# Patient Record
Sex: FEMALE | Race: BLACK OR AFRICAN AMERICAN | NOT HISPANIC OR LATINO | Employment: STUDENT | URBAN - METROPOLITAN AREA
[De-identification: names, ages, dates, MRNs, and addresses within clinical notes are randomized per-mention and may not be internally consistent; named-entity substitution may affect disease eponyms.]

---
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
## 2021-01-18 ENCOUNTER — OFFICE VISIT (OUTPATIENT)
Dept: FAMILY MEDICINE CLINIC | Facility: CLINIC | Age: 21
End: 2021-01-18

## 2021-01-18 VITALS
DIASTOLIC BLOOD PRESSURE: 74 MMHG | RESPIRATION RATE: 18 BRPM | HEART RATE: 89 BPM | SYSTOLIC BLOOD PRESSURE: 122 MMHG | TEMPERATURE: 97.8 F | HEIGHT: 67 IN | BODY MASS INDEX: 45.04 KG/M2 | WEIGHT: 287 LBS | OXYGEN SATURATION: 99 %

## 2021-01-18 DIAGNOSIS — E66.9 OBESITY (BMI 35.0-39.9 WITHOUT COMORBIDITY): ICD-10-CM

## 2021-01-18 DIAGNOSIS — Z00.00 ANNUAL PHYSICAL EXAM: Primary | ICD-10-CM

## 2021-01-18 DIAGNOSIS — Z78.9 NEED FOR FOLLOW-UP BY SOCIAL WORKER: ICD-10-CM

## 2021-01-18 DIAGNOSIS — R53.83 OTHER FATIGUE: ICD-10-CM

## 2021-01-18 PROCEDURE — 99395 PREV VISIT EST AGE 18-39: CPT | Performed by: FAMILY MEDICINE

## 2021-01-18 NOTE — ASSESSMENT & PLAN NOTE
Counseled on safe sex practices, currently abstinent  UTD with immunization  Declined flu shot  Lipid panel, A1c pending

## 2021-01-18 NOTE — PROGRESS NOTES
Assessment/Plan:    Annual physical exam  Counseled on safe sex practices, currently abstinent  UTD with immunization  Declined flu shot  Lipid panel, A1c pending    Obesity (BMI 35 0-39 9 without comorbidity)  Counseled patient on the importance of working to achieve weight reduction goal   Discussed benefits of weight loss including prevention or control of comorbidities   Discussed role that balanced diet and daily activity play in weight reduction   Set up small attainable weight loss goals   Involve family, friends, and co-workers for support  Other fatigue  PHQ2-0  TSH CBC pending  No sleep apnea symptoms       Diagnoses and all orders for this visit:    Annual physical exam    Obesity (BMI 35 0-39 9 without comorbidity)    Other fatigue    Need for follow-up by   -     Ambulatory referral to social work care management program; Future          Subjective:      Patient ID: Maxwell Epley is a 24 y o  female  HPI  This is a very pleasant 24 y o  female who presents to the clinic for management of their chronic medical conditions  Patient's medical conditions are stable unless noted otherwise above  Patient has not had any recent hospitalizations, or medical emergencies since last visit  Patient has no further complaints other than what is mentioned in the ROS  Denies any depressive symptoms, has withdrawn from school for semester secondary to pending unfortunately has not found job to date  Report expected waking likely due to sedentary lifestyle, reports increased fatigue sleeping approximately 10-12 hours per day and taking naps throughout  Denies any suicidal homicidal ideations  The following portions of the patient's history were reviewed and updated as appropriate: allergies, current medications, past family history, past medical history, past social history, past surgical history and problem list     Review of Systems   Constitutional: Positive for fatigue   Negative for activity change, appetite change, fever and unexpected weight change  HENT: Negative for sneezing, sore throat and tinnitus  Eyes: Negative for pain  Respiratory: Negative for cough, choking and chest tightness  Cardiovascular: Negative for chest pain, palpitations and leg swelling  Gastrointestinal: Negative for abdominal distention, abdominal pain, constipation, diarrhea, nausea and vomiting  Endocrine: Negative for polydipsia, polyphagia and polyuria  Genitourinary: Negative for dysuria and flank pain  Musculoskeletal: Negative for back pain  Neurological: Negative for dizziness, seizures, syncope, speech difficulty, light-headedness and numbness  Psychiatric/Behavioral: Negative for agitation  Objective:      /74 (BP Location: Left arm, Patient Position: Sitting, Cuff Size: Large)   Pulse 89   Temp 97 8 °F (36 6 °C) (Temporal)   Resp 18   Ht 5' 7" (1 702 m)   Wt 130 kg (287 lb)   LMP 01/18/2021 (Exact Date)   SpO2 99%   BMI 44 95 kg/m²          Physical Exam  Constitutional:       General: She is not in acute distress  Appearance: She is well-developed  She is obese  She is not diaphoretic  HENT:      Head: Normocephalic and atraumatic  Right Ear: External ear normal  There is no impacted cerumen  Left Ear: External ear normal  There is no impacted cerumen  Ears:      Comments: R ear effusion  BC>AC right     Mouth/Throat:      Mouth: Mucous membranes are moist       Pharynx: No oropharyngeal exudate  Eyes:      General:         Right eye: No discharge  Left eye: No discharge  Pupils: Pupils are equal, round, and reactive to light  Neck:      Musculoskeletal: Neck supple  Thyroid: No thyromegaly  Trachea: No tracheal deviation  Cardiovascular:      Rate and Rhythm: Normal rate and regular rhythm  Heart sounds: Normal heart sounds  No murmur     Pulmonary:      Effort: Pulmonary effort is normal  No respiratory distress  Breath sounds: Normal breath sounds  No wheezing  Abdominal:      General: Bowel sounds are normal  There is no distension  Palpations: Abdomen is soft  Tenderness: There is no abdominal tenderness  There is no guarding  Musculoskeletal: Normal range of motion  Skin:     General: Skin is warm  Findings: No rash  Neurological:      Mental Status: She is alert and oriented to person, place, and time

## 2021-02-12 ENCOUNTER — PATIENT OUTREACH (OUTPATIENT)
Dept: FAMILY MEDICINE CLINIC | Facility: CLINIC | Age: 21
End: 2021-02-12

## 2021-02-12 NOTE — LETTER
[facility] [facility]  [address]  [phone]    Re:    2/26/2021       Dear Carlyn Scales,    We tried to reach you by phone on three occassions to discuss Mental Health Referral options in the area and was unfortunately unable to reach you  It is important that you contact the [place] [address] as soon as possible at: [phone]       Sincerely,         Shell Maldonado MSW, LSW

## 2021-02-26 NOTE — PROGRESS NOTES
JOSE ANGEL FORBES attemptted to contact Pt on three occassions to discuss [address] referrals in the community however there was no response  JOSE ANGEL FORBES left vms and sent letter to Pt address for further follow up  JOSE ANGEL FORBES will remain available for further assistance as needed

## 2021-12-06 ENCOUNTER — OFFICE VISIT (OUTPATIENT)
Dept: FAMILY MEDICINE CLINIC | Facility: CLINIC | Age: 21
End: 2021-12-06

## 2021-12-06 DIAGNOSIS — J30.1 SEASONAL ALLERGIC RHINITIS DUE TO POLLEN: ICD-10-CM

## 2021-12-06 DIAGNOSIS — J02.9 PHARYNGITIS, UNSPECIFIED ETIOLOGY: Primary | ICD-10-CM

## 2021-12-06 PROCEDURE — 99214 OFFICE O/P EST MOD 30 MIN: CPT | Performed by: NURSE PRACTITIONER

## 2021-12-06 RX ORDER — LORATADINE 10 MG/1
10 TABLET ORAL DAILY
Qty: 30 TABLET | Refills: 3 | Status: SHIPPED | OUTPATIENT
Start: 2021-12-06

## 2021-12-06 RX ORDER — SENNOSIDES 8.6 MG
650 CAPSULE ORAL EVERY 8 HOURS PRN
Qty: 30 TABLET | Refills: 0 | Status: SHIPPED | OUTPATIENT
Start: 2021-12-06

## 2021-12-06 RX ORDER — FLUTICASONE PROPIONATE 50 MCG
1 SPRAY, SUSPENSION (ML) NASAL DAILY
Qty: 18 G | Refills: 2 | Status: SHIPPED | OUTPATIENT
Start: 2021-12-06

## 2021-12-07 PROCEDURE — U0005 INFEC AGEN DETEC AMPLI PROBE: HCPCS | Performed by: NURSE PRACTITIONER

## 2021-12-07 PROCEDURE — U0003 INFECTIOUS AGENT DETECTION BY NUCLEIC ACID (DNA OR RNA); SEVERE ACUTE RESPIRATORY SYNDROME CORONAVIRUS 2 (SARS-COV-2) (CORONAVIRUS DISEASE [COVID-19]), AMPLIFIED PROBE TECHNIQUE, MAKING USE OF HIGH THROUGHPUT TECHNOLOGIES AS DESCRIBED BY CMS-2020-01-R: HCPCS | Performed by: NURSE PRACTITIONER

## 2021-12-08 LAB — SARS-COV-2 RNA RESP QL NAA+PROBE: NEGATIVE

## 2021-12-09 ENCOUNTER — OFFICE VISIT (OUTPATIENT)
Dept: URGENT CARE | Age: 21
End: 2021-12-09
Payer: COMMERCIAL

## 2021-12-09 VITALS
HEART RATE: 90 BPM | OXYGEN SATURATION: 100 % | TEMPERATURE: 99 F | BODY MASS INDEX: 45.04 KG/M2 | RESPIRATION RATE: 18 BRPM | HEIGHT: 67 IN | WEIGHT: 287 LBS

## 2021-12-09 DIAGNOSIS — J06.9 VIRAL URI WITH COUGH: Primary | ICD-10-CM

## 2021-12-09 DIAGNOSIS — Z11.59 SPECIAL SCREENING EXAMINATION FOR VIRAL DISEASE: ICD-10-CM

## 2021-12-09 LAB
FLUAV RNA RESP QL NAA+PROBE: NEGATIVE
FLUBV RNA RESP QL NAA+PROBE: NEGATIVE
RSV RNA RESP QL NAA+PROBE: NEGATIVE
SARS-COV-2 RNA RESP QL NAA+PROBE: NEGATIVE

## 2021-12-09 PROCEDURE — 0241U HB NFCT DS VIR RESP RNA 4 TRGT: CPT | Performed by: FAMILY MEDICINE

## 2021-12-09 PROCEDURE — G0382 LEV 3 HOSP TYPE B ED VISIT: HCPCS | Performed by: FAMILY MEDICINE

## 2021-12-09 RX ORDER — BROMPHENIRAMINE MALEATE, PSEUDOEPHEDRINE HYDROCHLORIDE, AND DEXTROMETHORPHAN HYDROBROMIDE 2; 30; 10 MG/5ML; MG/5ML; MG/5ML
10 SYRUP ORAL 3 TIMES DAILY PRN
Qty: 200 ML | Refills: 0 | Status: SHIPPED | OUTPATIENT
Start: 2021-12-09

## 2022-11-17 ENCOUNTER — OFFICE VISIT (OUTPATIENT)
Dept: FAMILY MEDICINE CLINIC | Facility: CLINIC | Age: 22
End: 2022-11-17

## 2022-11-17 VITALS
WEIGHT: 232 LBS | BODY MASS INDEX: 36.41 KG/M2 | SYSTOLIC BLOOD PRESSURE: 118 MMHG | RESPIRATION RATE: 16 BRPM | HEART RATE: 105 BPM | TEMPERATURE: 98.7 F | DIASTOLIC BLOOD PRESSURE: 80 MMHG | HEIGHT: 67 IN | OXYGEN SATURATION: 99 %

## 2022-11-17 DIAGNOSIS — J02.9 ACUTE PHARYNGITIS, UNSPECIFIED ETIOLOGY: Primary | ICD-10-CM

## 2022-11-17 LAB — S PYO AG THROAT QL: NEGATIVE

## 2022-11-17 RX ORDER — AMOXICILLIN 500 MG/1
500 TABLET, FILM COATED ORAL 2 TIMES DAILY
Qty: 20 TABLET | Refills: 0 | Status: SHIPPED | OUTPATIENT
Start: 2022-11-17 | End: 2022-11-27

## 2022-11-17 RX ORDER — DEXAMETHASONE 2 MG/1
2 TABLET ORAL ONCE
Qty: 1 TABLET | Refills: 0 | Status: SHIPPED | OUTPATIENT
Start: 2022-11-17 | End: 2022-11-17

## 2022-11-17 NOTE — LETTER
November 17, 2022     Patient: Kacy Lindquist  YOB: 2000  Date of Visit: 11/17/2022      To Whom it May Concern:    Fransisca Julio César is under my professional care  Fransisca was seen in my office on 11/17/2022  Fransisca may return to work on 11/21/22  If you have any questions or concerns, please don't hesitate to call           Sincerely,          Dotty Reagan PA-C        CC: No Recipients

## 2022-11-17 NOTE — PROGRESS NOTES
Assessment/Plan:    Acute pharyngitis  - POCT rapid strep in office is negative  Will send for throat culture  - Due to severity of symptoms and presence of exudates, will treat with amoxicillin 500 mg, twice daily for 10 days  Advised to take entire course of antibiotics even if feeling better beforehand     - Will prescribe Decadron 2 mg, 1 does for throat swelling   - Will prescribe throat lozenges, to be taken every 2 hours as needed for sore throat  - Advised to perform salt water gargles  - Advised to contact the office or report to ED if symptoms persist, worsen, or new symptoms arise  Diagnoses and all orders for this visit:    Acute pharyngitis, unspecified etiology  -     amoxicillin (AMOXIL) 500 MG tablet; Take 1 tablet (500 mg total) by mouth 2 (two) times a day for 10 days  -     Benzocaine-Menthol 6-10 MG lozenge; Take 1 lozenge by mouth every 2 (two) hours as needed for sore throat  -     dexamethasone (DECADRON) 2 mg tablet; Take 1 tablet (2 mg total) by mouth 1 (one) time for 1 dose  -     Throat culture  -     POCT rapid strepA        All of patients questions were answered  Patient understands and agrees with the above plan  Return if symptoms worsen or fail to improve  Kiya Arora PA-C  11/17/22  [address] [place]          Subjective:     Patient ID: Afshin Weinberg  is a 25 y o  female with known PMH of   Seasonal allergies who presents today in office for  Same-day visit for sore throat  - Patient is a 25 y o  female who presents today for  Same-day visit for sore throat  Associated symptoms include nasal blockage, sinus and nasal congestion, sore throat, white spots in throat and Sinus pressure, headaches, slight difficulty with swallowing  Onset of symptoms was 3 days ago, and have been gradually worsening since that time  She is drinking moderate amounts of fluids  She has not had recent close exposure to someone with proven streptococcal pharyngitis   Patient has been drinking tea  And taking Tylenol and Mucinex with little relief  The following portions of the patient's history were reviewed and updated as appropriate: allergies, current medications, past family history, past medical history, past social history, past surgical history and problem list         Review of Systems   Constitutional: Negative for chills and fever  HENT: Positive for congestion, rhinorrhea, sore throat and trouble swallowing  Negative for ear pain  Eyes: Negative for pain  Respiratory: Positive for cough  Negative for chest tightness and shortness of breath  Gastrointestinal: Negative for abdominal pain, constipation, diarrhea, nausea and vomiting  Genitourinary: Negative for difficulty urinating and dysuria  Musculoskeletal: Negative for arthralgias  Skin: Negative for rash  Neurological: Positive for headaches  Negative for dizziness  Objective:   Vitals:    11/17/22 1323   BP: 118/80   BP Location: Right arm   Patient Position: Sitting   Cuff Size: Large   Pulse: 105   Resp: 16   Temp: 98 7 °F (37 1 °C)   TempSrc: Temporal   SpO2: 99%   Weight: 105 kg (232 lb)   Height: 5' 7" (1 702 m)         Physical Exam  Vitals and nursing note reviewed  Constitutional:       General: She is not in acute distress  Appearance: She is well-developed  HENT:      Head: Normocephalic and atraumatic  Right Ear: Tympanic membrane and external ear normal       Left Ear: Tympanic membrane and external ear normal       Nose: Congestion present  Mouth/Throat:      Mouth: Mucous membranes are moist       Pharynx: Pharyngeal swelling, oropharyngeal exudate and posterior oropharyngeal erythema present  Tonsils: Tonsillar exudate present  Eyes:      Conjunctiva/sclera: Conjunctivae normal    Cardiovascular:      Rate and Rhythm: Normal rate and regular rhythm  Pulses: Normal pulses  Heart sounds: Normal heart sounds     Pulmonary:      Effort: Pulmonary effort is normal  No respiratory distress  Breath sounds: Normal breath sounds  No wheezing  Musculoskeletal:      Cervical back: Normal range of motion and neck supple  Skin:     General: Skin is warm and dry  Neurological:      Mental Status: She is alert and oriented to person, place, and time     Psychiatric:         Behavior: Behavior normal

## 2022-11-17 NOTE — LETTER
November 17, 2022     Patient: Miguel Griffith  YOB: 2000  Date of Visit: 11/17/2022      To Whom it May Concern:    Fransisca Julio César is under my professional care  Fransisca was seen in my office on 11/17/2022  Fransisca may return to work on 11/18/22  If you have any questions or concerns, please don't hesitate to call           Sincerely,          Dotty Reagan PA-C        CC: No Recipients

## 2022-11-19 LAB — BACTERIA THROAT CULT: NORMAL

## 2022-12-03 ENCOUNTER — OFFICE VISIT (OUTPATIENT)
Dept: FAMILY MEDICINE CLINIC | Facility: CLINIC | Age: 22
End: 2022-12-03

## 2022-12-03 VITALS
HEIGHT: 67 IN | RESPIRATION RATE: 18 BRPM | HEART RATE: 76 BPM | WEIGHT: 230 LBS | SYSTOLIC BLOOD PRESSURE: 106 MMHG | BODY MASS INDEX: 36.1 KG/M2 | DIASTOLIC BLOOD PRESSURE: 76 MMHG | OXYGEN SATURATION: 99 % | TEMPERATURE: 96.4 F

## 2022-12-03 DIAGNOSIS — Z11.59 ENCOUNTER FOR HEPATITIS C SCREENING TEST FOR LOW RISK PATIENT: ICD-10-CM

## 2022-12-03 DIAGNOSIS — Z28.21 COVID-19 VACCINATION DECLINED: ICD-10-CM

## 2022-12-03 DIAGNOSIS — Z28.21 INFLUENZA VACCINATION DECLINED: ICD-10-CM

## 2022-12-03 DIAGNOSIS — Z23 ENCOUNTER FOR IMMUNIZATION: ICD-10-CM

## 2022-12-03 DIAGNOSIS — J30.1 SEASONAL ALLERGIC RHINITIS DUE TO POLLEN: ICD-10-CM

## 2022-12-03 DIAGNOSIS — G89.29 CHRONIC MIDLINE LOW BACK PAIN WITHOUT SCIATICA: ICD-10-CM

## 2022-12-03 DIAGNOSIS — M54.6 CHRONIC BILATERAL THORACIC BACK PAIN: ICD-10-CM

## 2022-12-03 DIAGNOSIS — Z11.4 SCREENING FOR HIV (HUMAN IMMUNODEFICIENCY VIRUS): ICD-10-CM

## 2022-12-03 DIAGNOSIS — M54.50 CHRONIC MIDLINE LOW BACK PAIN WITHOUT SCIATICA: ICD-10-CM

## 2022-12-03 DIAGNOSIS — G89.29 CHRONIC BILATERAL THORACIC BACK PAIN: ICD-10-CM

## 2022-12-03 DIAGNOSIS — F41.9 ANXIETY: ICD-10-CM

## 2022-12-03 DIAGNOSIS — Z11.3 ROUTINE SCREENING FOR STI (SEXUALLY TRANSMITTED INFECTION): ICD-10-CM

## 2022-12-03 DIAGNOSIS — E66.9 OBESITY (BMI 35.0-39.9 WITHOUT COMORBIDITY): ICD-10-CM

## 2022-12-03 DIAGNOSIS — Z00.00 ANNUAL PHYSICAL EXAM: Primary | ICD-10-CM

## 2022-12-03 DIAGNOSIS — N62 MACROMASTIA: ICD-10-CM

## 2022-12-03 DIAGNOSIS — M25.561 CHRONIC PAIN OF RIGHT KNEE: ICD-10-CM

## 2022-12-03 DIAGNOSIS — G89.29 CHRONIC PAIN OF RIGHT KNEE: ICD-10-CM

## 2022-12-03 PROBLEM — R53.83 OTHER FATIGUE: Status: RESOLVED | Noted: 2021-01-18 | Resolved: 2022-12-03

## 2022-12-03 NOTE — PATIENT INSTRUCTIONS

## 2022-12-03 NOTE — PROGRESS NOTES
[address] [facility]ACT[facility]ESA    NAME: Fransisca Angela  AGE: 25 y o   SEX: female  : 2000     DATE: 12/3/2022     Assessment and Plan:     Problem List Items Addressed This Visit        Respiratory    Seasonal allergic rhinitis due to pollen       Other    Obesity (BMI 35 0-39 9 without comorbidity)     -Encouraged diet and lifestyle changes: decrease processed foods (cakes, cookies, chips, soda), decrease total carbohydrate intake, decrease fried/fatty foods, increase fruits and vegetables, increase lean proteins (chicken, turkey), increase healthy fats (avocado, fish, nuts), drink plenty of water (at least four 16 oz bottles per day)           Relevant Orders    Ambulatory Referral to Weight Management    CBC and differential    Lipid panel    Basic metabolic panel    Annual physical exam - Primary    Anxiety     Due to family issues, currently stable  She is agreeable to therapy  Referral to psych and discussed coping mechanisms/ identified mother as support person          Relevant Orders    Ambulatory Referral to Psychiatry    Chronic pain of right knee     For several months, no injury or trauma  TTP MCL, no decreased ROM or instability   Will check xray, trial topical NSAID, referral to PT          Relevant Medications    Diclofenac Sodium (VOLTAREN) 1 %    Other Relevant Orders    XR knee 3 vw right non injury    Ambulatory Referral to Physical Therapy    Chronic midline low back pain without sciatica     2/2 poor body mechanics   Referral to PT          Chronic bilateral thoracic back pain     2/2 macromastia   Referral to PT   Referral to weight management          Macromastia   Other Visit Diagnoses     Encounter for immunization        Screening for HIV (human immunodeficiency virus)        Relevant Orders    HIV 1/2 Antigen/Antibody (4th Generation) w Reflex [facility]    Encounter for hepatitis C screening test for low risk patient        Relevant Orders    Hepatitis C antibody    Routine screening for STI (sexually transmitted infection)        COVID-19 vaccination declined        Influenza vaccination declined              Immunizations and preventive care screenings were discussed with patient today  Appropriate education was printed on patient's after visit summary  Counseling:  Alcohol/drug use: discussed moderation in alcohol intake, the recommendations for healthy alcohol use, and avoidance of illicit drug use  Dental Health: discussed importance of regular tooth brushing, flossing, and dental visits  Injury prevention: discussed safety/seat belts, safety helmets, smoke detectors, carbon dioxide detectors, and smoking near bedding or upholstery  Sexual health: discussed sexually transmitted diseases, partner selection, use of condoms, avoidance of unintended pregnancy, and contraceptive alternatives  · Exercise: the importance of regular exercise/physical activity was discussed  Recommend exercise 3-5 times per week for at least 30 minutes  BMI Counseling: Body mass index is 36 02 kg/m²  The BMI is above normal  Nutrition recommendations include decreasing portion sizes, encouraging healthy choices of fruits and vegetables, decreasing fast food intake, consuming healthier snacks and limiting drinks that contain sugar  Exercise recommendations include exercising 3-5 times per week  Patient referred to weight management  Rationale for BMI follow-up plan is due to patient being overweight or obese  Depression Screening and Follow-up Plan: Patient was screened for depression during today's encounter  They screened negative with a PHQ-2 score of 0        PHQ-2/9 Depression Screening    Little interest or pleasure in doing things: 0 - not at all  Feeling down, depressed, or hopeless: 0 - not at all  PHQ-2 Score: 0  PHQ-2 Interpretation: Negative depression screen         Return in 3 months (on 3/3/2023) for knee pain, back pain   Chief Complaint:     Chief Complaint   Patient presents with   • Physical Exam      History of Present Illness:     Adult Annual Physical   Patient here for a comprehensive physical exam      Diet and Physical Activity  · Diet/Nutrition: well balanced diet  · Exercise: walking  Depression Screening  PHQ-2/9 Depression Screening    Little interest or pleasure in doing things: 0 - not at all  Feeling down, depressed, or hopeless: 0 - not at all  PHQ-2 Score: 0  PHQ-2 Interpretation: Negative depression screen       Immunization History   Administered Date(s) Administered   • DTaP 2001, 2001, 2001, 06/07/2002, 03/09/2005   • HPV Quadrivalent 04/08/2015   • HPV9 04/08/2015, 09/25/2019   • Hep A, ped/adol, 2 dose 06/18/2008, 01/02/2009   • Hep B / HiB 2001, 2001   • Hep B, Adolescent or Pediatric 2001, 2001, 2001   • Hepatitis A 06/18/2008, 01/02/2009   • HiB 2001, 06/07/2002   • INFLUENZA 12/01/2016   • IPV 2001, 2001, 03/09/2005   • MMR 03/14/2002, 03/09/2005   • Meningococcal Conjugate (MCV4O) 07/19/2012, 07/17/2018   • Meningococcal MCV4P 07/19/2012   • Pneumococcal Conjugate PCV 7 2001, 2001, 2001, 07/02/2004   • Tdap 07/19/2012   • Varicella 03/14/2002, 06/13/2007         General Health  · Sleep: sleeps well  · Hearing: normal - bilateral   · Vision: goes for regular eye exams and wears glasses  · Dental: no dental visits for >1 year  /GYN Health  · Last menstrual period: 11/16/2022  · Contraceptive method: abstinence  · History of STDs?: no      Review of Systems:     Review of Systems   Constitutional: Negative for activity change, appetite change, chills, fatigue, fever and unexpected weight change  HENT: Negative for hearing loss, nosebleeds, sinus pain, sneezing, sore throat and trouble swallowing  Eyes: Negative for photophobia and visual disturbance     Respiratory: Negative for cough, chest tightness, shortness of breath and wheezing  Cardiovascular: Negative for chest pain, palpitations and leg swelling  Gastrointestinal: Negative for abdominal pain, constipation, nausea and vomiting  Genitourinary: Negative for decreased urine volume, difficulty urinating, dysuria, flank pain, genital sores, hematuria and urgency  Musculoskeletal: Positive for arthralgias, back pain and myalgias  Negative for gait problem  Skin: Negative for pallor, rash and wound  Neurological: Negative for dizziness, seizures, syncope, weakness, numbness and headaches  Hematological: Negative for adenopathy  Does not bruise/bleed easily  Psychiatric/Behavioral: Negative for confusion, hallucinations, self-injury, sleep disturbance and suicidal ideas  The patient is not nervous/anxious  Past Medical History:     No past medical history on file  Past Surgical History:     No past surgical history on file  Social History:     Social History     Socioeconomic History   • Marital status: Single     Spouse name: None   • Number of children: None   • Years of education: None   • Highest education level: None   Occupational History   • None   Tobacco Use   • Smoking status: Never   • Smokeless tobacco: Never   Substance and Sexual Activity   • Alcohol use: None   • Drug use: None   • Sexual activity: None   Other Topics Concern   • None   Social History Narrative    [employer]     Social Determinants of Health     Financial Resource Strain: Low Risk    • Difficulty of Paying Living Expenses: Not hard at all   Food Insecurity: No Food Insecurity   • Worried About Running Out of Food in the Last Year: Never true   • Ran Out of Food in the Last Year: Never true   Transportation Needs: No Transportation Needs   • Lack of Transportation (Medical): No   • Lack of Transportation (Non-Medical):  No   Physical Activity: Not on file   Stress: Not on file   Social Connections: Not on file   Intimate Partner Violence: Not on file   Housing Stability: Not on file      Family History:     Family History   Problem Relation Age of Onset   • Diabetes Father       Current Medications:     Current Outpatient Medications   Medication Sig Dispense Refill   • Diclofenac Sodium (VOLTAREN) 1 % Apply 2 g topically 4 (four) times a day 100 g 2   • acetaminophen (TYLENOL) 650 mg CR tablet Take 1 tablet (650 mg total) by mouth every 8 (eight) hours as needed for mild pain 30 tablet 0   • Benzocaine-Menthol 6-10 MG lozenge Take 1 lozenge by mouth every 2 (two) hours as needed for sore throat 100 tablet 0   • brompheniramine-pseudoephedrine-DM 30-2-10 MG/5ML syrup Take 10 mL by mouth 3 (three) times a day as needed for cough or congestion 200 mL 0   • dextromethorphan-guaifenesin (MUCINEX DM)  MG per 12 hr tablet Take 1 tablet by mouth every 12 (twelve) hours 30 tablet 0   • fluticasone (FLONASE) 50 mcg/act nasal spray 1 spray into each nostril daily 18 g 2   • loratadine (CLARITIN) 10 mg tablet Take 1 tablet (10 mg total) by mouth daily 30 tablet 3     No current facility-administered medications for this visit  Allergies: Allergies   Allergen Reactions   • No Active Allergies       Physical Exam:     /76 (BP Location: Right arm, Patient Position: Sitting, Cuff Size: Standard)   Pulse 76   Temp (!) 96 4 °F (35 8 °C) (Temporal)   Resp 18   Ht 5' 7" (1 702 m)   Wt 104 kg (230 lb)   LMP 11/16/2022 (Exact Date)   SpO2 99%   BMI 36 02 kg/m²     Physical Exam  Vitals and nursing note reviewed  Constitutional:       General: She is not in acute distress  Appearance: She is well-developed  She is obese  HENT:      Head: Normocephalic and atraumatic  Right Ear: Tympanic membrane and external ear normal       Left Ear: Tympanic membrane and external ear normal       Mouth/Throat:      Mouth: Mucous membranes are moist    Eyes:      Extraocular Movements: Extraocular movements intact        Conjunctiva/sclera: Conjunctivae normal       Pupils: Pupils are equal, round, and reactive to light  Cardiovascular:      Rate and Rhythm: Normal rate and regular rhythm  Pulmonary:      Effort: Pulmonary effort is normal  No respiratory distress  Breath sounds: Normal breath sounds  No wheezing  Abdominal:      Palpations: Abdomen is soft  Tenderness: There is no abdominal tenderness  Musculoskeletal:         General: No swelling  Normal range of motion  Cervical back: Neck supple  Thoracic back: Tenderness present  Lumbar back: Tenderness present  Negative right straight leg raise test and negative left straight leg raise test       Right knee: No bony tenderness  Normal range of motion  Tenderness present over the MCL  No medial joint line, lateral joint line, LCL, ACL, PCL or patellar tendon tenderness  No LCL laxity, MCL laxity, ACL laxity or PCL laxity  Instability Tests: Anterior drawer test negative  Posterior drawer test negative  Anterior Lachman test negative  Medial Kleber test negative and lateral Kleber test negative  Lymphadenopathy:      Cervical: No cervical adenopathy  Skin:     General: Skin is warm and dry  Capillary Refill: Capillary refill takes less than 2 seconds  Neurological:      General: No focal deficit present  Mental Status: She is alert and oriented to person, place, and time     Psychiatric:         Mood and Affect: Mood normal          Behavior: Behavior normal           Merian Pitch, [facility] 34

## 2022-12-03 NOTE — ASSESSMENT & PLAN NOTE
Due to family issues, currently stable  She is agreeable to therapy  Referral to psych and discussed coping mechanisms/ identified mother as support person

## 2022-12-03 NOTE — ASSESSMENT & PLAN NOTE
For several months, no injury or trauma  TTP MCL, no decreased ROM or instability   Will check xray, trial topical NSAID, referral to PT

## 2022-12-05 ENCOUNTER — TELEPHONE (OUTPATIENT)
Dept: PSYCHIATRY | Facility: CLINIC | Age: 22
End: 2022-12-05

## 2022-12-05 NOTE — TELEPHONE ENCOUNTER
Called Fransisca Angela  regarding routine  Lvm advising to return call to Intake Dept at [phone] to be placed on appropriate wait list

## 2022-12-08 ENCOUNTER — HOSPITAL ENCOUNTER (OUTPATIENT)
Dept: RADIOLOGY | Facility: HOSPITAL | Age: 22
End: 2022-12-08

## 2022-12-08 ENCOUNTER — APPOINTMENT (OUTPATIENT)
Dept: LAB | Facility: HOSPITAL | Age: 22
End: 2022-12-08

## 2022-12-08 DIAGNOSIS — Z11.4 SCREENING FOR HIV (HUMAN IMMUNODEFICIENCY VIRUS): ICD-10-CM

## 2022-12-08 DIAGNOSIS — Z11.59 ENCOUNTER FOR HEPATITIS C SCREENING TEST FOR LOW RISK PATIENT: ICD-10-CM

## 2022-12-08 DIAGNOSIS — E66.9 OBESITY (BMI 35.0-39.9 WITHOUT COMORBIDITY): ICD-10-CM

## 2022-12-08 DIAGNOSIS — G89.29 CHRONIC PAIN OF RIGHT KNEE: ICD-10-CM

## 2022-12-08 DIAGNOSIS — M25.561 CHRONIC PAIN OF RIGHT KNEE: ICD-10-CM

## 2022-12-08 LAB
ANION GAP SERPL CALCULATED.3IONS-SCNC: 6 MMOL/L (ref 5–14)
BASOPHILS # BLD AUTO: 0.01 THOUSANDS/ÂΜL (ref 0–0.1)
BASOPHILS NFR BLD AUTO: 0 % (ref 0–1)
BUN SERPL-MCNC: 12 MG/DL (ref 5–25)
CALCIUM SERPL-MCNC: 9.6 MG/DL (ref 8.4–10.2)
CHLORIDE SERPL-SCNC: 103 MMOL/L (ref 96–108)
CHOLEST SERPL-MCNC: 172 MG/DL
CO2 SERPL-SCNC: 29 MMOL/L (ref 21–32)
CREAT SERPL-MCNC: 0.73 MG/DL (ref 0.6–1.2)
EOSINOPHIL # BLD AUTO: 0.06 THOUSAND/ÂΜL (ref 0–0.61)
EOSINOPHIL NFR BLD AUTO: 1 % (ref 0–6)
ERYTHROCYTE [DISTWIDTH] IN BLOOD BY AUTOMATED COUNT: 12.9 % (ref 11.6–15.1)
GFR SERPL CREATININE-BSD FRML MDRD: 118 ML/MIN/1.73SQ M
GLUCOSE P FAST SERPL-MCNC: 73 MG/DL (ref 70–99)
HCT VFR BLD AUTO: 42.7 % (ref 34.8–46.1)
HDLC SERPL-MCNC: 50 MG/DL
HGB BLD-MCNC: 12.9 G/DL (ref 11.5–15.4)
IMM GRANULOCYTES # BLD AUTO: 0.02 THOUSAND/UL (ref 0–0.2)
IMM GRANULOCYTES NFR BLD AUTO: 0 % (ref 0–2)
LDLC SERPL CALC-MCNC: 113 MG/DL
LYMPHOCYTES # BLD AUTO: 2.38 THOUSANDS/ÂΜL (ref 0.6–4.47)
LYMPHOCYTES NFR BLD AUTO: 41 % (ref 14–44)
MCH RBC QN AUTO: 26.7 PG (ref 26.8–34.3)
MCHC RBC AUTO-ENTMCNC: 30.2 G/DL (ref 31.4–37.4)
MCV RBC AUTO: 88 FL (ref 82–98)
MONOCYTES # BLD AUTO: 0.44 THOUSAND/ÂΜL (ref 0.17–1.22)
MONOCYTES NFR BLD AUTO: 8 % (ref 4–12)
NEUTROPHILS # BLD AUTO: 2.89 THOUSANDS/ÂΜL (ref 1.85–7.62)
NEUTS SEG NFR BLD AUTO: 50 % (ref 43–75)
NONHDLC SERPL-MCNC: 122 MG/DL
NRBC BLD AUTO-RTO: 0 /100 WBCS
PLATELET # BLD AUTO: 261 THOUSANDS/UL (ref 149–390)
PMV BLD AUTO: 10.1 FL (ref 8.9–12.7)
POTASSIUM SERPL-SCNC: 3.9 MMOL/L (ref 3.5–5.3)
RBC # BLD AUTO: 4.83 MILLION/UL (ref 3.81–5.12)
SODIUM SERPL-SCNC: 138 MMOL/L (ref 135–147)
TRIGL SERPL-MCNC: 47 MG/DL
WBC # BLD AUTO: 5.8 THOUSAND/UL (ref 4.31–10.16)

## 2022-12-09 LAB
HCV AB SER QL: NORMAL
HIV 1+2 AB+HIV1 P24 AG SERPL QL IA: NORMAL

## 2022-12-09 NOTE — TELEPHONE ENCOUNTER
Called Fransisca Angela  regarding routine  Lvm advising to return call to Intake Dept at [phone] to be placed on appropriate wait list

## 2022-12-16 NOTE — TELEPHONE ENCOUNTER
Was calling pt in regards to routine referral and adding to proper wait list  LVM for pt to contact intake dept  Third attempt to reach pt   Referral closed

## 2023-03-03 ENCOUNTER — OFFICE VISIT (OUTPATIENT)
Dept: FAMILY MEDICINE CLINIC | Facility: CLINIC | Age: 23
End: 2023-03-03

## 2023-03-03 VITALS
BODY MASS INDEX: 35.79 KG/M2 | TEMPERATURE: 97.5 F | SYSTOLIC BLOOD PRESSURE: 120 MMHG | DIASTOLIC BLOOD PRESSURE: 74 MMHG | HEIGHT: 67 IN | HEART RATE: 61 BPM | WEIGHT: 228 LBS | RESPIRATION RATE: 16 BRPM | OXYGEN SATURATION: 99 %

## 2023-03-03 DIAGNOSIS — M54.50 CHRONIC MIDLINE LOW BACK PAIN WITHOUT SCIATICA: ICD-10-CM

## 2023-03-03 DIAGNOSIS — G89.29 CHRONIC MIDLINE LOW BACK PAIN WITHOUT SCIATICA: ICD-10-CM

## 2023-03-03 DIAGNOSIS — M54.6 CHRONIC BILATERAL THORACIC BACK PAIN: ICD-10-CM

## 2023-03-03 DIAGNOSIS — G89.29 CHRONIC PAIN OF RIGHT KNEE: Primary | ICD-10-CM

## 2023-03-03 DIAGNOSIS — J32.0 CHRONIC MAXILLARY SINUSITIS: ICD-10-CM

## 2023-03-03 DIAGNOSIS — G89.29 CHRONIC BILATERAL THORACIC BACK PAIN: ICD-10-CM

## 2023-03-03 DIAGNOSIS — M25.561 CHRONIC PAIN OF RIGHT KNEE: Primary | ICD-10-CM

## 2023-03-03 RX ORDER — AZELASTINE 1 MG/ML
1 SPRAY, METERED NASAL 2 TIMES DAILY
Qty: 30 ML | Refills: 1 | Status: SHIPPED | OUTPATIENT
Start: 2023-03-03

## 2023-03-03 RX ORDER — IBUPROFEN 600 MG/1
600 TABLET ORAL EVERY 6 HOURS PRN
Qty: 30 TABLET | Refills: 0 | Status: SHIPPED | OUTPATIENT
Start: 2023-03-03

## 2023-03-03 RX ORDER — FEXOFENADINE HCL 180 MG/1
180 TABLET ORAL DAILY
Qty: 90 TABLET | Refills: 1 | Status: SHIPPED | OUTPATIENT
Start: 2023-03-03

## 2023-03-03 NOTE — PROGRESS NOTES
Name: Swati White      : 2000      MRN: [id number]  Encounter Provider: CYRUS Olsen  Encounter Date: 3/3/2023   Encounter department: [address] [place] [address]     1  Chronic pain of right knee  Assessment & Plan:  Xray of the knee 2022 was normal   Recommend she schedule with PT   She did not find the topical NSAID effective- will d/c and she can take ibuprofen 600 mg every 6 hours PRN with food     Orders:  -     ibuprofen (MOTRIN) 600 mg tablet; Take 1 tablet (600 mg total) by mouth every 6 (six) hours as needed for mild pain    2  Chronic midline low back pain without sciatica  -     ibuprofen (MOTRIN) 600 mg tablet; Take 1 tablet (600 mg total) by mouth every 6 (six) hours as needed for mild pain    3  Chronic bilateral thoracic back pain  -     ibuprofen (MOTRIN) 600 mg tablet; Take 1 tablet (600 mg total) by mouth every 6 (six) hours as needed for mild pain    4  Chronic maxillary sinusitis  -     azelastine (ASTELIN) 0 1 % nasal spray; 1 spray into each nostril 2 (two) times a day Use in each nostril as directed  -     fexofenadine (ALLEGRA) 180 MG tablet; Take 1 tablet (180 mg total) by mouth daily  -     [facility] Allergy Panel, Adult; Future  -     Food Allergy Profile; Future  -     sodium chloride (OCEAN) 0 65 % nasal spray; 1 spray into each nostril as needed for congestion    BMI Counseling: Body mass index is 35 71 kg/m²  The BMI is above normal  Nutrition recommendations include consuming healthier snacks and limiting drinks that contain sugar  Exercise recommendations include exercising 3-5 times per week  Rationale for BMI follow-up plan is due to patient being overweight or obese  Subjective     24 YO female with PMH of chronic right knee pain, chronic mid and low back pain presents today for follow up  Reports that pain has not improved  She has not been able to schedule with PT yet   She is using the [facility] and Claritin but still having a lot of sinus congestion  Review of Systems   Constitutional: Negative for chills and fever  HENT: Positive for congestion and postnasal drip  Negative for ear pain and sore throat  Eyes: Negative for pain and visual disturbance  Respiratory: Negative for cough and shortness of breath  Cardiovascular: Negative for chest pain and palpitations  Gastrointestinal: Negative for abdominal pain and vomiting  Genitourinary: Negative for dysuria and hematuria  Musculoskeletal: Positive for arthralgias and back pain  Skin: Negative for color change and rash  Neurological: Negative for seizures and syncope  All other systems reviewed and are negative  No past medical history on file  No past surgical history on file  Family History   Problem Relation Age of Onset   • Diabetes Father      Social History     Socioeconomic History   • Marital status: Single     Spouse name: None   • Number of children: None   • Years of education: None   • Highest education level: None   Occupational History   • None   Tobacco Use   • Smoking status: Never   • Smokeless tobacco: Never   Substance and Sexual Activity   • Alcohol use: None   • Drug use: None   • Sexual activity: None   Other Topics Concern   • None   Social History Narrative    [employer]     Social Determinants of Health     Financial Resource Strain: Low Risk    • Difficulty of Paying Living Expenses: Not hard at all   Food Insecurity: No Food Insecurity   • Worried About Running Out of Food in the Last Year: Never true   • Ran Out of Food in the Last Year: Never true   Transportation Needs: No Transportation Needs   • Lack of Transportation (Medical): No   • Lack of Transportation (Non-Medical):  No   Physical Activity: Not on file   Stress: Not on file   Social Connections: Not on file   Intimate Partner Violence: Not on file   Housing Stability: Not on file     Current Outpatient Medications on File Prior to Visit Medication Sig   • acetaminophen (TYLENOL) 650 mg CR tablet Take 1 tablet (650 mg total) by mouth every 8 (eight) hours as needed for mild pain   • Benzocaine-Menthol 6-10 MG lozenge Take 1 lozenge by mouth every 2 (two) hours as needed for sore throat   • brompheniramine-pseudoephedrine-DM 30-2-10 MG/5ML syrup Take 10 mL by mouth 3 (three) times a day as needed for cough or congestion   • [DISCONTINUED] dextromethorphan-guaifenesin (MUCINEX DM)  MG per 12 hr tablet Take 1 tablet by mouth every 12 (twelve) hours   • [DISCONTINUED] Diclofenac Sodium (VOLTAREN) 1 % Apply 2 g topically 4 (four) times a day   • [DISCONTINUED] fluticasone (FLONASE) 50 mcg/act nasal spray 1 spray into each nostril daily   • [DISCONTINUED] loratadine (CLARITIN) 10 mg tablet Take 1 tablet (10 mg total) by mouth daily     No Known Allergies  Immunization History   Administered Date(s) Administered   • DTaP 2001, 2001, 2001, 06/07/2002, 03/09/2005   • HPV Quadrivalent 04/08/2015   • HPV9 04/08/2015, 09/25/2019   • Hep A, ped/adol, 2 dose 06/18/2008, 01/02/2009   • Hep B / HiB 2001, 2001   • Hep B, Adolescent or Pediatric 2001, 2001, 2001   • Hepatitis A 06/18/2008, 01/02/2009   • HiB 2001, 06/07/2002   • INFLUENZA 12/01/2016   • IPV 2001, 2001, 03/09/2005   • MMR 03/14/2002, 03/09/2005   • Meningococcal Conjugate (MCV4O) 07/19/2012, 07/17/2018   • Meningococcal MCV4P 07/19/2012   • Pneumococcal Conjugate PCV 7 2001, 2001, 2001, 07/02/2004   • Tdap 07/19/2012   • Varicella 03/14/2002, 06/13/2007       Objective     /74 (BP Location: Left arm, Patient Position: Sitting, Cuff Size: Large)   Pulse 61   Temp 97 5 °F (36 4 °C) (Temporal)   Resp 16   Ht 5' 7" (1 702 m)   Wt 103 kg (228 lb)   LMP 02/21/2023 (Exact Date)   SpO2 99%   BMI 35 71 kg/m²     Physical Exam  Vitals and nursing note reviewed     Constitutional:       General: She is not in acute distress  Appearance: She is well-developed  She is not diaphoretic  HENT:      Head: Normocephalic and atraumatic  Nose: Congestion present  Right Turbinates: Swollen  Left Turbinates: Swollen  Right Sinus: Maxillary sinus tenderness present  Left Sinus: Maxillary sinus tenderness present  Eyes:      Conjunctiva/sclera: Conjunctivae normal       Pupils: Pupils are equal, round, and reactive to light  Cardiovascular:      Rate and Rhythm: Normal rate and regular rhythm  Pulmonary:      Effort: Pulmonary effort is normal  No respiratory distress  Breath sounds: Normal breath sounds  No wheezing  Abdominal:      General: Bowel sounds are normal  There is no distension  Palpations: Abdomen is soft  Tenderness: There is no abdominal tenderness  Musculoskeletal:         General: No deformity  Cervical back: Normal range of motion and neck supple  Lymphadenopathy:      Cervical: No cervical adenopathy  Skin:     General: Skin is warm and dry  Capillary Refill: Capillary refill takes less than 2 seconds  Findings: No rash  Neurological:      Mental Status: She is alert and oriented to person, place, and time     Psychiatric:         Behavior: Behavior normal        [facility]

## 2023-03-03 NOTE — ASSESSMENT & PLAN NOTE
Xray of the knee 12/2022 was normal   Recommend she schedule with PT   She did not find the topical NSAID effective- will d/c and she can take ibuprofen 600 mg every 6 hours PRN with food

## 2023-03-21 ENCOUNTER — HOSPITAL ENCOUNTER (EMERGENCY)
Facility: HOSPITAL | Age: 23
Discharge: HOME | End: 2023-03-21
Attending: INTERNAL MEDICINE

## 2023-03-21 ENCOUNTER — APPOINTMENT (EMERGENCY)
Dept: RADIOLOGY | Facility: HOSPITAL | Age: 23
End: 2023-03-21

## 2023-03-21 VITALS
TEMPERATURE: 97.8 F | HEART RATE: 77 BPM | OXYGEN SATURATION: 100 % | RESPIRATION RATE: 20 BRPM | SYSTOLIC BLOOD PRESSURE: 125 MMHG | WEIGHT: 230 LBS | BODY MASS INDEX: 36.02 KG/M2 | DIASTOLIC BLOOD PRESSURE: 68 MMHG

## 2023-03-21 DIAGNOSIS — K21.9 GERD (GASTROESOPHAGEAL REFLUX DISEASE): Primary | ICD-10-CM

## 2023-03-21 LAB
ATRIAL RATE: 73 BPM
P AXIS: 14 DEGREES
PR INTERVAL: 158 MS
QRS AXIS: 18 DEGREES
QRSD INTERVAL: 86 MS
QT INTERVAL: 390 MS
QTC INTERVAL: 429 MS
T WAVE AXIS: 19 DEGREES
VENTRICULAR RATE: 73 BPM

## 2023-03-21 RX ORDER — LIDOCAINE HYDROCHLORIDE 20 MG/ML
15 SOLUTION OROPHARYNGEAL ONCE
Status: COMPLETED | OUTPATIENT
Start: 2023-03-21 | End: 2023-03-21

## 2023-03-21 RX ORDER — MAGNESIUM HYDROXIDE/ALUMINUM HYDROXICE/SIMETHICONE 120; 1200; 1200 MG/30ML; MG/30ML; MG/30ML
30 SUSPENSION ORAL ONCE
Status: COMPLETED | OUTPATIENT
Start: 2023-03-21 | End: 2023-03-21

## 2023-03-21 RX ORDER — FAMOTIDINE 40 MG/1
20 TABLET, FILM COATED ORAL 2 TIMES DAILY
Qty: 20 TABLET | Refills: 0 | Status: SHIPPED | OUTPATIENT
Start: 2023-03-21

## 2023-03-21 RX ORDER — FAMOTIDINE 20 MG/1
20 TABLET, FILM COATED ORAL ONCE
Status: COMPLETED | OUTPATIENT
Start: 2023-03-21 | End: 2023-03-21

## 2023-03-21 RX ADMIN — LIDOCAINE HYDROCHLORIDE 15 ML: 20 SOLUTION ORAL; TOPICAL at 13:34

## 2023-03-21 RX ADMIN — FAMOTIDINE 20 MG: 20 TABLET ORAL at 13:33

## 2023-03-21 RX ADMIN — ALUMINUM HYDROXIDE, MAGNESIUM HYDROXIDE, AND SIMETHICONE 30 ML: 200; 200; 20 SUSPENSION ORAL at 13:34

## 2023-03-21 NOTE — ED PROVIDER NOTES
History  Chief Complaint   Patient presents with   • Chest Pain     Sore throat and chest pain since yesterday  States it feels like a dry burning feeling     50-year-old female with no reported past medical history presenting to emergency department for burning chest pain that starts in epigastric region and goes up to her throat  History provided by:  Patient  Chest Pain  Pain location:  Substernal area  Pain quality: burning    Pain radiates to:  Epigastrium (Throat)  Pain radiates to the back: no    Duration:  1 day  Chronicity:  New  Context: eating    Relieved by:  Nothing  Worsened by:  Nothing tried  Ineffective treatments:  None tried  Associated symptoms: cough and heartburn    Associated symptoms: no abdominal pain, no anorexia, no anxiety, no back pain, no diaphoresis, no dizziness, no dysphagia, no fatigue, no fever, no headache, no nausea, no numbness, no palpitations, no shortness of breath, no syncope, not vomiting and no weakness        Prior to Admission Medications   Prescriptions Last Dose Informant Patient Reported? Taking?    Benzocaine-Menthol 6-10 MG lozenge   No No   Sig: Take 1 lozenge by mouth every 2 (two) hours as needed for sore throat   acetaminophen (TYLENOL) 650 mg CR tablet   No No   Sig: Take 1 tablet (650 mg total) by mouth every 8 (eight) hours as needed for mild pain   azelastine (ASTELIN) 0 1 % nasal spray   No No   Si spray into each nostril 2 (two) times a day Use in each nostril as directed   brompheniramine-pseudoephedrine-DM 30-2-10 MG/5ML syrup   No No   Sig: Take 10 mL by mouth 3 (three) times a day as needed for cough or congestion   fexofenadine (ALLEGRA) 180 MG tablet   No No   Sig: Take 1 tablet (180 mg total) by mouth daily   ibuprofen (MOTRIN) 600 mg tablet   No No   Sig: Take 1 tablet (600 mg total) by mouth every 6 (six) hours as needed for mild pain   sodium chloride (OCEAN) 0 65 % nasal spray   No No   Si spray into each nostril as needed for congestion      Facility-Administered Medications: None       History reviewed  No pertinent past medical history  History reviewed  No pertinent surgical history  Family History   Problem Relation Age of Onset   • Diabetes Father      I have reviewed and agree with the history as documented  E-Cigarette/Vaping   • E-Cigarette Use Never User      E-Cigarette/Vaping Substances     Social History     Tobacco Use   • Smoking status: Never     Passive exposure: Never   • Smokeless tobacco: Never   Vaping Use   • Vaping Use: Never used   Substance Use Topics   • Alcohol use: Never   • Drug use: Never       Review of Systems   Constitutional: Negative for appetite change, diaphoresis, fatigue and fever  HENT: Positive for sore throat  Negative for congestion, ear pain, rhinorrhea, trouble swallowing and voice change  Respiratory: Positive for cough  Negative for shortness of breath  Cardiovascular: Positive for chest pain  Negative for palpitations and syncope  Gastrointestinal: Positive for heartburn  Negative for abdominal distention, abdominal pain, anorexia, blood in stool, nausea and vomiting  Genitourinary: Negative for frequency and urgency  Musculoskeletal: Negative for back pain and neck pain  Skin: Negative for color change and rash  Neurological: Negative for dizziness, syncope, weakness, numbness and headaches  All other systems reviewed and are negative  Physical Exam  Physical Exam  Vitals and nursing note reviewed  Constitutional:       General: She is not in acute distress  Appearance: She is well-developed  She is not ill-appearing, toxic-appearing or diaphoretic  HENT:      Head: Normocephalic and atraumatic  Jaw: No trismus  Nose: Nose normal       Mouth/Throat:      Lips: Pink  Mouth: Mucous membranes are moist       Pharynx: Oropharynx is clear  Uvula midline  Posterior oropharyngeal erythema present   No pharyngeal swelling, oropharyngeal exudate or uvula swelling  Tonsils: No tonsillar exudate or tonsillar abscesses  Eyes:      Conjunctiva/sclera: Conjunctivae normal       Pupils: Pupils are equal, round, and reactive to light  Cardiovascular:      Rate and Rhythm: Normal rate and regular rhythm  Pulses:           Radial pulses are 2+ on the right side and 2+ on the left side  Heart sounds: Normal heart sounds  No murmur heard  No friction rub  Pulmonary:      Effort: Pulmonary effort is normal       Breath sounds: Normal breath sounds  Comments: Patient in no respiratory distress, speaking in full sentences, managing oral secretions without difficulty, no accessory muscle use, retractions, or belly breathing noted, no adventitious lung sounds auscultated bilaterally  Chest:      Chest wall: No tenderness, crepitus or edema  Abdominal:      General: There is no distension  Palpations: Abdomen is soft  Tenderness: There is no abdominal tenderness  Musculoskeletal:      Right lower leg: No edema  Left lower leg: No edema  Skin:     General: Skin is warm and dry  Capillary Refill: Capillary refill takes less than 2 seconds  Findings: No erythema or rash  Neurological:      Mental Status: She is alert        Gait: Gait normal          Vital Signs  ED Triage Vitals [03/21/23 1306]   Temperature Pulse Respirations Blood Pressure SpO2   97 8 °F (36 6 °C) 78 16 128/79 100 %      Temp Source Heart Rate Source Patient Position - Orthostatic VS BP Location FiO2 (%)   Tympanic Monitor Sitting Left arm --      Pain Score       --           Vitals:    03/21/23 1306 03/21/23 1334   BP: 128/79 125/68   Pulse: 78 77   Patient Position - Orthostatic VS: Sitting Sitting         Visual Acuity      ED Medications  Medications   aluminum-magnesium hydroxide-simethicone (MYLANTA) oral suspension 30 mL (30 mL Oral Given 3/21/23 1334)   Lidocaine Viscous HCl (XYLOCAINE) 2 % mucosal solution 15 mL (15 mL Swish & Swallow Given 3/21/23 1334)   famotidine (PEPCID) tablet 20 mg (20 mg Oral Given 3/21/23 1333)       Diagnostic Studies  Results Reviewed     None                 XR chest 2 views   ED Interpretation by Berny Sandhu PA-C (03/21 1423)   No PTX, no focal consolidation, no effusions  Final Result by Brandon Horn MD (03/22 7191)      No acute cardiopulmonary disease  Workstation performed: [id number]                    Procedures  Procedures         ED Course  ED Course as of 03/23/23 1732   Tue Mar 21, 2023   1306 Procedure Note: EKG  Date/Time: 03/21/23 1:06 PM   Performed by: Amber Cassidy   Authorized by: Amber Cassidy  ECG interpreted by me, the ED Provider: yes   The EKG demonstrates:  Rate 73 bpm  Rhythm NSR with sinus arrhythmia   QTc 429 ms   No ST elevations/depressions     1328 Cough, burning    1423 Imaging review done by myself and preliminary results were discussed with the patient and family members  Discussion was had with patient and family members that this read is preliminary and therefore discrepancies between this read and the final read by the radiologist are possible  Patient and family members were informed that any discrepancies found by would be relayed by phone call  SBIRT 20yo+    Flowsheet Row Most Recent Value   SBIRT (23 yo +)    In order to provide better care to our patients, we are screening all of our patients for alcohol and drug use  Would it be okay to ask you these screening questions? No Filed at: 03/21/2023 1323                    Medical Decision Making  Differential DX includes but is not limited to: acs/mi, pe, pleurisy, pneumonia, musculoskeletal chest pain, GERD,  PUD  No cardiac risk factors  Burning pain from epigastrium through chest to throat  Worse with eating, lying flat  She also reports dry cough  No hematemesis, melena  Vital signs are stable  She is afebrile  No acute respiratory distress  Benign abdominal exam   Erythematous throat no exudates or swelling, do not suspect RPA/PTA, Centor score of 0, will not test for strep at this time  PERC negative, low likelihood of PE, additionally test not indicated at this time  ECG without acute ischemic changes or dysrhythmia  Chest x-ray without acute cardiopulmonary disease on my wet read  Symptoms resolved with Mylanta, viscous lidocaine, famotidine  Plan for continued h2 blocker, GI follow-up  All imaging and/or lab testing discussed with patient, strict return to ED precautions discussed  Patient recommended to follow up promptly with appropriate outpatient provider  Patient and/or family members verbalizes understanding and agrees with plan  Patient and/or family members were given opportunity to ask questions, all questions were answered at this time  Patient is stable for discharge      Portions of the record may have been created with voice recognition software  Occasional wrong word or "sound a like" substitutions may have occurred due to the inherent limitations of voice recognition software  Read the chart carefully and recognize, using context, where substitutions have occurred  Amount and/or Complexity of Data Reviewed  Radiology: ordered and independent interpretation performed  Risk  OTC drugs  Prescription drug management  Disposition  Final diagnoses:   GERD (gastroesophageal reflux disease)     Time reflects when diagnosis was documented in both MDM as applicable and the Disposition within this note     Time User Action Codes Description Comment    3/21/2023  1:41 PM Lasha Zamorano Add [K21 9] GERD (gastroesophageal reflux disease)       ED Disposition     ED Disposition   Discharge    Condition   Stable    Date/Time   Tue Mar 21, 2023  1:41 PM    Comment   Fransisca Angela discharge to home/self care                 Follow-up Information     Follow up With Specialties Details Why Contact Info Additional Information Maxi Cooney, [facility] [place] [facility], Nurse Practitioner Schedule an appointment as soon as possible for a visit  For follow up regarding your symptoms [address]  [address] [facility]  [address]  [address] [facility] Schedule an appointment as soon as possible for a visit  For follow up regarding your symptoms [address]  [address] [address]  Trinh Horowitz [address] [facility], [address], [address], [facility], [place], [address]   [phone]          Discharge Medication List as of 3/21/2023  2:23 PM      START taking these medications    Details   famotidine (PEPCID) 40 MG tablet Take 0 5 tablets (20 mg total) by mouth 2 (two) times a day, Starting Tue 3/21/2023, Normal         CONTINUE these medications which have NOT CHANGED    Details   acetaminophen (TYLENOL) 650 mg CR tablet Take 1 tablet (650 mg total) by mouth every 8 (eight) hours as needed for mild pain, Starting Mon 12/6/2021, Normal      azelastine (ASTELIN) 0 1 % nasal spray 1 spray into each nostril 2 (two) times a day Use in each nostril as directed, Starting Fri 3/3/2023, Normal      Benzocaine-Menthol 6-10 MG lozenge Take 1 lozenge by mouth every 2 (two) hours as needed for sore throat, Starting Thu 11/17/2022, Normal      brompheniramine-pseudoephedrine-DM 30-2-10 MG/5ML syrup Take 10 mL by mouth 3 (three) times a day as needed for cough or congestion, Starting Thu 12/9/2021, Normal      fexofenadine (ALLEGRA) 180 MG tablet Take 1 tablet (180 mg total) by mouth daily, Starting Fri 3/3/2023, Normal      ibuprofen (MOTRIN) 600 mg tablet Take 1 tablet (600 mg total) by mouth every 6 (six) hours as needed for mild pain, Starting Fri 3/3/2023, Normal      sodium chloride (OCEAN) 0 65 % nasal spray 1 spray into each nostril as needed for congestion, Starting Fri 3/3/2023, Normal             No discharge procedures on file      PDMP Review     None          ED Provider  Electronically Signed by           Ronaldo Cannon PA-C  03/23/23 1100

## 2023-03-21 NOTE — Clinical Note
Fransisca Angela was seen and treated in our emergency department on 3/21/2023  Diagnosis:     Fransisca    She may return on this date: 03/22/2023         If you have any questions or concerns, please don't hesitate to call        Rahat Adam PA-C    ______________________________           _______________          _______________  Hospital Representative                              Date                                Time

## 2023-04-03 NOTE — PROGRESS NOTES
"PT Evaluation     Today's date: 2023  Patient name: Cesar Porter  : 2000  MRN: [id number]  Referring provider: Veronica Perez, *  Dx:   Encounter Diagnosis     ICD-10-CM    1  Lateral meniscus derangement, right  M23 300       2  Chronic pain of right knee  M25 561 Ambulatory Referral to Physical Therapy    G89 29           Start Time: 0840  Stop Time: 0920  Total time in clinic (min): 40 minutes    Assessment  Assessment details: Cesar Porter is a 26 y o  female who presents to PT evaluation for chronic R knee pain lasting about 6-7 months  Findings of evaluation suggestive of R lateral mensical lesion  She demonstrates the following impairments: R knee pain, decreased ROM, poor muscle performance, poor activity tolerance, weight bearing intolerance, proximal hip weakness  The listed impairments limit the individuals ability to perform the following: lifting/bending/squatting, walking, work related duties in warehouse  HEP was provided and reviewed  Patient is able to complete HEP with good technique and appropriate pain response  Patient expressed understanding of appropriate dosage and frequency of HEP  No additional referral necessary  Pt would benefit from skilled PT to improve upon impairments to improve QoL  Problem List:   1  R LE CKC muscle performance  2  Proximal hip weakness  3  R knee flexion  Impairments: abnormal or restricted ROM, activity intolerance, impaired balance, impaired physical strength, lacks appropriate home exercise program, pain with function and weight-bearing intolerance  Understanding of Dx/Px/POC: excellent  Goals  Short Term Goals: In 2-4 weeks, the patient will:  1  15 deg improvement in R knee AROM flexion  2  0 5 point improvement in R knee MMT  3  Supervision with HEP for self care    Long Term Goals: At time of D/C, the patient will:  1  <2/6 ecc step down 6\" R LE  2  FOTO to greater than predicted value  3   Independent with HEP for " selfcare      Plan  Patient would benefit from: skilled physical therapy  Planned modality interventions: biofeedback, cryotherapy, electrical stimulation/[place]izen [place] stimulation, manual electrical stimulation, microcurrent electrical stimulation, TENS and thermotherapy: hydrocollator packs  Planned therapy interventions: abdominal trunk stabilization, activity modification, ADL retraining, balance/weight bearing training, flexibility, functional ROM exercises, gait training, graded activity, graded exercise, home exercise program, joint mobilization, manual therapy, neuromuscular re-education, patient education, postural training, strengthening, stretching, therapeutic activities and therapeutic exercise  Frequency: 2x week  Duration in visits: 12  Duration in weeks: 6  Treatment plan discussed with: patient        Subjective Evaluation    History of Present Illness  Mechanism of injury: Pt reports that to PT evaluation for R knee pain that began 6-7 months ago  She notes that she was had an instance when she squatted to pick something up where she had instant sharp pain in the back of the knee as well as a new onset of a lump on the outside of her knee  She notes that the lump took some time to appear and that there had been instances where it has came and gone  She notes that her knee pain gives her problems with stairs (going up) where she feels a clicking  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     Quality of life: excellent    Pain  Current pain ratin  At best pain ratin  At worst pain ratin  Quality: sharp  Aggravating factors: stair climbing, walking and lifting  Progression: improved      Diagnostic Tests  X-ray: normal  Treatments  Previous treatment: medication  Patient Goals  Patient goals for therapy: decreased pain, increased motion, independence with ADLs/IADLs, return to work and increased strength  Patient goal: to get knee better for working [facility]        Objective  1:1 with Claudell Helm, DPT for entirety of tx  OBJECTIVE:  Range of Motion: Goniometric revealed the following findings (in degrees)  Joint Motion Right: Left:    Knee Extension -5 -5   Knee Flexion 125 140     Strength: MMT revealed the following findings    Joint Motion Right:  Left:    Hip Flexion 4/5 4/5   Hip Extension 3+/5 3+/5   Hip Abduction 4/5 4/5   Knee Extension 4/5 5/5   Knee Flexion 5/5 5/5     Additional Assessments:  Palpation: TTP R - lateral joint line, LCL  Observation: unremarkable  Gait Pattern: unremarkable  Functional Assessment:   - OHS: pt unable to bear weight on R knee past 100 deg knee flex  - 6\" Ecc Step Down       -- L: 3/6      --R: 5/6  Joint Mobility: empty end feel R knee flexion     Special Tests:  Test (Structure evaluated) (+/-)   Lachman (ACL) N   Posterior Drawer (PCL) N   Sag (PCL) N   Valgus Stress (MCL) N   Varus Stress (LCL) P   Kleber (Menisci) P   Thessaly (Menisci) P   Apley Compression/Distraction P (tibial IR)          Precautions: obesity, anxiety     Pertinent Findings and Outcome Measures:                                                                                                                                                                         Test / Measure  04/03/23        FOTO (pred 74) 57      OHS pt unable to bear weight on R knee past 100 deg knee flex      6\" Ecc Step Down    L: 3/6     R: 5/6      R knee Flexion PROM R: -5-125  L: -5-140      R knee/ b/l proximal hip MMT 4/5          Manuals 4/4                                                                Neuro Re-Ed 4/4            Seated Heel Slide HEP            LAQ HEP            Table SLR: flex, ABD, Ext HEP            Bridge HEP            Mini Squats HEP                                      Ther Ex 4/4            Bike/Elliptical             Leg Press             Step Ups/Down  4\"           Japanese Squat             [place] Retro Walking             S/L " RDL                                       Ther Activity 4/4            KB Deadlift             Goblet Squat             Ecc Step Down                                                                 Gait Training 4/4                                      Modalities 4/4

## 2023-04-04 ENCOUNTER — EVALUATION (OUTPATIENT)
Dept: PHYSICAL THERAPY | Facility: CLINIC | Age: 23
End: 2023-04-04

## 2023-04-04 DIAGNOSIS — M25.561 CHRONIC PAIN OF RIGHT KNEE: ICD-10-CM

## 2023-04-04 DIAGNOSIS — M23.300 LATERAL MENISCUS DERANGEMENT, RIGHT: Primary | ICD-10-CM

## 2023-04-04 DIAGNOSIS — G89.29 CHRONIC PAIN OF RIGHT KNEE: ICD-10-CM

## 2023-04-07 ENCOUNTER — OFFICE VISIT (OUTPATIENT)
Dept: PHYSICAL THERAPY | Facility: CLINIC | Age: 23
End: 2023-04-07

## 2023-04-07 DIAGNOSIS — M23.300 LATERAL MENISCUS DERANGEMENT, RIGHT: ICD-10-CM

## 2023-04-07 DIAGNOSIS — M25.561 CHRONIC PAIN OF RIGHT KNEE: Primary | ICD-10-CM

## 2023-04-07 DIAGNOSIS — G89.29 CHRONIC PAIN OF RIGHT KNEE: Primary | ICD-10-CM

## 2023-04-07 NOTE — PROGRESS NOTES
"Daily Note     Today's date: 2023  Patient name: Rosette Coello  : 2000  MRN: [id number]  Referring provider: Bri Field, *  Dx:   Encounter Diagnosis     ICD-10-CM    1  Chronic pain of right knee  M25 561     G89 29       2  Lateral meniscus derangement, right  M23 300           Start Time: 0800  Stop Time: 0838  Total time in clinic (min): 38 minutes    Subjective: pt reports that her R knee was starting to feel a little better with exercises and at work  Objective: See treatment diary below      Assessment: Tolerated treatment well  Patient demonstrated fatigue post treatment, exhibited good technique with therapeutic exercises and would benefit from continued PT  Pt tolerated today's session well as per appropriate response to intervention  She demonstrate improved R knee irritability and good carryover of HEP, progressed intervention  Educated pt on DOMS with new activity and to modify activity that causes asterisk sign of R knee pain  She continues to demonstrate R knee pain and LE weakness affecting her performance at work; and she would benefit from continued, skilled PT to improve impairments to improve QoL  1:1 with Gabby Elise DPT for entirety of tx  Plan: Continue per plan of care        Precautions: obesity, anxiety     Pertinent Findings and Outcome Measures:                                                                                                                                                                         Test / Measure  23        FOTO (pred 74) 57      OHS pt unable to bear weight on R knee past 100 deg knee flex      6\" Ecc Step Down    L: 3/6     R: 5/6      R knee Flexion PROM R: -5-125  L: -5-140      R knee/ b/l proximal hip MMT 4/          Manuals                                                                Neuro Re-Ed            Seated Heel Slide HEP            LAQ HEP            Table SLR: flex, ABD, Ext HEP " "Flex/  ABD  2x10           Bridge HEP GTB ABD   5\"x20           Mini Squats HEP            Reverse Hyper  2x10 ea                        Ther Ex 4/4 4/7           Bike/Elliptical  Bike 8'           Leg Press  95#  3x15           Step Ups/Down  6\" 3x12           Turkish Squat  3x10           [place] Retro Walking             S/L RDL                                       Ther Activity 4/4 4/7           KB Deadlift             Goblet Squat             Ecc Step Down                                                                 Gait Training 4/4 4/7                                     Modalities 4/4 4/7                                          "

## 2023-04-14 ENCOUNTER — APPOINTMENT (OUTPATIENT)
Dept: PHYSICAL THERAPY | Facility: CLINIC | Age: 23
End: 2023-04-14

## 2023-04-14 NOTE — PROGRESS NOTES
"Daily Note     Today's date: 2023  Patient name: Bridget Angela  : 2000  MRN: [id number]  Referring provider: Je Story, *  Dx: No diagnosis found  Subjective: Pt presents to PT       Objective: See treatment diary below      Assessment: Tolerated treatment well  Patient demonstrated fatigue post treatment, exhibited good technique with therapeutic exercises and would benefit from continued PT      Plan: Continue per plan of care        Precautions: obesity, anxiety     Pertinent Findings and Outcome Measures:                                                                                                                                                                         Test / Measure  23        FOTO (pred 74) 57      OHS pt unable to bear weight on R knee past 100 deg knee flex      6\" Ecc Step Down    L: 3/     R: 5/6      R knee Flexion PROM R: -5-125  L: -5-140      R knee/ b/l proximal hip MMT           Manuals 4/4 4/7 4/10 4/14                                                             Neuro Re-Ed 4/4 4/7 4/10 4/14         Seated Heel Slide HEP            LAQ HEP            Table SLR: flex, ABD, Ext HEP Flex/  ABD  2x10 Flex/  ABD  3x10          Bridge HEP GTB ABD   5\"x20 GTB ABD   5\"x20          Mini Squats HEP            Reverse Hyper  2x10 ea 3x10 ea NV                      Ther Ex 4/4 4/7 4/10 4/14         Bike/Elliptical  Bike 8' Bike 8' NV         Leg Press  95#  3x15 95#  3x15 NV         Step Ups/Down  6\" 3x12 6\" 3x12 NV         Pashto Squat  3x10 3x10 NV         [place] Retro Walking             S/L RDL                                       Ther Activity 4/4 4/7 4/10 4/14         KB Deadlift    NV         Goblet Squat    NV         Ecc Step Down    NV                                                             Gait Training 4/4 4/7 4/10                                    Modalities 4/4 4/7 4/10                                         "

## 2023-04-24 ENCOUNTER — OFFICE VISIT (OUTPATIENT)
Dept: PHYSICAL THERAPY | Facility: CLINIC | Age: 23
End: 2023-04-24

## 2023-04-24 DIAGNOSIS — M25.561 CHRONIC PAIN OF RIGHT KNEE: Primary | ICD-10-CM

## 2023-04-24 DIAGNOSIS — G89.29 CHRONIC PAIN OF RIGHT KNEE: Primary | ICD-10-CM

## 2023-04-24 DIAGNOSIS — M23.300 LATERAL MENISCUS DERANGEMENT, RIGHT: ICD-10-CM

## 2023-04-24 NOTE — PROGRESS NOTES
"Daily Note     Today's date: 2023  Patient name: Sabrina Carbajal  : 2000  MRN: [id number]  Referring provider: Alma Valdovinos, *  Dx:   Encounter Diagnosis     ICD-10-CM    1  Chronic pain of right knee  M25 561     G89 29       2  Lateral meniscus derangement, right  M23 300           Start Time: 0800  Stop Time: 0845  Total time in clinic (min): 45 minutes    Subjective: pt reports that her knee is feeling better entering today's session, but that she was sore for most of the week after last week's session  She reports that her biggest issue at this point is some knee discomfort on the outside of her knee when she turns/twists with a cart  Objective: See treatment diary below      Assessment: Tolerated treatment well  Patient demonstrated fatigue post treatment, exhibited good technique with therapeutic exercises and would benefit from continued PT   Pt tolerated today's session well as per appropriate response to intervention  Decreased total volume of quadriceps loaded exercises to gradually improve activity and she demonstrated improved tolerance  Educated to monitor symptoms and DOMS  Added new intervention to address tibial anti-rotational and s/l CKCstability and she demonstrated fair performance but low irritability  1:1 with Ritchie Johnson DPT for entirety of tx  Plan: Continue per plan of care        Precautions: obesity, anxiety     Pertinent Findings and Outcome Measures:                                                                                                                                                                         Test / Measure  23     FOTO (pred 74) 57 85     OHS pt unable to bear weight on R knee past 100 deg knee flex      6\" Ecc Step Down    L: 3/6     R: 5/6      R knee Flexion PROM R: -5-125  L: -5-140      R knee/ b/l proximal hip MMT /          Manuals 4/4 4/7 4/10 4/17 4/24                                                        " "    Neuro Re-Ed 4/4 4/7 4/10 4/17 4/24        Seated Heel Slide HEP            LAQ HEP            Table SLR: flex, ABD, Ext HEP Flex/  ABD  2x10 Flex/  ABD  3x10          Bridge HEP GTB ABD   5\"x20 GTB ABD   5\"x20          Mini Squats HEP            Reverse Hyper  2x10 ea 3x10 ea          Offset L OH Squat    2x15 2x15        Sissy Squat     2x15         Paloff press     S/L3# 15x3\" ea        Crossover Step Up                                       Ther Ex 4/4 4/7 4/10 4/17 4/24        Bike/Elliptical  Bike 8' Bike 8' Bike 8' Bike 8'        Leg Press  95#  3x15 95#  3x15          Step Ups/Down  6\" 3x12 6\" 3x12 8\" 3x12         [place] Squat  3x10 3x10  3x10        Tina Retro Walking             S/L RDL                                       Ther Activity 4/4 4/7 4/10 4/17 4/24        KB Deadlift    26# 3x8 26# 3x8        Goblet Squat             Ecc Step Down    4\" 2x10 4\" 2x10        S/L RDL     2x8 c foam roll assist                                               Gait Training 4/4 4/7 4/10  4/24                                  Modalities 4/4 4/7 4/10  4/24                                       "

## 2023-04-26 ENCOUNTER — OFFICE VISIT (OUTPATIENT)
Dept: GASTROENTEROLOGY | Facility: CLINIC | Age: 23
End: 2023-04-26

## 2023-04-26 VITALS
DIASTOLIC BLOOD PRESSURE: 70 MMHG | HEIGHT: 67 IN | SYSTOLIC BLOOD PRESSURE: 118 MMHG | TEMPERATURE: 97.5 F | BODY MASS INDEX: 37.04 KG/M2 | WEIGHT: 236 LBS

## 2023-04-26 DIAGNOSIS — R13.19 OTHER DYSPHAGIA: Primary | ICD-10-CM

## 2023-04-26 DIAGNOSIS — K21.9 GASTROESOPHAGEAL REFLUX DISEASE WITHOUT ESOPHAGITIS: ICD-10-CM

## 2023-04-26 NOTE — PATIENT INSTRUCTIONS
Scheduled date of EGD(as of today): 06/06/2023  Physician performing EGD: Lotus  Location of EGD: [facility]  Instructions reviewed with patient by:  Kwame Gong  Clearances:   N/A

## 2023-04-26 NOTE — PROGRESS NOTES
[facility] - Outpatient Note  Fransisca Angela 26 y o  female MRN: [id number]  Encounter: [id number]      ASSESSMENT AND PLAN:    Sabrina Carbajal is a 26 y o  old female with PMH of obesity who presents for ED follow-up  GERD- Symptoms have resolved completely  Symptoms do sound typical of GERD  · We will evaluate with EGD for esophagitis  · Hold off on PPI until after EGD    Dysphagia -solid food dysphagia without warning symptoms  No previous endoscopy  Points to above the sternal notch which may indicate oropharyngeal dysphagia  No constitutional symptoms  · Plan for EGD to evaluate for dysphagia  · Check VBS and barium swallow        There are no diagnoses linked to this encounter   ______________________________________________________________________    SUBJECTIVE:  Fransisca Angela is a 26 y o  old female with PMH of obesity who presents for ED follow-up  Patient in the emergency department in March of this year complaining of burning chest pain that starts in the epigastric region and goes up to the substernal area  She reports after the ED follow-up she started Pepcid however got allergic reaction including swelling in her legs and rash so stopped medication  Symptoms persisted for approximately 2 to 3 weeks but have stopped completely  Were triggered by food especially greasy food  She does report solid food dysphagia occurring multiple times a week  No significant GERD now  Denies weight loss      REVIEW OF SYSTEMS IS OTHERWISE NEGATIVE  Historical Information   No past medical history on file  No past surgical history on file    Social History   Social History     Substance and Sexual Activity   Alcohol Use Never     Social History     Substance and Sexual Activity   Drug Use Never     Social History     Tobacco Use   Smoking Status Never   • Passive exposure: Never   Smokeless Tobacco Never     Family History   Problem Relation Age of Onset   • Diabetes Father        Meds/Allergies       Current Outpatient Medications:   •  acetaminophen (TYLENOL) 650 mg CR tablet  •  azelastine (ASTELIN) 0 1 % nasal spray  •  Benzocaine-Menthol 6-10 MG lozenge  •  brompheniramine-pseudoephedrine-DM 30-2-10 MG/5ML syrup  •  famotidine (PEPCID) 40 MG tablet  •  fexofenadine (ALLEGRA) 180 MG tablet  •  ibuprofen (MOTRIN) 600 mg tablet  •  sodium chloride (OCEAN) 0 65 % nasal spray    Allergies   Allergen Reactions   • Famotidine Rash           Objective     not currently breastfeeding  There is no height or weight on file to calculate BMI  PHYSICAL EXAM:      General Appearance:   Alert, cooperative, no distress   HEENT:   Normocephalic, atraumatic, anicteric  Neck:  Supple, symmetrical, trachea midline   Lungs:   Clear to auscultation bilaterally; no rales, rhonchi or wheezing; respirations unlabored    Heart[de-identified]   Regular rate and rhythm; no murmur, rub, or gallop  Abdomen:   Soft, non-tender, non-distended; normal bowel sounds; no masses, no organomegaly    Genitalia:   Deferred    Rectal:   Deferred    Extremities:  No cyanosis, clubbing or edema    Pulses:  2+ and symmetric    Skin:  No jaundice, rashes, or lesions    Lymph nodes:  No palpable cervical lymphadenopathy        Lab Results:   No visits with results within 1 Day(s) from this visit  Latest known visit with results is:   Admission on 03/21/2023, Discharged on 03/21/2023   Component Date Value   • Ventricular Rate 03/21/2023 73    • Atrial Rate 03/21/2023 73    • OR Interval 03/21/2023 158    • QRSD Interval 03/21/2023 86    • QT Interval 03/21/2023 390    • QTC Interval 03/21/2023 429    • P Axis 03/21/2023 14    • QRS Axis 03/21/2023 18    • T Wave Axis 03/21/2023 19          Radiology Results:   No results found

## 2023-04-28 ENCOUNTER — OFFICE VISIT (OUTPATIENT)
Dept: PHYSICAL THERAPY | Facility: CLINIC | Age: 23
End: 2023-04-28

## 2023-04-28 DIAGNOSIS — G89.29 CHRONIC PAIN OF RIGHT KNEE: Primary | ICD-10-CM

## 2023-04-28 DIAGNOSIS — M23.300 LATERAL MENISCUS DERANGEMENT, RIGHT: ICD-10-CM

## 2023-04-28 DIAGNOSIS — M25.561 CHRONIC PAIN OF RIGHT KNEE: Primary | ICD-10-CM

## 2023-04-28 NOTE — PROGRESS NOTES
"Discharge Note     Today's date: 2023  Patient name: Kim Wang  : 2000  MRN: [id number]  Referring provider: Live Beckham, *  Dx:   Encounter Diagnosis     ICD-10-CM    1  Chronic pain of right knee  M25 561     G89 29       2  Lateral meniscus derangement, right  M23 300           Start Time: 08  Stop Time: 08  Total time in clinic (min): 17 minutes    Subjective: pt reports that she is able to move her knee more, with less pain without any issues squatting since beginning a PT intervention  She denies any functional limitations due to her R knee  She reports that she is 85% improved since beginning PT intervention, and that she gets a sesnation of cracking/popping that is a little uncomfortable  Pt reports that she feels good from terminating PT intervention and doing the exercises at home at this point  Objective: See treatment diary below  Goals  Short Term Goals: In 2-4 weeks, the patient will:  1  15 deg improvement in R knee AROM flexion (MET)  2  0 5 point improvement in R knee MMT (MET)  3  Supervision with HEP for self care (MET)    Long Term Goals: At time of D/C, the patient will:  1  <2/6 ecc step down 6\" R LE (progressing 2/6 b/l)  2  FOTO to greater than predicted value (MET)  3  Independent with HEP for selfcare (MET)    Assessment: Tolerated treatment well  Patient demonstrated fatigue post treatment, exhibited good technique with therapeutic exercises and would benefit from continued PT  Pt 6 minutes late and accounted for  Pt has demonstrated improved R knee irritability, ROM, muscle performance, activity tolerance, and functional mobility since beginning PT intervention  She though she demonstrates some CKC weakness and faulty functional movement of tier 1 OHS; comparable to unaffected limb  Educated pt on meniscal lesion outcomes/prognosis and encouraged pt continue with updated HEP   PT recommends that pt D/C from PT POC    1:1 with Nick Cope DPT for " "entirety of tx  Plan: PT recommends that pt D/C from PT POC        Precautions: obesity, anxiety     Pertinent Findings and Outcome Measures:                                                                                                                                                                         Test / Measure  04/03/23 4/24 4/28    FOTO (pred 74) 57 85     OHS pt unable to bear weight on R knee past 100 deg knee flex  Full knee AROM, forward trunk lean, b/l heel raise    6\" Ecc Step Down    L: 3/6     R: 5/6  2/6 b/l    R knee Flexion PROM R: -5-125  L: -5-140  R: -5-140  L: -5-140    R knee/ b/l proximal hip MMT 4/5 5/5        Manuals 4/4 4/7 4/10 4/17 4/24 4/28                                                           Neuro Re-Ed 4/4 4/7 4/10 4/17 4/24 4/28       Seated Heel Slide HEP            LAQ HEP            Table SLR: flex, ABD, Ext HEP Flex/  ABD  2x10 Flex/  ABD  3x10          Bridge HEP GTB ABD   5\"x20 GTB ABD   5\"x20          Mini Squats HEP            Reverse Hyper  2x10 ea 3x10 ea          Offset L OH Squat    2x15 2x15        Sissy Squat     2x15         Paloff press     S/L3# 15x3\" ea        Crossover Step Up                          Pt education      BV       Ther Ex 4/4 4/7 4/10 4/17 4/24 4/28       Bike/Elliptical  Bike 8' Bike 8' Bike 8' Bike 8'        Leg Press  95#  3x15 95#  3x15          Step Ups/Down  6\" 3x12 6\" 3x12 8\" 3x12         Arabic Squat  3x10 3x10  3x10        Kevil Retro Walking             S/L RDL                                       Ther Activity 4/4 4/7 4/10 4/17 4/24 4/28       KB Deadlift    26# 3x8 26# 3x8        Goblet Squat             Ecc Step Down    4\" 2x10 4\" 2x10        S/L RDL     2x8 c foam roll assist                                               Gait Training 4/4 4/7 4/10  4/24                                  Modalities 4/4 4/7 4/10  4/24                                       "

## 2023-05-24 ENCOUNTER — TELEPHONE (OUTPATIENT)
Dept: GASTROENTEROLOGY | Facility: CLINIC | Age: 23
End: 2023-05-24

## 2023-05-24 NOTE — TELEPHONE ENCOUNTER
Rescheduled EGD to 6/16 with Dr Estrella Galvez @ [address] [place] [address] - pt agreeable

## 2023-06-13 ENCOUNTER — TELEPHONE (OUTPATIENT)
Dept: GASTROENTEROLOGY | Facility: CLINIC | Age: 23
End: 2023-06-13

## 2023-06-13 NOTE — TELEPHONE ENCOUNTER
Scheduled date of EGD(as of today): 07/31/2023  Physician performing EGD:Dr Gautam  Location of EGD:[facility]  Clearances: n/a

## 2023-07-20 ENCOUNTER — TELEPHONE (OUTPATIENT)
Dept: GASTROENTEROLOGY | Facility: CLINIC | Age: 23
End: 2023-07-20

## 2023-07-20 NOTE — TELEPHONE ENCOUNTER
Spoke to pt  Confirming Upcoming Procedure: EGD on 7/31  Physician performing: Dr. Asmita Alan  Location of procedure:  [facility]  Prep: EGD

## 2023-07-21 ENCOUNTER — HOSPITAL ENCOUNTER (OUTPATIENT)
Dept: RADIOLOGY | Facility: HOSPITAL | Age: 23
Discharge: HOME | End: 2023-07-21
Payer: COMMERCIAL

## 2023-07-21 DIAGNOSIS — R13.19 OTHER DYSPHAGIA: ICD-10-CM

## 2023-07-21 PROCEDURE — 92611 MOTION FLUOROSCOPY/SWALLOW: CPT

## 2023-07-21 PROCEDURE — 74230 X-RAY XM SWLNG FUNCJ C+: CPT

## 2023-07-21 NOTE — PROCEDURES
Speech Pathology - Modified Barium Swallow Study    Patient Name: Karissa Daniel Date: 7/21/2023     Problem List  Active Problems: There are no active Hospital Problems. Past Medical History  No past medical history on file. Past Surgical History  No past surgical history on file. Assessment Summary:    Pt presents with minimal oropharyngeal dysphagia characterized by piecemeal deglutition, premature spillage to the pyriforms, and delayed swallow initiation. Despite delayed swallow, no penetration or aspiration occurred across all consistencies and no significant pharyngeal retention was seen. Note: Images are available for review in PACS as desired. Recommendations:   Recommended Diet: regular diet and thin liquids   Recommended Form of Medications: whole with liquid   Aspiration precautions and compensatory swallowing strategies: upright posture, slow rate of feeding, small bites/sips and alternating bites and sips  Consider referral to: Pt has EGD scheduled. Continue f/u with GI. If no improvement consider additional workup per PCP recs. SLP Dysphagia therapy recommended: No    Results Reviewed with: patient       General Information;  Pt is a 26 y.o. female with recent development of dysphagia. Pt reports difficulty swallowing both solids and liquids, feeling like she "forgets how to swallow," also reporting feeling soreness in her throat when swallowing solids. MBS was recommended to assess oropharyngeal stage swallowing skills at this time. Pt was viewed sitting upright in the lateral and AP positions. Trials administered were consistent with MBSImP Validated Protocol: Pt was given 5-mL thin liquid x2, 20-mL cup sip thin, 40-mL sequential swallow thin, 5-mL nectar thick, 20-mL cup sip nectar thick, 40-mL sequential swallow nectar thick, 5-mL honey thick, 5-mL pudding, ½ cookie coated with 3-mL pudding, 5-mL nectar thick in the AP position and 5-mL pudding in the AP position.  Pt was also given thin liquids by straw, as well as a barium tablet with thin liquid. Initial view observations/comments: Clear view of the upper airway      8-Point Penetration-Aspiration Scale   Thin liquid 1 - Material does not enter the airway   Nectar thick liquid 1 - Material does not enter the airway   Honey thick liquid 1 - Material does not enter the airway   Puree (pudding) 1 - Material does not enter the airway   Solid 1 - Material does not enter the airway     Aspiration Response and Efficacy: No penetration or aspiration occurred on this test.    MBS IMP Rating    ORAL Impairment  Compinent 1--Lip Closure  Judged at any point during the swallow. 1 - Interlabial escape; no progression to anterior lip    Component 2--Tongue Control During Bolus Hold  Judged on held liquid boluses only and prior to productive tongue movement. 2 - Posterior escape of less than half of bolus    Component 3--Bolus Preparation/Mastication  Judged only during presentation of 1/2 shortbread cookie coated in pudding. 0 - Timely and efficient chewing and mashing    Component 4--Bolus Transport/Lingual Motion  Judged after first productive tongue movement for oral bolus transport. 0 - Brisk tongue motion    Component 5--Oral Residue  Judged after first swallow or after the last swallow of the sequential swallow task. 3 - Majority of bolus remaining   Location   A - Floor of Mouth and C - Tongue    Component 6--Initiation of Pharyngeal Swallow  Judged at first movement of the brisk superior-anterior hyoid trajectory. 3 - Bolus head in pyriforms      PHARYNGEAL Impairment  Component 7--Soft Palate Elevation  Judged during maximum displacement of soft palate. 0 - No bolus between the soft palate (SP)/pharyngeal wall (PW)    Component 8--Laryngeal Elevation  Judged when epiglottis is in its most horizontal position.   0 - Complete superior movement of thyroid cartilage with complete approximation of arytenoids to epiglottic petiole    Component 9--Anterior Hyoid Excursion  Judged at height of swallow/maximal anterior hyoid displacement. 0 - Complete anterior movement    Component 10--Epiglottic Movement  Judged at height of swallow/maximal anterior hyoid displacement. 0 - Complete inversion    Component 11--Laryngeal Vestibular Closure  Judged at height of swallow/maximal anterior hyoid displacement. 0 - Complete; no air/contrast in laryngeal vestibule    Component 12--Pharyngeal Stripping Wave  Judged during the full duration of the pharyngeal swallow. 0 - Present - complete    Component 13--Pharyngeal Contraction  Judged in AP view at rest and throughout maximum movement of structures. 0 - Complete    Component 14--Pharyngoesophageal Segment Opening  Judged during maximum distension of PES and throughout opening and closure. 0 - Complete distension and complete duration; no obstruction of flow    Component 15--Tongue Base (TB) Retraction  Judged during maximum retraction of the tongue base. 0 - No contrast between TB and posterior pharyngeal wall (PW)    Component 16--Pharyngeal Residue  Judged after first swallow or after the last swallow of the sequential swallow task.   0 - Complete pharyngeal clearance      ESOPHAGEAL Impairment  Component 17--Esophageal Clearance Upright Position  Judged in AP view during bolus transit through the oral cavity to the LES  0 - Complete clearance; esophageal coating

## 2023-07-27 ENCOUNTER — APPOINTMENT (OUTPATIENT)
Dept: LAB | Facility: HOSPITAL | Age: 23
End: 2023-07-27
Payer: COMMERCIAL

## 2023-07-27 ENCOUNTER — OFFICE VISIT (OUTPATIENT)
Dept: FAMILY MEDICINE CLINIC | Facility: CLINIC | Age: 23
End: 2023-07-27

## 2023-07-27 VITALS
BODY MASS INDEX: 38.39 KG/M2 | TEMPERATURE: 97.5 F | OXYGEN SATURATION: 99 % | HEIGHT: 67 IN | SYSTOLIC BLOOD PRESSURE: 104 MMHG | WEIGHT: 244.6 LBS | RESPIRATION RATE: 19 BRPM | DIASTOLIC BLOOD PRESSURE: 68 MMHG | HEART RATE: 81 BPM

## 2023-07-27 DIAGNOSIS — Z11.1 SCREENING-PULMONARY TB: ICD-10-CM

## 2023-07-27 DIAGNOSIS — M54.6 CHRONIC BILATERAL THORACIC BACK PAIN: ICD-10-CM

## 2023-07-27 DIAGNOSIS — J32.0 CHRONIC MAXILLARY SINUSITIS: ICD-10-CM

## 2023-07-27 DIAGNOSIS — Z11.1 SCREENING-PULMONARY TB: Primary | ICD-10-CM

## 2023-07-27 DIAGNOSIS — N62 MACROMASTIA: ICD-10-CM

## 2023-07-27 DIAGNOSIS — Z23 ENCOUNTER FOR IMMUNIZATION: ICD-10-CM

## 2023-07-27 DIAGNOSIS — G89.29 CHRONIC BILATERAL THORACIC BACK PAIN: ICD-10-CM

## 2023-07-27 PROCEDURE — 82785 ASSAY OF IGE: CPT

## 2023-07-27 PROCEDURE — 90471 IMMUNIZATION ADMIN: CPT | Performed by: NURSE PRACTITIONER

## 2023-07-27 PROCEDURE — 86008 ALLG SPEC IGE RECOMB EA: CPT

## 2023-07-27 PROCEDURE — 0124A PR IMM ADMN SARSCOV2 BIVALENT 30 MCG/0.3 ML BST: CPT | Performed by: NURSE PRACTITIONER

## 2023-07-27 PROCEDURE — 86003 ALLG SPEC IGE CRUDE XTRC EA: CPT

## 2023-07-27 PROCEDURE — 91312 PR SARSCOV2 VACCINE BIVALENT 30 MCG/0.3 ML IM USE: CPT | Performed by: NURSE PRACTITIONER

## 2023-07-27 PROCEDURE — 99214 OFFICE O/P EST MOD 30 MIN: CPT | Performed by: NURSE PRACTITIONER

## 2023-07-27 PROCEDURE — 86480 TB TEST CELL IMMUN MEASURE: CPT

## 2023-07-27 PROCEDURE — 90715 TDAP VACCINE 7 YRS/> IM: CPT | Performed by: NURSE PRACTITIONER

## 2023-07-27 PROCEDURE — 36415 COLL VENOUS BLD VENIPUNCTURE: CPT

## 2023-07-27 NOTE — ASSESSMENT & PLAN NOTE
Consider referral to plastics for breast reduction evaluation if patient fails conservative measures

## 2023-07-27 NOTE — PROGRESS NOTES
Name: Darci Alejandro      : 2000      MRN: [id number]  Encounter Provider: CYRUS Ambrocio  Encounter Date: 2023   Encounter department: [address] [facility]     1. Screening-pulmonary TB  -     Quantiferon TB Gold Plus; Future    2. Encounter for immunization  -     TDAP VACCINE GREATER THAN OR EQUAL TO 8YO IM  -     Age 15 y+: Travisfort vac bivalent patti-sucr    3. Chronic bilateral thoracic back pain  Assessment & Plan:   macromastia   Referral to PT       Orders:  -     Ambulatory Referral to Physical Therapy; Future    4. Macromastia  Assessment & Plan:  Consider referral to plastics for breast reduction evaluation if patient fails conservative measures            Subjective     27 YO female presents today for follow up. 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 Continues with upper back pain due to macromastia. Will be starting PT. Review of Systems   Constitutional: Negative for activity change, appetite change, chills, fatigue, fever and unexpected weight change. HENT: Negative for hearing loss, nosebleeds, sinus pain, sneezing, sore throat and trouble swallowing. Eyes: Negative for photophobia and visual disturbance. Respiratory: Negative for cough, chest tightness, shortness of breath and wheezing. Cardiovascular: Negative for chest pain, palpitations and leg swelling. Gastrointestinal: Negative for abdominal pain, constipation, nausea and vomiting. Genitourinary: Negative for decreased urine volume, difficulty urinating, dysuria, flank pain, genital sores, hematuria and urgency. Musculoskeletal: Positive for arthralgias, back pain and myalgias. Negative for gait problem. Skin: Negative for pallor, rash and wound. Neurological: Negative for dizziness, seizures, syncope, weakness, numbness and headaches. Hematological: Negative for adenopathy.  Does not bruise/bleed easily. Psychiatric/Behavioral: Negative for confusion, hallucinations, self-injury, sleep disturbance and suicidal ideas. The patient is not nervous/anxious. No past medical history on file. No past surgical history on file. Family History   Problem Relation Age of Onset   • Diabetes Father      Social History     Socioeconomic History   • Marital status: Single     Spouse name: None   • Number of children: None   • Years of education: None   • Highest education level: None   Occupational History   • None   Tobacco Use   • Smoking status: Never     Passive exposure: Never   • Smokeless tobacco: Never   Vaping Use   • Vaping Use: Never used   Substance and Sexual Activity   • Alcohol use: Never   • Drug use: Never   • Sexual activity: None   Other Topics Concern   • None   Social History Narrative    [employer]     Social Determinants of Health     Financial Resource Strain: Low Risk  (12/3/2022)    Overall Financial Resource Strain (CARDIA)    • Difficulty of Paying Living Expenses: Not hard at all   Food Insecurity: No Food Insecurity (12/3/2022)    Hunger Vital Sign    • Worried About Running Out of Food in the Last Year: Never true    • Ran Out of Food in the Last Year: Never true   Transportation Needs: No Transportation Needs (12/3/2022)    PRAPARE - Transportation    • Lack of Transportation (Medical): No    • Lack of Transportation (Non-Medical):  No   Physical Activity: Not on file   Stress: Not on file   Social Connections: Not on file   Intimate Partner Violence: Not on file   Housing Stability: Not on file     Current Outpatient Medications on File Prior to Visit   Medication Sig   • acetaminophen (TYLENOL) 650 mg CR tablet Take 1 tablet (650 mg total) by mouth every 8 (eight) hours as needed for mild pain   • azelastine (ASTELIN) 0.1 % nasal spray 1 spray into each nostril 2 (two) times a day Use in each nostril as directed   • Benzocaine-Menthol 6-10 MG lozenge Take 1 lozenge by mouth every 2 (two) hours as needed for sore throat   • brompheniramine-pseudoephedrine-DM 30-2-10 MG/5ML syrup Take 10 mL by mouth 3 (three) times a day as needed for cough or congestion   • famotidine (PEPCID) 40 MG tablet Take 0.5 tablets (20 mg total) by mouth 2 (two) times a day   • fexofenadine (ALLEGRA) 180 MG tablet Take 1 tablet (180 mg total) by mouth daily   • ibuprofen (MOTRIN) 600 mg tablet Take 1 tablet (600 mg total) by mouth every 6 (six) hours as needed for mild pain   • sodium chloride (OCEAN) 0.65 % nasal spray 1 spray into each nostril as needed for congestion     Allergies   Allergen Reactions   • Famotidine Rash     Immunization History   Administered Date(s) Administered   • COVID-19 Pfizer Vac BIVALENT Bobo-sucrose 12 Yr+ IM (BOOSTER ONLY) 07/27/2023   • DTaP 2000, 2001, 2001, 06/07/2002, 03/09/2005   • HPV Quadrivalent 04/08/2015   • HPV9 04/08/2015, 09/25/2019   • Hep A, ped/adol, 2 dose 06/18/2008, 01/02/2009   • Hep B / HiB 2001, 2001   • Hep B, Adolescent or Pediatric 2001, 2001, 2001   • Hepatitis A 06/18/2008, 01/02/2009   • HiB 2001, 06/07/2002   • INFLUENZA 12/01/2016   • IPV 2001, 2001, 03/09/2005   • MMR 03/14/2002, 03/09/2005   • Meningococcal Conjugate (MCV4O) 07/19/2012, 07/17/2018   • Meningococcal MCV4, Unspecified 07/19/2012   • Meningococcal MCV4P 07/19/2012   • Pneumococcal Conjugate PCV 7 2001, 2001, 2001, 07/02/2004   • Tdap 07/19/2012, 07/27/2023   • Varicella 03/14/2002, 06/13/2007       Objective     /68 (BP Location: Right arm, Patient Position: Sitting, Cuff Size: Standard)   Pulse 81   Temp 97.5 °F (36.4 °C) (Temporal)   Resp 19   Ht 5' 7" (1.702 m)   Wt 111 kg (244 lb 9.6 oz)   SpO2 99%   BMI 38.31 kg/m²     Physical Exam  Vitals and nursing note reviewed. Constitutional:       General: She is not in acute distress. Appearance: She is well-developed. She is not diaphoretic. HENT:      Head: Normocephalic and atraumatic. Right Ear: External ear normal.      Left Ear: External ear normal.   Eyes:      Pupils: Pupils are equal, round, and reactive to light. Cardiovascular:      Rate and Rhythm: Normal rate and regular rhythm. Pulmonary:      Effort: Pulmonary effort is normal. No respiratory distress. Breath sounds: Normal breath sounds. No wheezing. Abdominal:      General: Bowel sounds are normal. There is no distension. Palpations: Abdomen is soft. Tenderness: There is no abdominal tenderness. There is no guarding or rebound. Musculoskeletal:         General: No deformity. Normal range of motion. Cervical back: Normal range of motion and neck supple. Thoracic back: Tenderness present. Comments: Bilateral thoracic TTP   Lymphadenopathy:      Cervical: No cervical adenopathy. Skin:     General: Skin is warm and dry. Capillary Refill: Capillary refill takes less than 2 seconds. Findings: No rash. Neurological:      Mental Status: She is alert and oriented to person, place, and time. Sensory: No sensory deficit.       Coordination: Coordination normal.      Deep Tendon Reflexes: Reflexes normal.   Psychiatric:         Behavior: Behavior normal.       [email or web address]

## 2023-07-28 RX ORDER — SODIUM CHLORIDE 9 MG/ML
125 INJECTION, SOLUTION INTRAVENOUS CONTINUOUS
Status: CANCELLED | OUTPATIENT
Start: 2023-07-28

## 2023-07-29 LAB
A ALTERNATA IGE QN: <0.1 KUA/I
A FUMIGATUS IGE QN: <0.1 KUA/I
ALMOND IGE QN: <0.1 KUA/I
ARA H6 PEANUT: <0.1 KUA/I
BERMUDA GRASS IGE QN: <0.1 KUA/I
BOXELDER IGE QN: 0.16 KUA/I
C HERBARUM IGE QN: <0.1 KUA/I
CASHEW NUT IGE QN: <0.1 KUA/I
CAT DANDER IGE QN: <0.1 KUA/I
CMN PIGWEED IGE QN: <0.1 KUA/I
CODFISH IGE QN: <0.1 KUA/I
COMMON RAGWEED IGE QN: 0.16 KUA/I
COTTONWOOD IGE QN: 0.15 KUA/I
D FARINAE IGE QN: <0.1 KUA/I
D PTERONYSS IGE QN: <0.1 KUA/I
DOG DANDER IGE QN: <0.1 KUA/I
EGG WHITE IGE QN: <0.1 KUA/I
GLUTEN IGE QN: <0.1 KUA/I
HAZELNUT IGE QN: 0.44 KUA/L
LONDON PLANE IGE QN: 2 KUA/I
MILK IGE QN: <0.1 KUA/I
MOUSE URINE PROT IGE QN: <0.1 KUA/I
MT JUNIPER IGE QN: <0.1 KUA/I
MUGWORT IGE QN: <0.1 KUA/I
P NOTATUM IGE QN: <0.1 KUA/I
PEANUT (RARA H) 1 IGE QN: <0.1 KUA/I
PEANUT (RARA H) 2 IGE QN: <0.1 KUA/I
PEANUT (RARA H) 3 IGE QN: <0.1 KUA/I
PEANUT (RARA H) 8 IGE QN: 0.4 KUA/I
PEANUT (RARA H) 9 IGE QN: <0.1 KUA/I
PEANUT IGE QN: 0.17 KUA/I
ROACH IGE QN: <0.1 KUA/I
SALMON IGE QN: <0.1 KUA/I
SCALLOP IGE QN: <0.1 KUA/L
SESAME SEED IGE QN: <0.1 KUA/I
SHEEP SORREL IGE QN: <0.1 KUA/I
SHRIMP IGE QN: <0.1 KUA/L
SILVER BIRCH IGE QN: 1.51 KUA/I
SOYBEAN IGE QN: <0.1 KUA/I
TIMOTHY IGE QN: <0.1 KUA/I
TOTAL IGE SMQN RAST: 32 KU/L (ref 0–113)
TOTAL IGE SMQN RAST: 32.7 KU/L (ref 0–113)
TUNA IGE QN: <0.1 KUA/I
WALNUT IGE QN: 1.28 KUA/I
WALNUT IGE QN: <0.1 KUA/I
WHEAT IGE QN: <0.1 KUA/I
WHITE ASH IGE QN: 0.1 KUA/I
WHITE ELM IGE QN: 0.33 KUA/I
WHITE MULBERRY IGE QN: <0.1 KUA/I
WHITE OAK IGE QN: 1.26 KUA/I

## 2023-07-31 ENCOUNTER — ANESTHESIA (OUTPATIENT)
Dept: GASTROENTEROLOGY | Facility: MEDICAL CENTER | Age: 23
End: 2023-07-31

## 2023-07-31 ENCOUNTER — ANESTHESIA EVENT (OUTPATIENT)
Dept: GASTROENTEROLOGY | Facility: MEDICAL CENTER | Age: 23
End: 2023-07-31

## 2023-07-31 ENCOUNTER — HOSPITAL ENCOUNTER (OUTPATIENT)
Dept: GASTROENTEROLOGY | Facility: MEDICAL CENTER | Age: 23
Setting detail: OUTPATIENT SURGERY
Discharge: HOME | End: 2023-07-31
Payer: COMMERCIAL

## 2023-07-31 VITALS
HEIGHT: 67 IN | SYSTOLIC BLOOD PRESSURE: 116 MMHG | RESPIRATION RATE: 20 BRPM | WEIGHT: 244 LBS | BODY MASS INDEX: 38.3 KG/M2 | OXYGEN SATURATION: 99 % | HEART RATE: 53 BPM | DIASTOLIC BLOOD PRESSURE: 74 MMHG | TEMPERATURE: 97.5 F

## 2023-07-31 DIAGNOSIS — R13.19 OTHER DYSPHAGIA: ICD-10-CM

## 2023-07-31 DIAGNOSIS — K21.9 GASTROESOPHAGEAL REFLUX DISEASE WITHOUT ESOPHAGITIS: ICD-10-CM

## 2023-07-31 LAB
GAMMA INTERFERON BACKGROUND BLD IA-ACNC: 0.01 IU/ML
M TB IFN-G BLD-IMP: NEGATIVE
M TB IFN-G CD4+ BCKGRND COR BLD-ACNC: 0.01 IU/ML
M TB IFN-G CD4+ BCKGRND COR BLD-ACNC: 0.01 IU/ML
MITOGEN IGNF BCKGRD COR BLD-ACNC: 9.98 IU/ML

## 2023-07-31 PROCEDURE — 43239 EGD BIOPSY SINGLE/MULTIPLE: CPT | Performed by: INTERNAL MEDICINE

## 2023-07-31 PROCEDURE — 88305 TISSUE EXAM BY PATHOLOGIST: CPT | Performed by: PATHOLOGY

## 2023-07-31 RX ORDER — OMEPRAZOLE 40 MG/1
40 CAPSULE, DELAYED RELEASE ORAL DAILY
Qty: 90 CAPSULE | Refills: 1 | Status: SHIPPED | OUTPATIENT
Start: 2023-07-31

## 2023-07-31 RX ORDER — PROPOFOL 10 MG/ML
INJECTION, EMULSION INTRAVENOUS AS NEEDED
Status: DISCONTINUED | OUTPATIENT
Start: 2023-07-31 | End: 2023-07-31

## 2023-07-31 RX ORDER — SODIUM CHLORIDE 9 MG/ML
125 INJECTION, SOLUTION INTRAVENOUS CONTINUOUS
Status: DISCONTINUED | OUTPATIENT
Start: 2023-07-31 | End: 2023-08-04 | Stop reason: HOSPADM

## 2023-07-31 RX ADMIN — PROPOFOL 100 MG: 10 INJECTION, EMULSION INTRAVENOUS at 10:42

## 2023-07-31 RX ADMIN — PROPOFOL 100 MG: 10 INJECTION, EMULSION INTRAVENOUS at 10:45

## 2023-07-31 RX ADMIN — SODIUM CHLORIDE 125 ML/HR: 0.9 INJECTION, SOLUTION INTRAVENOUS at 10:14

## 2023-07-31 RX ADMIN — PROPOFOL 200 MG: 10 INJECTION, EMULSION INTRAVENOUS at 10:41

## 2023-07-31 NOTE — ANESTHESIA POSTPROCEDURE EVALUATION
Post-Op Assessment Note    CV Status:  Stable  Pain Score: 1    Pain management: adequate     Mental Status:  Alert and awake   Hydration Status:  Euvolemic   PONV Controlled:  Controlled   Airway Patency:  Patent      Post Op Vitals Reviewed: Yes      Staff: Anesthesiologist         No notable events documented.     /65 (07/31/23 1051)    Temp      Pulse 67 (07/31/23 1051)   Resp 20 (07/31/23 1051)    SpO2 98 % (07/31/23 1051)

## 2023-07-31 NOTE — PROGRESS NOTES
Patient unable to urinate for urine pregnancy. Patient states she is not sexually active. Will have patient sign waiver.

## 2023-07-31 NOTE — H&P
History and Physical -  [facility]  Fransisca Angela 26 y.o. female MRN: [id number]                  HPI: Ellie Dominguez is a 26y.o. year old female who presents for dysphagia. REVIEW OF SYSTEMS: Per the HPI, and otherwise unremarkable. Historical Information   No past medical history on file. No past surgical history on file. Social History   Social History     Substance and Sexual Activity   Alcohol Use Never     Social History     Substance and Sexual Activity   Drug Use Never     Social History     Tobacco Use   Smoking Status Never   • Passive exposure: Never   Smokeless Tobacco Never     Family History   Problem Relation Age of Onset   • Diabetes Father        Meds/Allergies     (Not in a hospital admission)      Allergies   Allergen Reactions   • Famotidine Rash       Objective     not currently breastfeeding. PHYSICAL EXAMINATION:    General Appearance:   Alert, cooperative, no distress   HEENT:  Normocephalic, atraumatic, anicteric. Neck supple, symmetrical, trachea midline. Lungs:   Equal chest rise and unlabored breathing, normal effort, no coughing. Cardiovascular:   No visualized JVD. Abdomen:   No abdominal distension. Skin:   No jaundice, rashes, or lesions. Musculoskeletal:   Normal range of motion visualized. Psych:  Normal affect and normal insight. Neuro:  Alert and appropriate. ASSESSMENT/PLAN:  This is a 26y.o. year old female here for EGD, and she is stable and optimized for her procedure.

## 2023-07-31 NOTE — ANESTHESIA PREPROCEDURE EVALUATION
Procedure:  EGD    Relevant Problems   CARDIO   (+) Chronic bilateral thoracic back pain      MUSCULOSKELETAL   (+) Chronic bilateral thoracic back pain   (+) Chronic midline low back pain without sciatica      NEURO/PSYCH   (+) Anxiety   (+) Chronic bilateral thoracic back pain   (+) Chronic midline low back pain without sciatica        Physical Exam    Airway    Mallampati score: II         Dental       Cardiovascular  Rhythm: regular, Rate: normal,     Pulmonary  Breath sounds clear to auscultation,     Other Findings        Anesthesia Plan  ASA Score- 2     Anesthesia Type- IV sedation with anesthesia with ASA Monitors. Additional Monitors:   Airway Plan:           Plan Factors-Exercise tolerance (METS): >4 METS. Chart reviewed. Existing labs reviewed. Patient summary reviewed. Patient is not a current smoker. Patient not instructed to abstain from smoking on day of procedure. Patient did not smoke on day of surgery. Obstructive sleep apnea risk education given perioperatively. Induction- intravenous. Postoperative Plan-     Informed Consent- Anesthetic plan and risks discussed with patient.

## 2023-08-02 DIAGNOSIS — Z88.9 MULTIPLE ALLERGIES: Primary | ICD-10-CM

## 2023-08-03 PROCEDURE — 88305 TISSUE EXAM BY PATHOLOGIST: CPT | Performed by: PATHOLOGY

## 2023-08-18 ENCOUNTER — EVALUATION (OUTPATIENT)
Dept: PHYSICAL THERAPY | Facility: CLINIC | Age: 23
End: 2023-08-18
Payer: COMMERCIAL

## 2023-08-18 DIAGNOSIS — M54.6 CHRONIC BILATERAL THORACIC BACK PAIN: ICD-10-CM

## 2023-08-18 DIAGNOSIS — G89.29 CHRONIC BILATERAL THORACIC BACK PAIN: ICD-10-CM

## 2023-08-18 PROCEDURE — 97162 PT EVAL MOD COMPLEX 30 MIN: CPT

## 2023-08-18 PROCEDURE — 97110 THERAPEUTIC EXERCISES: CPT

## 2023-08-18 PROCEDURE — 97112 NEUROMUSCULAR REEDUCATION: CPT

## 2023-08-18 NOTE — PROGRESS NOTES
PT Evaluation     Today's date: 2023  Patient name: Belen Hoyt  : 2000  MRN: [id number]  Referring provider: Juan Carlos Lackey, *  Dx:   Encounter Diagnosis     ICD-10-CM    1. Chronic bilateral thoracic back pain  M54.6 Ambulatory Referral to Physical Therapy    G89.29           Start Time: 1200  Stop Time: 1300  Total time in clinic (min): 60 minutes    Assessment  Assessment details: Pt is a 26y.o. year old female presenting to physical therapy for Chronic bilateral thoracic back pain  She presents with the following impairments: decreased R scapular mobility, decreased L/S mobility, decreased T/S mobility, decreased C/S mobility, tight C/S and T/S paraspinals, hypomobility of all cervical and thoracic vertebral segments, increased tightness and TTP of b/ UT and LS affecting her function with bending, lifting, standing prolonged periods of time, standing prolonged periods of time, focusing, and functional ability. Pt will benefit from skilled physical therapy to address functional limitations noted in evaluation and meet patient goals. Impairments: abnormal muscle firing, abnormal or restricted ROM, activity intolerance, impaired physical strength, lacks appropriate home exercise program, pain with function and poor body mechanics    Symptom irritability: moderateBarriers to therapy: N/A  Understanding of Dx/Px/POC: good   Prognosis: good    Goals  ST. Pt will be independent with HEP. 2. Pt will improve T/S, C/S, and L/S mobility deficits to nil to improve ability to bend down to  objects. 3. Pt will improve b/l UT tightness to improve neck motion without pain. LT. Pt will have resolution of LS and UT tightness to improve headache symptoms. 2. Pt will improve R scapular mobility and strength to WNL compared to L side to improve ability to complete ADLs.     Plan  Patient would benefit from: PT eval and skilled physical therapy  Planned modality interventions: biofeedback, manual electrical stimulation, microcurrent electrical stimulation, TENS, electrical stimulation/Russian stimulation, thermotherapy: hydrocollator packs, cryotherapy and unattended electrical stimulation  Planned therapy interventions: abdominal trunk stabilization, joint mobilization, manual therapy, massage, ADL retraining, neuromuscular re-education, body mechanics training, patient education, postural training, strengthening, stretching, therapeutic activities, therapeutic exercise, flexibility, functional ROM exercises and home exercise program  Frequency: 2x week  Duration in weeks: 6  Treatment plan discussed with: patient        Subjective Evaluation    History of Present Illness  Mechanism of injury: Pt presents to the clinic with history of thoracic back pain for over a year without MEL. Pt stated that she has pain that sometimes radiates down into her RLE, specifically in her foot that comes and goes. Pt stated that she gets headaches everyday that vary in intensity from mild to migraines where she then needs to lay in the dark for the headache to go away. Pt stated that when she lays on her L side and has a pain that sometimes will wake her up on the R side. Pt stated that she does not work currently but she used to work at a warehouse picking up heavy boxes and the pain has improved slightly since she stopped working there. Pt stated that she did not mention the headaches to her doctor but will next time that she sees them. Pt stated that she has pain and difficulty standing for prolonged periods of time as well as sitting for prolonged periods of time. Pt stated that she is able to complete all of her normal ADLs without difficulty. Pt stated that the pain is across her middle back but is the worst towards the R side. Pt stated that sometimes the pain in the back causes her to have difficulty with breathing and has to take shallow breaths because of it.   Pain  Current pain ratin  At best pain ratin  Quality: sharp, burning and tight          Objective     Palpation   Left   No palpable tenderness to the cervical paraspinals and rhomboids. Tenderness of the levator scapulae, middle trapezius, sternocleidomastoid, suboccipitals and upper trapezius. Right   Tenderness of the cervical paraspinals, levator scapulae, middle trapezius, rhomboids, sternocleidomastoid, suboccipitals and upper trapezius. Tenderness   Cervical Spine   Tenderness in the spinous process and right scapula. No tenderness in the left scapula. Active Range of Motion   Cervical/Thoracic Spine       Cervical    Flexion:  with pain Restriction level: minimal  Extension:  with pain Restriction level: minimal  Left lateral flexion:  with pain Restriction level: moderate  Right lateral flexion:  Restriction level minimal  Left rotation:  WFL  Right rotation:  Restriction level: minimal    Thoracic    Flexion:  WFL  Extension:  with pain Restriction level: minimal  Left lateral flexion:  with pain Restriction level: minimal  Right lateral flexion:  WFL  Left rotation:  WFL  Right rotation:  [facility]    Lumbar   Flexion:  Restriction level: minimal  Extension:  WFL  Left lateral flexion:  WFL  Right lateral flexion:  WFL  Left rotation:  WFL  Right rotation:  [facility]    Scapular Mobility   Left Shoulder   Scapular mobility: WFL    Right Shoulder   Scapular mobility: good    Joint Play   Joints within functional limits: T2, T3 and T4     Hypomobile: C2, C3, C4, C5, C6, C7, T1, T5, T6, T7 and T8     Pain: C2, C3, C4, C5, C6, C7, T5, T6, T7 and T8     Strength/Myotome Testing   Cervical Spine     Left   Normal strength    Right   Normal strength    Lumbar   Left   Normal strength    Right   Normal strength    Tests   Cervical   Negative vertical compression and cervical distraction. Lumbar   Negative vertical compression.               Precautions: N/A    Date             Visit # IE            FOTO IE             Re-eval IE              Manuals 8/18                                                                Neuro Re-Ed 8/18            scap punches             No monies 5x BTB            Body blade             pallof press                                                    Ther Ex 8/18            UBE             UT str 2x30" ea            LS str 2x30" ea            Self UT STM 1'            T/S ext str 5x10"            Rows/exts 5x BTB row            Cross body str             SB rolling             Ther Activity 8/18                                      Gait Training 8/18                                      Modalities 8/18

## 2023-08-23 ENCOUNTER — OFFICE VISIT (OUTPATIENT)
Dept: PHYSICAL THERAPY | Facility: CLINIC | Age: 23
End: 2023-08-23
Payer: COMMERCIAL

## 2023-08-23 DIAGNOSIS — G89.29 CHRONIC BILATERAL THORACIC BACK PAIN: Primary | ICD-10-CM

## 2023-08-23 DIAGNOSIS — M54.6 CHRONIC BILATERAL THORACIC BACK PAIN: Primary | ICD-10-CM

## 2023-08-23 PROCEDURE — 97112 NEUROMUSCULAR REEDUCATION: CPT

## 2023-08-23 PROCEDURE — 97110 THERAPEUTIC EXERCISES: CPT

## 2023-08-23 NOTE — PROGRESS NOTES
Daily Note     Today's date: 2023  Patient name: Lucita Benton  : 2000  MRN: [id number]  Referring provider: Vernon Cobian, *  Dx:   Encounter Diagnosis     ICD-10-CM    1. Chronic bilateral thoracic back pain  M54.6     G89.29           Start Time: 1535  Stop Time: 1620  Total time in clinic (min): 45 minutes    Subjective: Pt presents to PT reporting no real change since the eval reporting pain in mid back. She grades the pain a 6/10. She states compliance with HEP and states no questions or problems. Pt reports soreness in between shoulder blades post PT session. Objective: See treatment diary below      Assessment: Tolerated treatment well with no complaint t/o session. Pt demonstrates appropriate challenge with TE performed. Patient demonstrated fatigue post treatment, exhibited good technique with therapeutic exercises and would benefit from continued PT to increase flexibility, strength and function. Plan: Continue per plan of care.       Precautions: N/A    Date            Visit # IE            FOTO IE             Re-eval IE              Manuals                                                                Neuro Re-Ed            scap punches  nv           No monies 5x BTB 15x BlueTB           Body blade  nv           pallof press  nv                                                  Ther Ex            UBE  Retro 4'           UT str 2x30" ea 2x30" ea           LS str 2x30" ea 2x30" ea           Self UT STM 1' np           T/S ext str 5x10" 10" x 10           Rows/exts 5x BTB row 15x BlkTB ea           Cross body str  10" x 5 ea           SB rolling  10" x 10           Ther Activity                                      Gait Training                                      Modalities

## 2023-08-25 ENCOUNTER — OFFICE VISIT (OUTPATIENT)
Dept: PHYSICAL THERAPY | Facility: CLINIC | Age: 23
End: 2023-08-25
Payer: COMMERCIAL

## 2023-08-25 DIAGNOSIS — G89.29 CHRONIC BILATERAL THORACIC BACK PAIN: Primary | ICD-10-CM

## 2023-08-25 DIAGNOSIS — M54.6 CHRONIC BILATERAL THORACIC BACK PAIN: Primary | ICD-10-CM

## 2023-08-25 PROCEDURE — 97140 MANUAL THERAPY 1/> REGIONS: CPT

## 2023-08-25 PROCEDURE — 97110 THERAPEUTIC EXERCISES: CPT

## 2023-08-25 NOTE — PROGRESS NOTES
Daily Note     Today's date: 2023  Patient name: Rosendo Davila  : 2000  MRN: [id number]  Referring provider: Lawernce Hodgkin, *  Dx:   Encounter Diagnosis     ICD-10-CM    1. Chronic bilateral thoracic back pain  M54.6     G89.29           Start Time: 1100  Stop Time: 1145  Total time in clinic (min): 45 minutes    Subjective: Pt stated that she has a bit of soreness on the L side of her middle back and has a headache with discomfort at the base of the skull. Objective: See treatment diary below      Assessment: Pt tolerated progression of duration of warm up on UBE well with mild fatigue post performance but no increase in discomfort during or after activity. Pt required mild verbal and tactile cueing to perform push up plus activity with proper form. Pt tolerated progression of resistance with shoulder and scapular strengthening activities with mild fatigue post session. Pt tolerated manual C/S PROM with reported decrease in stiffness post manual treatment, but had increased discomfort when stretching L UT and increased pain in R UT with UT STM. Pt would benefit from continued skilled PT to improve C/S mobility, T/S mobility, scapular stability, core strength, and functional ability. Plan: Continue per plan of care. Progress treatment as tolerated.        Precautions: N/A    Date           Visit # IE 2 3          FOTO IE             Re-eval IE              Manuals           C/S PROM   DK                                                 Neuro Re-Ed           scap punches  nv 20x 5# baton          No monies 5x BTB 15x BlueTB 20x BTB          Body blade  nv           pallof press  nv 15x ea 11#          UTR   15x ea 11#          Push up plus   20x                        Ther Ex           UBE  Retro 4' 3'/3'          UT str 2x30" ea 2x30" ea man          LS str 2x30" ea 2x30" ea           Self UT STM 1' np           T/S ext str 5x10" 10" x 10 10x10"          Rows/exts 5x BTB row 15x BlkTB ea 20x 13# ea          Cross body str  10" x 5 ea           SB rolling  10" x 10 10x10" fwd, R          Ther Activity 8/18 8/23                                     Gait Training 8/18 8/23                                     Modalities 8/18 8/23

## 2023-08-29 ENCOUNTER — OFFICE VISIT (OUTPATIENT)
Dept: PHYSICAL THERAPY | Facility: CLINIC | Age: 23
End: 2023-08-29
Payer: COMMERCIAL

## 2023-08-29 DIAGNOSIS — G89.29 CHRONIC BILATERAL THORACIC BACK PAIN: Primary | ICD-10-CM

## 2023-08-29 DIAGNOSIS — M54.6 CHRONIC BILATERAL THORACIC BACK PAIN: Primary | ICD-10-CM

## 2023-08-29 PROCEDURE — 97110 THERAPEUTIC EXERCISES: CPT

## 2023-08-29 PROCEDURE — 97010 HOT OR COLD PACKS THERAPY: CPT

## 2023-08-29 PROCEDURE — 97140 MANUAL THERAPY 1/> REGIONS: CPT

## 2023-08-29 NOTE — PROGRESS NOTES
Daily Note     Today's date: 2023  Patient name: Tiffanie Johnson  : 2000  MRN: [id number]  Referring provider: Gopal Tariq, *  Dx:   Encounter Diagnosis     ICD-10-CM    1. Chronic bilateral thoracic back pain  M54.6     G89.29           Start Time: 1520  Stop Time: 1608  Total time in clinic (min): 48 minutes    Subjective: Pt stated that her shoulders are moving around a bit more and have less pain but continues to have tightness and discomfort in the middle of her back. Objective: See treatment diary below      Assessment: Pt tolerated manual T/S paraspinal STM, and C/S PROM with reported decrease in stiffness post manual treatment. Pt performed T/S mobility activities well throughout session with good form and mild decrease in stiffness post performance. Pt performed standing core strengthening activities well with mild fatigue post performance. Pt tolerated application of MHP at end of session well with reported decrease in discomfort post modality. Pt would benefit from continued skilled PT to improve T/S mobility, C/S mobility, flexibility, scapular mobility, and functional ability. Plan: Continue per plan of care. Progress treatment as tolerated.        Precautions: N/A    Date          Visit # IE 2 3 4         FOTO IE             Re-eval IE              Manuals          C/S PROM   DK DK         T/S paraspinal STM    DK         T/S PA mobs    DK gr II-III                      Neuro Re-Ed          scap punches  nv 20x 5# baton 30x 6# DB         No monies 5x BTB 15x BlueTB 20x BTB          Body blade  nv           pallof press  nv 15x ea 11#          UTR   15x ea 11# 15x ea 11#         Push up plus   20x           Cat/cow    10x3" ea         Ther Ex          UBE  Retro 4' 3'/3' 3'/3'         UT str 2x30" ea 2x30" ea man          LS str 2x30" ea 2x30" ea           Self UT STM 1' np           T/S ext str 5x10" 10" x 10 10x10" 10x10"         Rows/exts 5x BTB row 15x BlkTB ea 20x 13# ea          Cross body str  10" x 5 ea           SB rolling  10" x 10 10x10" fwd, R 10x10" R         Ther Activity 8/18 8/23                                     Gait Training 8/18 8/23                                     Modalities 8/18 8/23 8/29         MHP    10' post

## 2023-09-01 ENCOUNTER — OFFICE VISIT (OUTPATIENT)
Dept: PHYSICAL THERAPY | Facility: CLINIC | Age: 23
End: 2023-09-01
Payer: COMMERCIAL

## 2023-09-01 DIAGNOSIS — M54.6 CHRONIC BILATERAL THORACIC BACK PAIN: Primary | ICD-10-CM

## 2023-09-01 DIAGNOSIS — G89.29 CHRONIC BILATERAL THORACIC BACK PAIN: Primary | ICD-10-CM

## 2023-09-01 PROCEDURE — 97112 NEUROMUSCULAR REEDUCATION: CPT

## 2023-09-01 PROCEDURE — 97110 THERAPEUTIC EXERCISES: CPT

## 2023-09-01 NOTE — PROGRESS NOTES
Daily Note     Today's date: 2023  Patient name: Rosanne Gardner  : 2000  MRN: [id number]  Referring provider: Alfredo Cunningham, *  Dx:   Encounter Diagnosis     ICD-10-CM    1. Chronic bilateral thoracic back pain  M54.6     G89.29           Start Time: 1130  Stop Time: 1220  Total time in clinic (min): 50 minutes    Subjective: Pt stated that she has a lot of soreness today on the L side of her middle back and feels, "as if it is bruised."      Objective: See treatment diary below      Assessment: Pt was educated on proper hold duration of exercises as she has the tendency to rush stretching and mobility activities, pt adapted well to instruction. Pt tolerated manual T/S paraspinal STM and grade II-III T/S PA mobilizations with reported decrease in stiffness post manual treatment. Pt responded well to performance of cat/cow and push up plus activities with focus on scapular retraction with increased holds today and would benefit from continued performance. Pt did not have much resolution of symptoms today compared to last visit with performance of mobility activities. Pt reported slight sharp pain on the L side of middle back with performance of pallof press at end of session that subsided with rest break. Pt would benefit from continued skilled PT to improve T/S mobility, scapular stability, mobility, flexibility, core strength, and functional ability. Plan: Continue per plan of care. Progress treatment as tolerated.        Precautions: N/A    Date         Visit # IE 2 3 4 5        FOTO IE             Re-eval IE              Manuals         C/S PROM   DK DK         T/S paraspinal STM    DK DK        T/S PA mobs    DK gr II-III DK gr II-III                     Neuro Re-Ed         scap punches  nv 20x 5# baton 30x 6# DB 2x10 purple TB stand        No monies 5x BTB 15x BlueTB 20x BTB  20x purp TB        PPU     10x2" Body blade  nv           pallof press  nv 15x ea 11#  10x 15#, 15x 12# ea        UTR   15x ea 11# 15x ea 11# 20x ea purple TB        Push up plus   20x   15x5"        Doorway str     5x15"        Cat/cow    10x3" ea 5x10" ea        Ther Ex 8/18 8/23 8/25 8/29 9/1        UBE  Retro 4' 3'/3' 3'/3' 3'/3'        UT str 2x30" ea 2x30" ea man          LS str 2x30" ea 2x30" ea           Self UT STM 1' np           T/S ext str 5x10" 10" x 10 10x10" 10x10" 10x10"        Rows/exts 5x BTB row 15x BlkTB ea 20x 13# ea          Cross body str  10" x 5 ea   5x15" ea        SB rolling  10" x 10 10x10" fwd, R 10x10" R 10x10" R        Ther Activity 8/18 8/23                                     Gait Training 8/18 8/23                                     Modalities 8/18 8/23 8/29 9/1        MHP    10' post

## 2023-09-05 ENCOUNTER — APPOINTMENT (OUTPATIENT)
Dept: PHYSICAL THERAPY | Facility: CLINIC | Age: 23
End: 2023-09-05
Payer: COMMERCIAL

## 2023-09-08 ENCOUNTER — OFFICE VISIT (OUTPATIENT)
Dept: PHYSICAL THERAPY | Facility: CLINIC | Age: 23
End: 2023-09-08
Payer: COMMERCIAL

## 2023-09-08 DIAGNOSIS — M54.6 CHRONIC BILATERAL THORACIC BACK PAIN: Primary | ICD-10-CM

## 2023-09-08 DIAGNOSIS — G89.29 CHRONIC BILATERAL THORACIC BACK PAIN: Primary | ICD-10-CM

## 2023-09-08 PROCEDURE — 97140 MANUAL THERAPY 1/> REGIONS: CPT

## 2023-09-08 NOTE — PROGRESS NOTES
Daily Note     Today's date: 2023  Patient name: Yaquelin Dickinson  : 2000  MRN: [id number]  Referring provider: Patti Diaz, *  Dx:   Encounter Diagnosis     ICD-10-CM    1. Chronic bilateral thoracic back pain  M54.6     G89.29           Start Time: 1120  Stop Time: 1205  Total time in clinic (min): 45 minutes    Subjective: Pt stated that her posture feels better as well as her shoulders ability to move but she is continuing to have the middle back pain that has not changed much since beginning PT. Objective: See treatment diary below      Assessment: Pt session today focused on manual techniques to improve T/S pain and stiffness. Pt tolerated warm up on UBE at start of session without increase in discomfort during or after activity. Pt tolerated manual T/S paraspinal STM with therapist OP, golf ball, lacrosse ball, and backknobber with sustained pressure with reported decrease in stiffness post manual treatment. Pt also tolerated manual grade III-IV PA glides, grade V seated T/S mobilization, and grade V seated CT junction mobilization with reported decrease in stiffness post manual treatment. Pt was educated on exercises performed on half foam roll, which pt performed well without increase in discomfort during or after performance. Pt responded well to manual treatment today, plan to mainly perform manuals and foam roll activities next visit. Pt would benefit from continued skilled PT to improve T/S mobility, flexibility, and functional ability. Plan: Continue per plan of care. Progress treatment as tolerated.        Precautions: N/A    Date        Visit # IE 2 3 4 5 6       FOTO IE      nv       Re-eval IE              Manuals        C/S PROM   DK DK         T/S paraspinal STM    DK DK DK       T/S PA mobs    DK gr II-III DK gr II-III DK       Grade V T/S and CT mobs      DK       Neuro Re-Ed  scap punches  nv 20x 5# baton 30x 6# DB 2x10 purple TB stand        No monies 5x BTB 15x BlueTB 20x BTB  20x purp TB        PPU     10x2"        Body blade  nv           pallof press  nv 15x ea 11#  10x 15#, 15x 12# ea        UTR   15x ea 11# 15x ea 11# 20x ea purple TB        Push up plus   20x   15x5"        Doorway str     5x15"        Cat/cow    10x3" ea 5x10" ea        Ther Ex 8/18 8/23 8/25 8/29 9/1 9/8       UBE  Retro 4' 3'/3' 3'/3' 3'/3' 3'/3'       UT str 2x30" ea 2x30" ea man          LS str 2x30" ea 2x30" ea           Self UT STM 1' np           T/S ext str 5x10" 10" x 10 10x10" 10x10" 10x10" 5x5" rot ea       Rows/exts 5x BTB row 15x BlkTB ea 20x 13# ea          Cross body str  10" x 5 ea   5x15" ea        Lat stretch sit      3x30"       Half foam roll sup: self hugs, baton raises      10x10" ea       SB rolling  10" x 10 10x10" fwd, R 10x10" R 10x10" R        Ther Activity 8/18 8/23                                     Gait Training 8/18 8/23                                     Modalities 8/18 8/23 8/29 9/1 9/8       MHP    10' post

## 2023-09-15 ENCOUNTER — OFFICE VISIT (OUTPATIENT)
Dept: PHYSICAL THERAPY | Facility: CLINIC | Age: 23
End: 2023-09-15
Payer: COMMERCIAL

## 2023-09-15 DIAGNOSIS — G89.29 CHRONIC BILATERAL THORACIC BACK PAIN: Primary | ICD-10-CM

## 2023-09-15 DIAGNOSIS — M54.6 CHRONIC BILATERAL THORACIC BACK PAIN: Primary | ICD-10-CM

## 2023-09-15 PROCEDURE — 97140 MANUAL THERAPY 1/> REGIONS: CPT

## 2023-09-15 PROCEDURE — 97010 HOT OR COLD PACKS THERAPY: CPT

## 2023-09-15 NOTE — PROGRESS NOTES
Daily Note     Today's date: 9/15/2023  Patient name: Rosanne Gardner  : 2000  MRN: [id number]  Referring provider: Alfredo Cunningham, *  Dx:   Encounter Diagnosis     ICD-10-CM    1. Chronic bilateral thoracic back pain  M54.6     G89.29           Start Time: 1120  Stop Time: 1210  Total time in clinic (min): 50 minutes    Subjective: Pt stated that her back has been very sore over this past week but it felt pretty good after last visit when focusing on manual therapy and would like to try that again today. Objective: See treatment diary below      Assessment: Pt tolerated application of MHP at beginning of session well with reported decrease in discomfort post modality. Pt tolerated manual T/S paraspinal STM with various tools and T/S PA mobs with reported decrease in stiffness post manual treatment. Pt also tolerated grade V T/S and CT junction mobilizations with reported decrease in discomfort post performance with cavitations noted. Pt performed mobility activities on half foam with mild decrease in stiffness post session. Pt would benefit from continued skilled PT to improve T/S flexibility, mobility, scapular mobility, and functional ability. Plan: Continue per plan of care. Progress treatment as tolerated.        Precautions: N/A    Date 8/18 8/23 8/25 8/29 9/1 9/8 9/15      Visit # IE 2 3 4 5 6 7      FOTO IE      nv done      Re-eval IE              Manuals 8/18 8/23 8/25 8/29 9/1 9/8 9/15      C/S PROM   DK DK         T/S paraspinal STM    DK DK DK DK      T/S PA mobs    DK gr II-III DK gr II-III DK DK gr II-III      Grade V T/S and CT mobs      DK       Neuro Re-Ed 8/18 8/23 8/25 8/29 9/1 9/8 9/15      scap punches  nv 20x 5# baton 30x 6# DB 2x10 purple TB stand  2x10 5# baton on foam roll      No monies 5x BTB 15x BlueTB 20x BTB  20x purp TB        PPU     10x2"        Body blade  nv           pallof press  nv 15x ea 11#  10x 15#, 15x 12# ea        UTR   15x ea 11# 15x ea 11# 20x ea purple TB        Push up plus   20x   15x5"        Doorway str     5x15"        Cat/cow    10x3" ea 5x10" ea        Ther Ex 8/18 8/23 8/25 8/29 9/1 9/8 9/15      UBE  Retro 4' 3'/3' 3'/3' 3'/3' 3'/3'       UT str 2x30" ea 2x30" ea man          LS str 2x30" ea 2x30" ea           Self UT STM 1' np           T/S ext str 5x10" 10" x 10 10x10" 10x10" 10x10" 5x5" rot ea       Rows/exts 5x BTB row 15x BlkTB ea 20x 13# ea          Cross body str  10" x 5 ea   5x15" ea        Lat stretch sit      3x30"       Half foam roll sup: self hugs, baton raises      10x10" ea 10x10" ea      SB rolling  10" x 10 10x10" fwd, R 10x10" R 10x10" R        Ther Activity 8/18 8/23                                     Gait Training 8/18 8/23                                     Modalities 8/18 8/23  8/29 9/1 9/8 9/15      MHP    10' post   8' pre

## 2023-09-25 ENCOUNTER — OFFICE VISIT (OUTPATIENT)
Dept: FAMILY MEDICINE CLINIC | Facility: CLINIC | Age: 23
End: 2023-09-25

## 2023-09-25 VITALS
WEIGHT: 255 LBS | OXYGEN SATURATION: 99 % | SYSTOLIC BLOOD PRESSURE: 96 MMHG | HEART RATE: 66 BPM | TEMPERATURE: 97.4 F | BODY MASS INDEX: 40.02 KG/M2 | HEIGHT: 67 IN | DIASTOLIC BLOOD PRESSURE: 72 MMHG | RESPIRATION RATE: 18 BRPM

## 2023-09-25 DIAGNOSIS — Z12.4 CERVICAL CANCER SCREENING: ICD-10-CM

## 2023-09-25 DIAGNOSIS — N62 MACROMASTIA: Primary | ICD-10-CM

## 2023-09-25 DIAGNOSIS — Z23 ENCOUNTER FOR IMMUNIZATION: ICD-10-CM

## 2023-09-25 DIAGNOSIS — K21.9 GASTROESOPHAGEAL REFLUX DISEASE WITHOUT ESOPHAGITIS: ICD-10-CM

## 2023-09-25 PROCEDURE — 99214 OFFICE O/P EST MOD 30 MIN: CPT | Performed by: NURSE PRACTITIONER

## 2023-09-25 PROCEDURE — 90686 IIV4 VACC NO PRSV 0.5 ML IM: CPT | Performed by: NURSE PRACTITIONER

## 2023-09-25 PROCEDURE — 90471 IMMUNIZATION ADMIN: CPT | Performed by: NURSE PRACTITIONER

## 2023-09-25 RX ORDER — OMEPRAZOLE 40 MG/1
40 CAPSULE, DELAYED RELEASE ORAL DAILY
Qty: 30 CAPSULE | Refills: 0 | Status: SHIPPED | OUTPATIENT
Start: 2023-09-25

## 2023-09-25 NOTE — PROGRESS NOTES
Name: Diandra De Leon      : 2000      MRN: [id number]  Encounter Provider: CYRUS Hector  Encounter Date: 2023   Encounter department: [address] [facility],[address]     1. Macromastia  Assessment & Plan:  Patient has been participating in PT with minimal improvement in sx   At this time will place referral to plastics for further evaluation/ recommendations     Orders:  -     Ambulatory Referral to Plastic Surgery; Future    2. Cervical cancer screening  -     Ambulatory Referral to Obstetrics / Gynecology; Future    3. Encounter for immunization  -     influenza vaccine, quadrivalent, 0.5 mL, preservative-free, for adult and pediatric patients 6 mos+ (AFLURIA, FLUARIX, FLULAVAL, FLUZONE)         Subjective       25 YO female here for follow up of chronic thoracic back pain. Back Pain  This is a chronic problem. The current episode started more than 1 month ago. The problem occurs daily. The problem is unchanged. The pain is present in the thoracic spine. The quality of the pain is described as aching, burning and cramping. The pain does not radiate. The pain is at a severity of 8/10. The pain is moderate. The pain is the same all the time. The symptoms are aggravated by bending, twisting and standing. Stiffness is present all day. Pertinent negatives include no abdominal pain, chest pain, dysuria or fever. She has tried analgesics, home exercises and muscle relaxant (Physical therapy ) for the symptoms. Improvement on treatment: minimal.     Review of Systems   Constitutional: Negative for chills and fever. HENT: Negative for ear pain and sore throat. Eyes: Negative for pain and visual disturbance. Respiratory: Negative for cough and shortness of breath. Cardiovascular: Negative for chest pain and palpitations. Gastrointestinal: Negative for abdominal pain and vomiting. Genitourinary: Negative for dysuria and hematuria. Musculoskeletal: Positive for back pain. Negative for arthralgias. Skin: Negative for color change and rash. Neurological: Negative for seizures and syncope. All other systems reviewed and are negative. No past medical history on file. No past surgical history on file. Family History   Problem Relation Age of Onset   • Diabetes Father      Social History     Socioeconomic History   • Marital status: Single     Spouse name: None   • Number of children: None   • Years of education: None   • Highest education level: None   Occupational History   • None   Tobacco Use   • Smoking status: Never     Passive exposure: Never   • Smokeless tobacco: Never   Vaping Use   • Vaping Use: Never used   Substance and Sexual Activity   • Alcohol use: Never   • Drug use: Never   • Sexual activity: None   Other Topics Concern   • None   Social History Narrative    [employer]     Social Determinants of Health     Financial Resource Strain: Low Risk  (12/3/2022)    Overall Financial Resource Strain (CARDIA)    • Difficulty of Paying Living Expenses: Not hard at all   Food Insecurity: No Food Insecurity (12/3/2022)    Hunger Vital Sign    • Worried About Running Out of Food in the Last Year: Never true    • Ran Out of Food in the Last Year: Never true   Transportation Needs: No Transportation Needs (12/3/2022)    PRAPARE - Transportation    • Lack of Transportation (Medical): No    • Lack of Transportation (Non-Medical):  No   Physical Activity: Not on file   Stress: Not on file   Social Connections: Not on file   Intimate Partner Violence: Not on file   Housing Stability: Not on file     Current Outpatient Medications on File Prior to Visit   Medication Sig   • acetaminophen (TYLENOL) 650 mg CR tablet Take 1 tablet (650 mg total) by mouth every 8 (eight) hours as needed for mild pain   • azelastine (ASTELIN) 0.1 % nasal spray 1 spray into each nostril 2 (two) times a day Use in each nostril as directed   • Benzocaine-Menthol 6-10 MG lozenge Take 1 lozenge by mouth every 2 (two) hours as needed for sore throat   • brompheniramine-pseudoephedrine-DM 30-2-10 MG/5ML syrup Take 10 mL by mouth 3 (three) times a day as needed for cough or congestion   • fexofenadine (ALLEGRA) 180 MG tablet Take 1 tablet (180 mg total) by mouth daily   • ibuprofen (MOTRIN) 600 mg tablet Take 1 tablet (600 mg total) by mouth every 6 (six) hours as needed for mild pain   • sodium chloride (OCEAN) 0.65 % nasal spray 1 spray into each nostril as needed for congestion   • [DISCONTINUED] omeprazole (PriLOSEC) 40 MG capsule Take 1 capsule (40 mg total) by mouth daily     Allergies   Allergen Reactions   • Famotidine Rash     Immunization History   Administered Date(s) Administered   • COVID-19 Pfizer Vac BIVALENT Bobo-sucrose 12 Yr+ IM 07/27/2023, 07/27/2023   • DTaP 2000, 2000, 2001, 06/07/2002, 03/09/2005   • HPV Quadrivalent 04/08/2015   • HPV9 04/08/2015, 09/25/2019   • Hep A, ped/adol, 2 dose 06/18/2008, 01/02/2009   • Hep B / HiB 2001, 2001   • Hep B, Adolescent or Pediatric 2001, 2001, 2001   • Hepatitis A 06/18/2008, 01/02/2009   • HiB 2001, 06/07/2002   • INFLUENZA 12/01/2016   • IPV 2000, 2001, 03/09/2005   • Influenza, injectable, quadrivalent, preservative free 0.5 mL 09/25/2023   • MMR 03/14/2002, 03/09/2005   • Meningococcal Conjugate (MCV4O) 07/19/2012, 07/17/2018   • Meningococcal MCV4, Unspecified 07/19/2012   • Meningococcal MCV4P 07/19/2012   • Pneumococcal Conjugate PCV 7 2001, 2001, 2001, 07/02/2004   • Tdap 07/19/2012, 07/27/2023, 07/27/2023   • Varicella 03/14/2002, 06/13/2007       Objective     BP 96/72 (BP Location: Left arm, Patient Position: Sitting, Cuff Size: Large)   Pulse 66   Temp (!) 97.4 °F (36.3 °C) (Temporal)   Resp 18   Ht 5' 7" (1.702 m)   Wt 116 kg (255 lb)   SpO2 99%   BMI 39.94 kg/m²     Physical Exam  Vitals and nursing note reviewed. Constitutional:       General: She is not in acute distress. Appearance: She is well-developed. She is not diaphoretic. HENT:      Head: Normocephalic and atraumatic. Right Ear: External ear normal.      Left Ear: External ear normal.   Eyes:      Pupils: Pupils are equal, round, and reactive to light. Cardiovascular:      Rate and Rhythm: Normal rate and regular rhythm. Pulmonary:      Effort: Pulmonary effort is normal. No respiratory distress. Breath sounds: Normal breath sounds. No wheezing. Abdominal:      General: Bowel sounds are normal. There is no distension. Palpations: Abdomen is soft. Tenderness: There is no abdominal tenderness. There is no guarding or rebound. Musculoskeletal:         General: No deformity. Normal range of motion. Cervical back: Normal range of motion and neck supple. Thoracic back: Tenderness present. Comments: Bilateral thoracic TTP   Lymphadenopathy:      Cervical: No cervical adenopathy. Skin:     General: Skin is warm and dry. Capillary Refill: Capillary refill takes less than 2 seconds. Findings: No rash. Neurological:      Mental Status: She is alert and oriented to person, place, and time. Sensory: No sensory deficit.       Coordination: Coordination normal.      Deep Tendon Reflexes: Reflexes normal.   Psychiatric:         Behavior: Behavior normal.       Lauri Olmedo

## 2023-09-25 NOTE — ASSESSMENT & PLAN NOTE
Patient has been participating in PT with minimal improvement in sx   At this time will place referral to plastics for further evaluation/ recommendations

## 2023-09-28 ENCOUNTER — TELEPHONE (OUTPATIENT)
Age: 23
End: 2023-09-28

## 2023-09-28 ENCOUNTER — TELEPHONE (OUTPATIENT)
Dept: PLASTIC SURGERY | Facility: CLINIC | Age: 23
End: 2023-09-28

## 2023-09-28 NOTE — TELEPHONE ENCOUNTER
Spoke to patient and reviewed criteria for breast reduction. She will let me know when October is complete to then make consultation with appropriate doctor and for the correct amount of time. Also emailed criteria for reference.

## 2023-10-11 ENCOUNTER — OFFICE VISIT (OUTPATIENT)
Dept: OBGYN CLINIC | Facility: CLINIC | Age: 23
End: 2023-10-11

## 2023-10-11 VITALS
DIASTOLIC BLOOD PRESSURE: 66 MMHG | HEART RATE: 88 BPM | SYSTOLIC BLOOD PRESSURE: 118 MMHG | BODY MASS INDEX: 40.13 KG/M2 | WEIGHT: 256.2 LBS

## 2023-10-11 DIAGNOSIS — Z01.419 ENCOUNTER FOR GYNECOLOGICAL EXAMINATION WITHOUT ABNORMAL FINDING: Primary | ICD-10-CM

## 2023-10-11 DIAGNOSIS — Z11.3 SCREEN FOR STD (SEXUALLY TRANSMITTED DISEASE): ICD-10-CM

## 2023-10-11 DIAGNOSIS — Z12.39 ENCOUNTER FOR BREAST CANCER SCREENING USING NON-MAMMOGRAM MODALITY: ICD-10-CM

## 2023-10-11 DIAGNOSIS — Z12.4 CERVICAL CANCER SCREENING: ICD-10-CM

## 2023-10-11 DIAGNOSIS — Z23 NEED FOR HPV VACCINATION: ICD-10-CM

## 2023-10-11 PROBLEM — Z00.00 ANNUAL PHYSICAL EXAM: Status: RESOLVED | Noted: 2021-01-18 | Resolved: 2023-10-11

## 2023-10-11 PROBLEM — M54.6 CHRONIC BILATERAL THORACIC BACK PAIN: Status: RESOLVED | Noted: 2022-12-03 | Resolved: 2023-10-11

## 2023-10-11 PROBLEM — E66.9 OBESITY (BMI 35.0-39.9 WITHOUT COMORBIDITY): Status: RESOLVED | Noted: 2019-09-25 | Resolved: 2023-10-11

## 2023-10-11 PROBLEM — J32.0 CHRONIC MAXILLARY SINUSITIS: Status: RESOLVED | Noted: 2023-03-03 | Resolved: 2023-10-11

## 2023-10-11 PROBLEM — J30.1 SEASONAL ALLERGIC RHINITIS DUE TO POLLEN: Status: RESOLVED | Noted: 2019-09-25 | Resolved: 2023-10-11

## 2023-10-11 PROBLEM — G89.29 CHRONIC MIDLINE LOW BACK PAIN WITHOUT SCIATICA: Status: RESOLVED | Noted: 2022-12-03 | Resolved: 2023-10-11

## 2023-10-11 PROBLEM — M54.50 CHRONIC MIDLINE LOW BACK PAIN WITHOUT SCIATICA: Status: RESOLVED | Noted: 2022-12-03 | Resolved: 2023-10-11

## 2023-10-11 PROBLEM — N62 MACROMASTIA: Status: RESOLVED | Noted: 2022-12-03 | Resolved: 2023-10-11

## 2023-10-11 PROBLEM — G89.29 CHRONIC PAIN OF RIGHT KNEE: Status: RESOLVED | Noted: 2022-12-03 | Resolved: 2023-10-11

## 2023-10-11 PROBLEM — M25.561 CHRONIC PAIN OF RIGHT KNEE: Status: RESOLVED | Noted: 2022-12-03 | Resolved: 2023-10-11

## 2023-10-11 PROBLEM — F41.9 ANXIETY: Status: RESOLVED | Noted: 2022-12-03 | Resolved: 2023-10-11

## 2023-10-11 PROBLEM — G89.29 CHRONIC BILATERAL THORACIC BACK PAIN: Status: RESOLVED | Noted: 2022-12-03 | Resolved: 2023-10-11

## 2023-10-11 PROCEDURE — 90471 IMMUNIZATION ADMIN: CPT | Performed by: NURSE PRACTITIONER

## 2023-10-11 PROCEDURE — 87491 CHLMYD TRACH DNA AMP PROBE: CPT | Performed by: NURSE PRACTITIONER

## 2023-10-11 PROCEDURE — 90651 9VHPV VACCINE 2/3 DOSE IM: CPT | Performed by: NURSE PRACTITIONER

## 2023-10-11 PROCEDURE — 99385 PREV VISIT NEW AGE 18-39: CPT | Performed by: NURSE PRACTITIONER

## 2023-10-11 PROCEDURE — G0145 SCR C/V CYTO,THINLAYER,RESCR: HCPCS | Performed by: NURSE PRACTITIONER

## 2023-10-11 PROCEDURE — 87591 N.GONORRHOEAE DNA AMP PROB: CPT | Performed by: NURSE PRACTITIONER

## 2023-10-11 NOTE — LETTER
10/13/2023    To Fransisca Angela  : 2000      This letter is to advise you that your recent CULTURES for gonorrhea and chlamydia were reviewed by me and are NORMAL. Please contact the office for an appointment if you have any additional concerns.     [address] [place]

## 2023-10-11 NOTE — PATIENT INSTRUCTIONS
Thank you for your confidence in our team.   We appreciate you and welcome your feedback. If you receive a survey from us, please take a few moments to let us know how we are doing. Sincerely,  CYRUS Golden       OBESITY     Obesity is defined as a body mass index (BMI) which is greater than 30. Your Body mass index is 40.13 kg/m². .    The risks of obesity include  many health problems, such as injuries or physical disability. You may need tests to check for the following:  Diabetes     High blood pressure or high cholesterol     Heart disease     Gallbladder or liver disease     Cancer of the colon, breast, prostate, liver, or kidney     Sleep apnea     Arthritis or gout    Seek care immediately if:   You have a severe headache, confusion, or difficulty speaking. You have weakness on one side of your body. You have chest pain, sweating, or shortness of breath. Contact your healthcare provider if:   You have symptoms of gallbladder or liver disease, such as pain in your upper abdomen. You have knee or hip pain and discomfort while walking. You have symptoms of diabetes, such as intense hunger and thirst, and frequent urination. You have symptoms of sleep apnea, such as snoring or daytime sleepiness. You have questions or concerns about your condition or care. Treatment for obesity  focuses on helping you lose weight to improve your health. Even a small decrease in BMI can reduce the risk for many health problems. Your healthcare provider will help you set a weight-loss goal.  Lifestyle changes  are the first step in treating obesity. These include making healthy food choices and getting regular physical activity. Your healthcare provider may suggest a weight-loss program that involves coaching, education, and therapy. Medicine  may help you lose weight when it is used with a healthy diet and physical activity.      Surgery  can help you lose weight if you are very obese and have other health problems. There are several types of weight-loss surgery. Ask your healthcare provider for more information. Be successful losing weight:   Set small, realistic goals. An example of a small goal is to walk for 20 minutes 5 days a week. Anther goal is to lose 5% of your body weight. Tell friends, family members, and coworkers about your goals  and ask for their support. Ask a friend to lose weight with you, or join a weight-loss support group. Identify foods or triggers that may cause you to overeat , and find ways to avoid them. Remove tempting high-calorie foods from your home and workplace. Place a bowl of fresh fruit on your kitchen counter. If stress causes you to eat, then find other ways to cope with stress. Keep a diary to track what you eat and drink. Also write down how many minutes of physical activity you do each day. Weigh yourself once a week and record it in your diary. Eating changes: You will need to eat 500 to 1,000 fewer calories each day than you currently eat to lose 1 to 2 pounds a week. The following changes will help you cut calories:  Eat smaller portions. Use small plates, no larger than 9 inches in diameter. Fill your plate half full of fruits and vegetables. Measure your food using measuring cups until you know what a serving size looks like. Eat 3 meals and 1 or 2 snacks each day. Plan your meals in advance. Wiley Neal and eat at home most of the time. Eat slowly. Eat fruits and vegetables at every meal.  They are low in calories and high in fiber, which makes you feel full. Do not add butter, margarine, or cream sauce to vegetables. Use herbs to season steamed vegetables. Eat less fat and fewer fried foods. Eat more baked or grilled chicken and fish. These protein sources are lower in calories and fat than red meat. Limit fast food. Dress your salads with olive oil and vinegar instead of bottled dressing.      Limit the amount of sugar you eat. Do not drink sugary beverages. Limit alcohol. Activity changes:  Physical activity is good for your body in many ways. It helps you burn calories and build strong muscles. It decreases stress and depression, and improves your mood. It can also help you sleep better. Talk to your healthcare provider before you begin an exercise program.  Exercise for at least 30 minutes 5 days a week. Start slowly. Set aside time each day for physical activity that you enjoy and that is convenient for you. It is best to do both weight training and an activity that increases your heart rate, such as walking, bicycling, or swimming. Find ways to be more active. Do yard work and housecleaning. Walk up the stairs instead of using elevators. Spend your leisure time going to events that require walking, such as outdoor festivals or fairs. This extra physical activity can help you lose weight and keep it off. Follow up with your primary healthcare provider as directed. You may need to meet with a dietitian. Write down your questions so you remember to ask them during your visits.

## 2023-10-11 NOTE — PROGRESS NOTES
PT given first HPV vaccine today in the left arm    Sat for 15 mins no reaction     NDC#:4475-2427-66  MPF#:[id number]  Exp date: 10/5/2025

## 2023-10-11 NOTE — PROGRESS NOTES
ANNUAL GYNECOLOGICAL EXAMINATION    Fransisca Angela is a 26 y.o. female who presents today for annual GYN exam.  She has never had a pap smear previously. She reports menses as regular. Patient's last menstrual period was 10/06/2023 (exact date). Her general medical history has been reviewed and she reports it as follows:    Past Medical History:   Diagnosis Date    No known health problems      Past Surgical History:   Procedure Laterality Date    NO PAST SURGERIES       OB History          0    Para   0    Term   0       0    AB   0    Living   0         SAB   0    IAB   0    Ectopic   0    Multiple   0    Live Births   0               Social History     Tobacco Use    Smoking status: Never     Passive exposure: Never    Smokeless tobacco: Never   Vaping Use    Vaping Use: Never used   Substance Use Topics    Alcohol use: Yes     Comment: couple times/year    Drug use: Never     Social History     Substance and Sexual Activity   Sexual Activity Never    Partners: Male    Birth control/protection: None     Cancer-related family history is negative for Breast cancer, Colon cancer, Ovarian cancer, and Cancer. Current Outpatient Medications   Medication Instructions    acetaminophen (TYLENOL) 650 mg, Oral, Every 8 hours PRN    azelastine (ASTELIN) 0.1 % nasal spray 1 spray, Nasal, 2 times daily, Use in each nostril as directed    fexofenadine (ALLEGRA) 180 mg, Oral, Daily    ibuprofen (MOTRIN) 600 mg, Oral, Every 6 hours PRN    omeprazole (PRILOSEC) 40 mg, Oral, Daily    sodium chloride (OCEAN) 0.65 % nasal spray 1 spray, Nasal, As needed       Review of Systems:  Review of Systems   Constitutional: Negative. Gastrointestinal: Negative. Genitourinary:  Negative for difficulty urinating, menstrual problem, pelvic pain and vaginal discharge. Skin: Negative.         Physical Exam:  /66   Pulse 88   Wt 116 kg (256 lb 3.2 oz)   LMP 10/06/2023 (Exact Date)   BMI 40.13 kg/m²   Physical Exam  Constitutional:       General: She is not in acute distress. Appearance: She is well-developed. Genitourinary:      Vulva normal.      No lesions in the vagina. Right Adnexa: not tender and no mass present. Left Adnexa: not tender and no mass present. No cervical motion tenderness or lesion. Uterus is not tender. Breasts:     Right: No mass, nipple discharge, skin change or tenderness. Left: No mass, nipple discharge, skin change or tenderness. Neck:      Thyroid: No thyromegaly. Cardiovascular:      Rate and Rhythm: Normal rate and regular rhythm. Pulmonary:      Effort: Pulmonary effort is normal.   Abdominal:      Palpations: Abdomen is soft. Tenderness: There is no abdominal tenderness. Musculoskeletal:      Cervical back: Neck supple. Neurological:      Mental Status: She is alert and oriented to person, place, and time. Skin:     General: Skin is warm and dry. Vitals reviewed. Assessment/Plan:   1. Normal well-woman GYN exam.  2. Cervical cancer screening:  Normal cervical exam.  Pap smear done with HPV reflex. Has received HPV vaccine x1 dose in 2015, and desires to re-initiate series now. Given HPV vaccine today and will return in 2 and 6 months for subsequent vaccinations. 3. STD screening:  Orders placed for vaginal GC/CT cultures. 4. Breast cancer screening:  Normal breast exam.  Reviewed breast self-awareness. 5. Depression Screening: Patient's depression screening was assessed with a PHQ-2 score of 2. Their PHQ-9 score was 9. Clinically patient does not have depression. No treatment is required. 6. BMI Counseling: Body mass index is 40.13 kg/m². Discussed the patient's BMI with her. The BMI is above normal. Nutrition recommendations include reducing portion sizes and decreasing overall calorie intake. 7. Contraception:  Declines.    8. Return to office in 1 year for annual GYN exam.

## 2023-10-11 NOTE — LETTER
10/19/2023    To Fransisca Angela  : 2000      This letter is to advise you that your recent PAP SMEAR results were reviewed by me and are NORMAL.   We will see you in 1 year for your annual exam.    Angel Luis Jaimes

## 2023-10-13 LAB
C TRACH DNA SPEC QL NAA+PROBE: NEGATIVE
N GONORRHOEA DNA SPEC QL NAA+PROBE: NEGATIVE

## 2023-10-19 LAB
LAB AP GYN PRIMARY INTERPRETATION: NORMAL
Lab: NORMAL

## 2023-10-25 ENCOUNTER — TELEPHONE (OUTPATIENT)
Dept: PLASTIC SURGERY | Facility: CLINIC | Age: 23
End: 2023-10-25

## 2023-10-25 DIAGNOSIS — M54.50 CHRONIC MIDLINE LOW BACK PAIN WITHOUT SCIATICA: Primary | ICD-10-CM

## 2023-10-25 DIAGNOSIS — G89.29 CHRONIC MIDLINE LOW BACK PAIN WITHOUT SCIATICA: Primary | ICD-10-CM

## 2023-10-25 NOTE — TELEPHONE ENCOUNTER
Emailed patient and asked her to advise when one more month of physical therapy is complete so we can make breast reduction consultation.

## 2023-11-02 ENCOUNTER — EVALUATION (OUTPATIENT)
Dept: PHYSICAL THERAPY | Facility: CLINIC | Age: 23
End: 2023-11-02
Payer: COMMERCIAL

## 2023-11-02 VITALS — SYSTOLIC BLOOD PRESSURE: 108 MMHG | DIASTOLIC BLOOD PRESSURE: 68 MMHG

## 2023-11-02 DIAGNOSIS — G89.29 CHRONIC MIDLINE LOW BACK PAIN WITHOUT SCIATICA: ICD-10-CM

## 2023-11-02 DIAGNOSIS — M54.50 CHRONIC MIDLINE LOW BACK PAIN WITHOUT SCIATICA: ICD-10-CM

## 2023-11-02 PROCEDURE — 97530 THERAPEUTIC ACTIVITIES: CPT

## 2023-11-02 PROCEDURE — 97161 PT EVAL LOW COMPLEX 20 MIN: CPT

## 2023-11-02 PROCEDURE — 97110 THERAPEUTIC EXERCISES: CPT

## 2023-11-02 NOTE — PROGRESS NOTES
PT Evaluation     Today's date: 2023  Patient name: Franny Ramirez  : 2000  MRN: [id number]  Referring provider: Jaime Sinha, *  Dx:   Encounter Diagnosis     ICD-10-CM    1. Chronic midline low back pain without sciatica  M54.50 Ambulatory Referral to Physical Therapy    G89.29                      Assessment  Assessment details: Fransisca Angela  is a pleasant 26 y.o. female who presents with decreased posterior chain activation, thoracic spine mobility deficits, and muscle restrictions throughout the cervical/thoracic region . The primary movement problem is decreased thoracic mobility/ increased stiffness in the spine as well as decreased posterior muscle restrictions resulting in postural deficits and mobility deficits , which limit her ability to sit, stand and bend for prolonged periods without increased pain or compensation. No referral is necessary at this time based on examination results. Problem List:  1) Decreased thoracic mobility   2) Decreased STM in the cervical/ thoracic region   3)     Tolerated session without adverse effects. Pt. will benefit from skilled PT services that includes manual therapy techniques to enhance tissue extensibility, neuromuscular re-education to facilitate motor control, therapeutic exercise to increase functional mobility, and modalities prn to reduce pain and inflammation. Access Code: [email or web address]  URL: [email or web address]  Date: 2023  Prepared by: Claudia Miller    Exercises  - Sidelying Open Book Thoracic Rotation with Knee on Foam Roll  - 1 x daily - 7 x weekly - 5 reps - 10 hold  - Seated Upper Trapezius Stretch  - 1 x daily - 7 x weekly - 3 reps - 20 hold  - Seated Cervical Retraction  - 1 x daily - 7 x weekly - 10 reps - 3 hold  - Seated Thoracic Lumbar Extension with Pectoralis Stretch  - 1 x daily - 7 x weekly - 10 reps - 3 hold  - Shoulder External Rotation and Scapular Retraction with Resistance  - 1 x daily - 7 x weekly - 2 sets - 10 reps  Impairments: impaired balance, impaired physical strength and pain with function    Symptom irritability: moderateUnderstanding of Dx/Px/POC: good   Prognosis: good    Goals  Short Term Goals: to be achieved by 4 weeks  1) Patient to be independent with basic HEP. 2) Decrease pain to 3/10 at its worst.  3) Increase lumbar spine ROM by 50% in all deficient planes. 4) Increase LE strength by 1/2 MMT grade in all deficient planes. 5) Patient to report decreased sleep interruption secondary to pain. 6) Increase seated and ambulatory to tolerance  30 min without increased pain. Long Term Goals: to be achieved by discharge  1) FOTO equal to or greater than . 2) Patient to be independent with comprehensive HEP. 3) Abolish pain for improved quality of life. 4) Lumbar spine ROM WNL all planes to improve a/iadls. 5) Increase LE strength to 5/5 MMT grade in all planes to improve a/iadls. 6) Patient to report no sleep interruption secondary to pain. 7) Increase seated/ bending and ambulatory tolerance to 60 mins without pain in 6-8 weeks     Plan  Patient would benefit from: PT eval and skilled physical therapy  Referral necessary: No  Planned modality interventions: cryotherapy, thermotherapy: hydrocollator packs and TENS  Planned therapy interventions: IASTM, joint mobilization, kinesiology taping, manual therapy, neuromuscular re-education, patient education, postural training, strengthening, stretching, therapeutic activities, therapeutic exercise, home exercise program and coordination  Frequency: 2x week  Duration in weeks: 8  Plan of Care beginning date: 11/2/2023  Plan of Care expiration date: 12/28/2023  Treatment plan discussed with: patient        Subjective Evaluation    History of Present Illness  Mechanism of injury: Fransisca Angela presents with c/c of thoracic/ mid spine pain. Symptoms began over 6 months ago with no known mechanism of injury.  Notes she had prior PT which temporarily helped but pain came back. Denies numbness/ tingling in the UE/ LE. [place] Reining MUÑIZ occurring daily correlated with back pain. 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 Negative for night pain, disturbed sleep, dizziness, faints, nausea/motion sickness, bladder dysfunction, bowel dysfunction and saddle (S4) numbness    Palpation     Additional Palpation Details  Increased spasming along the B paraspinals and QL lumbar into the thoracic spine B   UT/ levator tenderness noted B increased palpable spasming   SOR restrictions/ tenderness B with provocation of HA  QL tenderness B at lumbar attachment     Neurological Testing     Sensation   Cervical/Thoracic   Left   Intact: light touch    Right   Intact: light touch    Comments   Left light touch: C3-T1. Right light touch: C3-T1. Active Range of Motion   Cervical/Thoracic Spine       Thoracic    Flexion: Active thoracic flexion: tension/ pain in the thoracic spine. with pain Restriction level: maximal  Extension: Active thoracic extension: increased stiffness.      with pain Restriction level: maximal  Left lateral flexion:  with pain Restriction level: moderate  Right lateral flexion:  with pain Restriction level: moderate    Lumbar   Flexion:  Restriction level: minimal  Extension: Active lumbar extension: increased pressure in T/ L jxn.  with pain Restriction level: moderate  Left lateral flexion:  Restriction level: minimal  Right lateral flexion:  Restriction level: minimal  Left rotation:  with pain Restriction level: minimal  Right rotation:  with pain Restriction level: minimal    Additional Active Range of Motion Details  Increased stiffness throughout the spine/ mild shoulder pain noted   T/l jxn pressure with lumbar movement     Joint Play   Joints within functional limits: T7 and T8     Hypermobile: C3, C4, C5, C6, C7, L1, L2, L3 and L4     Hypomobile: T1, T2, T3, T4, T5, T6, T9, T10, T11, L5 and S1     Pain: T9, T10, T11 and T12     Strength/Myotome Testing   Cervical Spine     Left   Levator scapulae (C4): 4-    Right   Levator scapulae (C4): 4-    Left Shoulder     Planes of Motion   Flexion: 4+   Abduction: 4   External rotation at 0°: 4   Internal rotation at 0°: 4+     Isolated Muscles   Levator scapulae: 4-   Lower trapezius: 3   Middle trapezius: 3+   Serratus anterior: 4-   Upper trapezius: 4-     Right Shoulder     Planes of Motion   Flexion: 4+   Abduction: 4   External rotation at 0°: 4   Internal rotation at 0°: 4+     Isolated Muscles   Levator scapulae: 4-   Lower trapezius: 3   Middle trapezius: 3+   Serratus anterior: 4-   Upper trapezius: 4-     Left Elbow   Flexion: 5  Extension: 5    Right Elbow   Flexion: 5  Extension: 5  Neuro Exam:     Headaches   Patient reports headaches: Yes.               Precautions: n/a      Manuals 11/2            Thoracic PA              Cervical SOR/ MFR UT              Thoracic parapsinal/ QL paraspinal                           Neuro Re-Ed 11/2            Thoracic ext ( HEP) 3" x 10             Supine foam roll protocol              Chin tucks (HEP) 3" x 10             No moneys (HEP) GTB 2 x 10             [place] rows/ ext              Ys/ Ts/ Is                           Ther Ex 11/2            UT stretch (HEP) 3x20"             Open book stretch (HEP) 5x10"             Thoracic ext/ rot stretch                                                                               Ther Activity 11/2 Patient education  Sky Roth                                       Patient education              Modalities 11/2            HP prn

## 2023-11-03 ENCOUNTER — OFFICE VISIT (OUTPATIENT)
Dept: PHYSICAL THERAPY | Facility: CLINIC | Age: 23
End: 2023-11-03
Payer: COMMERCIAL

## 2023-11-03 DIAGNOSIS — M54.50 CHRONIC MIDLINE LOW BACK PAIN WITHOUT SCIATICA: Primary | ICD-10-CM

## 2023-11-03 DIAGNOSIS — G89.29 CHRONIC MIDLINE LOW BACK PAIN WITHOUT SCIATICA: Primary | ICD-10-CM

## 2023-11-03 PROCEDURE — 97112 NEUROMUSCULAR REEDUCATION: CPT

## 2023-11-03 PROCEDURE — 97110 THERAPEUTIC EXERCISES: CPT

## 2023-11-03 PROCEDURE — 97140 MANUAL THERAPY 1/> REGIONS: CPT

## 2023-11-03 NOTE — PROGRESS NOTES
Daily Note     Today's date: 11/3/2023  Patient name: Mirta Cardoza  : 2000  MRN: [id number]  Referring provider: Scott Pino, *  Dx:   Encounter Diagnosis     ICD-10-CM    1. Chronic midline low back pain without sciatica  M54.50     G89.29                      Subjective: " I am feeling ok today"       Objective: See treatment diary below      Assessment: Fransisca presents to therapy with thoracic and lumbar pain. Performed MFR and STM to tolerance throughout noting increased spamsing and mobility deficits throughout. Mild provocation of soreness noted. Heavy cuing for increased scap activation throughout exercises. Tolerated session without adverse effects. Recommend continued skilled therapy to improve overall strength and mobility for functional return with decreased compensation and pain. Plan: Continue per plan of care.       Precautions: n/a      Manuals 11/2 11/3           Thoracic PA   KR grade IV 3 x 30"            Cervical SOR/ MFR UT   KR            Thoracic parapsinal/ QL paraspinal   KR to amrita                         Neuro Re-Ed 11/2 11/3           Thoracic ext ( HEP) 3" x 10  3" x 10            Supine foam roll protocol   W/ towel x 15 ea            Chin tucks (HEP) 3" x 10             No moneys (HEP) GTB 2 x 10  GTB 2 x 10            [place] rows/ ext   13#/ 10# 2 x 10            Ys/ Ts/ Is   nv           Reverse flys   GTB x 10            Ther Ex 11/2 11/3           UT stretch (HEP) 3x20"             Open book stretch (HEP) 5x10"  5x10"            Thoracic ext/ rot stretch              Doorway pec stretch   5x10"                                                                Ther Activity 11/2 11/3           Patient education  KR            UBE  5' post                                     FOTO             Modalities 11/2 11/3           HP prn

## 2023-11-13 ENCOUNTER — OFFICE VISIT (OUTPATIENT)
Dept: PHYSICAL THERAPY | Facility: CLINIC | Age: 23
End: 2023-11-13
Payer: COMMERCIAL

## 2023-11-13 DIAGNOSIS — G89.29 CHRONIC MIDLINE LOW BACK PAIN WITHOUT SCIATICA: Primary | ICD-10-CM

## 2023-11-13 DIAGNOSIS — M54.50 CHRONIC MIDLINE LOW BACK PAIN WITHOUT SCIATICA: Primary | ICD-10-CM

## 2023-11-13 PROCEDURE — 97112 NEUROMUSCULAR REEDUCATION: CPT

## 2023-11-13 PROCEDURE — 97140 MANUAL THERAPY 1/> REGIONS: CPT

## 2023-11-13 NOTE — PROGRESS NOTES
Daily Note     Today's date: 2023  Patient name: Rosendo Davila  : 2000  MRN: [id number]  Referring provider: Lawernce Hodgkin, *  Dx:   Encounter Diagnosis     ICD-10-CM    1. Chronic midline low back pain without sciatica  M54.50     G89.29                        Subjective: Pt presents to PT reporting "a little" pain and stiffness in middle of back. Pt reports feeling "a little looser" after session. Objective: See treatment diary below      Assessment:  Pt demonstrates good tolerance to session. Performed MFR and STM to tolerance noting less restriction in R UT. Mild provocation of soreness noted. Performed frequent cuing for scap retraction. Tolerated session without adverse effects. Recommend continued skilled therapy to improve overall strength and mobility for functional return with decreased compensation and pain. Plan: Continue per plan of care.       Precautions: n/a      Manuals 11/2 11/3 11/13          Thoracic PA   KR grade IV 3 x 30"  KR grade IV 3 x 30"           Cervical SOR/ MFR UT   KR  PK          Thoracic parapsinal/ QL paraspinal   KR to amrita  np                       Neuro Re-Ed 11/2 11/3 11/13          Thoracic ext ( HEP) 3" x 10  3" x 10  3" x 10           Supine foam roll protocol   W/ towel x 15 ea  nv          Chin tucks (HEP) 3" x 10   3" x 10           No moneys (HEP) GTB 2 x 10  GTB 2 x 10  GTB 2 x 10           [place] rows/ ext   13#/ 10# 2 x 10  13#/ 10# 2 x 10           Ys/ Ts/ Is   nv 10x ea          Reverse flys   GTB x 10  GTB 10x           Ther Ex 11/2 11/3 11/13          UT stretch (HEP) 3x20"             Open book stretch (HEP) 5x10"  5x10"  nv          Thoracic ext/ rot stretch              Doorway pec stretch   5x10"  nv                                                              Ther Activity 11/2 11/3 11/13          Patient education  KR  PK          UBE  5' post 5' retro                                    FOTO             Modalities  11/3 11/13          HP prn

## 2023-11-17 ENCOUNTER — OFFICE VISIT (OUTPATIENT)
Dept: PHYSICAL THERAPY | Facility: CLINIC | Age: 23
End: 2023-11-17
Payer: COMMERCIAL

## 2023-11-17 DIAGNOSIS — M54.50 CHRONIC MIDLINE LOW BACK PAIN WITHOUT SCIATICA: Primary | ICD-10-CM

## 2023-11-17 DIAGNOSIS — G89.29 CHRONIC MIDLINE LOW BACK PAIN WITHOUT SCIATICA: Primary | ICD-10-CM

## 2023-11-17 PROCEDURE — 97112 NEUROMUSCULAR REEDUCATION: CPT

## 2023-11-17 PROCEDURE — 97140 MANUAL THERAPY 1/> REGIONS: CPT

## 2023-11-17 PROCEDURE — 97110 THERAPEUTIC EXERCISES: CPT

## 2023-11-17 NOTE — PROGRESS NOTES
Daily Note     Today's date: 2023  Patient name: Myke Jerez  : 2000  MRN: [id number]  Referring provider: Derrick Crowell, *  Dx:   Encounter Diagnosis     ICD-10-CM    1. Chronic midline low back pain without sciatica  M54.50     G89.29                          Subjective: " I have some relief when I leave here" " Coming in I feel stiff"       Objective: See treatment diary below      Assessment:  Fransisca presents with thoracic pain. Noted continued restrictions throughout the thoracic region with increased palpable restrictions noted throughout. Focused on increased postural and posterior chain activation exercises today- improved lower/ middle trap activation. Tolerated session without adverse effects. Recommend continued skilled therapy to improve overall strength and mobility for functional return with decreased compensation and pain. Plan: Continue per plan of care.       Precautions: n/a      Manuals 11/2 11/3 11/13 11/17         Thoracic PA   KR grade IV 3 x 30"  KR grade IV 3 x 30"  KR grade IV 3 x 30"          Cervical SOR/ MFR UT   KR  PK KR         Thoracic parapsinal/ QL paraspinal   KR to amrita  np KR                      Neuro Re-Ed 11/2 11/3 11/13 11/17         Thoracic ext ( HEP) 3" x 10  3" x 10  3" x 10           Supine foam roll protocol   W/ towel x 15 ea  nv X15 ea          Chin tucks (HEP) 3" x 10   3" x 10           No moneys (HEP) GTB 2 x 10  GTB 2 x 10  GTB 2 x 10  BTB 2 x 10         [place] rows/ ext   13#/ 10# 2 x 10  13#/ 10# 2 x 10  14#/ 11# 3 x 10          Ys/ Ts/ Is   nv 10x ea 2x10 B          Reverse flys   GTB x 10  GTB 10x  BTB x 10          Ther Ex 11/2 11/3 11/13 11/17         UT stretch (HEP) 3x20"             Open book stretch (HEP) 5x10"  5x10"  nv          Thoracic ext/ rot stretch              Doorway pec stretch   5x10"  nv Supine 3x20"          Pball flex stretch     10x10"                                                 Ther Activity 11/2 11/3 11/13 11/17         Patient education  KR  PK          UBE  5' post 5' retro 5' retro                                    FOTO             Modalities 11/2 11/3 11/13 11/17         HP prn

## 2023-11-24 ENCOUNTER — OFFICE VISIT (OUTPATIENT)
Dept: PHYSICAL THERAPY | Facility: CLINIC | Age: 23
End: 2023-11-24
Payer: COMMERCIAL

## 2023-11-24 DIAGNOSIS — M54.50 CHRONIC MIDLINE LOW BACK PAIN WITHOUT SCIATICA: Primary | ICD-10-CM

## 2023-11-24 DIAGNOSIS — G89.29 CHRONIC MIDLINE LOW BACK PAIN WITHOUT SCIATICA: Primary | ICD-10-CM

## 2023-11-24 PROCEDURE — 97140 MANUAL THERAPY 1/> REGIONS: CPT

## 2023-11-24 PROCEDURE — 97110 THERAPEUTIC EXERCISES: CPT

## 2023-11-24 PROCEDURE — 97112 NEUROMUSCULAR REEDUCATION: CPT

## 2023-11-24 NOTE — PROGRESS NOTES
Daily Note     Today's date: 2023  Patient name: Mckenzie Rubalcava  : 2000  MRN: [id number]  Referring provider: Abi Ocampo, *  Dx:   Encounter Diagnosis     ICD-10-CM    1. Chronic midline low back pain without sciatica  M54.50     G89.29                            Subjective: " My back has been much worse this week even when I lay down"       Objective: See treatment diary below      Assessment:  Fransisca presents with thoracic pain. Note increased restrictions in the posterior spine today. Attempted to add LTR and Juliet Contreras pose to reduce restrictions in the mid back- noted no stretching. Noted increased restrictions with rotation mobility in the thoracic spine. Tolerated session without adverse effects. Recommend continued skilled therapy to improve overall strength and mobility for functional return with decreased compensation and pain. Plan: Continue per plan of care.       Precautions: n/a      Manuals 11/2 11/3 11/13 11/17 11/24        Thoracic PA   KR grade IV 3 x 30"  KR grade IV 3 x 30"  KR grade IV 3 x 30"  KR grade IV 3 x 30"         Cervical SOR/ MFR UT   KR  PK KR KR        Thoracic parapsinal/ QL paraspinal   KR to amrita  np KR KR                     Neuro Re-Ed 11/2 11/3 11/13 11/17 11/24        Thoracic ext ( HEP) 3" x 10  3" x 10  3" x 10   3" x 10         Supine foam roll protocol   W/ towel x 15 ea  nv X15 ea  X15 ea         Chin tucks (HEP) 3" x 10   3" x 10           No moneys (HEP) GTB 2 x 10  GTB 2 x 10  GTB 2 x 10  BTB 2 x 10 BTB 2 x 10         [place] rows/ ext   13#/ 10# 2 x 10  13#/ 10# 2 x 10  14#/ 11# 3 x 10  14#/ 11# x25        Ys/ Ts/ Is   nv 10x ea 2x10 B          Reverse flys   GTB x 10  GTB 10x  BTB x 10          Ther Ex 11/2 11/3 11/13 11/17 11/24        UT stretch (HEP) 3x20"             Open book stretch (HEP) 5x10"  5x10"  nv  5x10"         Thoracic ext/ rot stretch      5x10" B         Doorway pec stretch   5x10"  nv Supine 3x20"          Pball flex stretch     10x10"  10x10"         QL stretc h     5x10" B                                  Ther Activity 11/2 11/3 11/13 11/17 11/24        Patient education  KR  PK          UBE  5' post 5' retro 5' retro                                    FOTO     X        Modalities 11/2 11/3 11/13 11/17 11/24        HP prn

## 2023-11-27 ENCOUNTER — OFFICE VISIT (OUTPATIENT)
Dept: PHYSICAL THERAPY | Facility: CLINIC | Age: 23
End: 2023-11-27
Payer: COMMERCIAL

## 2023-11-27 DIAGNOSIS — M54.50 CHRONIC MIDLINE LOW BACK PAIN WITHOUT SCIATICA: Primary | ICD-10-CM

## 2023-11-27 DIAGNOSIS — G89.29 CHRONIC MIDLINE LOW BACK PAIN WITHOUT SCIATICA: Primary | ICD-10-CM

## 2023-11-27 PROCEDURE — 97140 MANUAL THERAPY 1/> REGIONS: CPT

## 2023-11-27 PROCEDURE — 97112 NEUROMUSCULAR REEDUCATION: CPT

## 2023-11-27 PROCEDURE — 97110 THERAPEUTIC EXERCISES: CPT

## 2023-11-27 NOTE — PROGRESS NOTES
Daily Note     Today's date: 2023  Patient name: Juany Fam  : 2000  MRN: [id number]  Referring provider: Ebony Chan, *  Dx:   Encounter Diagnosis     ICD-10-CM    1. Chronic midline low back pain without sciatica  M54.50     G89.29                              Subjective: " I felt better after last time- I feel ok today"       Objective: See treatment diary below      Assessment:  Fransisca presents with thoracic pain. Noted increased restrictions in the R > L cervical region today- mild improvement post manual release with slight provocation in HA. Noted improved mobility throughout the thoracic spine. Continued to progress scap activation to tolerance. Tolerated session without adverse effects. Recommend continued skilled therapy to improve overall strength and mobility for functional return with decreased compensation and pain. Plan: Continue per plan of care.       Precautions: n/a      Manuals 11/2 11/3 11/13 11/17 11/24 11/27       Thoracic PA   KR grade IV 3 x 30"  KR grade IV 3 x 30"  KR grade IV 3 x 30"  KR grade IV 3 x 30"  KR grade IV 3 x 30"        Cervical SOR/ MFR UT   KR  PK KR KR KR       Thoracic parapsinal/ QL paraspinal   KR to amrita  np KR KR KR                    Neuro Re-Ed 11/2 11/3 11/13 11/17 11/24 11/27       Thoracic ext ( HEP) 3" x 10  3" x 10  3" x 10   3" x 10  3" x 10        Supine foam roll protocol   W/ towel x 15 ea  nv X15 ea  X15 ea  X15 ea        [place] high to low row       13# 2 x 10        Chin tucks (HEP) 3" x 10   3" x 10           No moneys (HEP) GTB 2 x 10  GTB 2 x 10  GTB 2 x 10  BTB 2 x 10 BTB 2 x 10  BTB x 27        [place] rows/ ext   13#/ 10# 2 x 10  13#/ 10# 2 x 10  14#/ 11# 3 x 10  14#/ 11# x25 15#/ 12# 2 x 10        Wall push ups              Ys/ Ts/ Is   nv 10x ea 2x10 B   2x10 B        Reverse flys   GTB x 10  GTB 10x  BTB x 10   BTB x 10        Ther Ex 11/2 11/3 11/13 11/17 11/24 11/27       UT stretch (HEP) 3x20" Open book stretch (HEP) 5x10"  5x10"  nv  5x10"  3x10" B        Thoracic ext/ rot stretch      5x10" B  5x10" B        Doorway pec stretch   5x10"  nv Supine 3x20"          Pball flex stretch     10x10"  10x10"  nv       QL stretc h     5x10" B                                  Ther Activity 11/2 11/3 11/13 11/17 11/24 11/27       Patient education  KR  PK          UBE  5' post 5' retro 5' retro   5' retro                                  FOTO     X        Modalities 11/2 11/3 11/13 11/17 11/24 11/27       HP prn

## 2023-12-01 ENCOUNTER — APPOINTMENT (OUTPATIENT)
Dept: PHYSICAL THERAPY | Facility: CLINIC | Age: 23
End: 2023-12-01
Payer: COMMERCIAL

## 2023-12-01 NOTE — PROGRESS NOTES
"Daily Note     Today's date: 2023  Patient name: Fransisca Angela  : 2000  MRN: [id number]  Referring provider: Pat Spicer, *  Dx:   No diagnosis found.                         Subjective: \" I      Objective: See treatment diary below      Assessment:  Fransisca presents with thoracic pain.   Tolerated session without adverse effects.  Recommend continued skilled therapy to improve overall strength and mobility for functional return with decreased compensation and pain.        Plan: Continue per plan of care.      Precautions: n/a      Manuals 11/2 11/3 11/13 11/17 11/24 11/27 12/1      Thoracic PA   KR grade IV 3 x 30\"  KR grade IV 3 x 30\"  KR grade IV 3 x 30\"  KR grade IV 3 x 30\"  KR grade IV 3 x 30\"        Cervical SOR/ MFR UT   KR  PK KR KR KR       Thoracic parapsinal/ QL paraspinal   KR to amrita  np KR KR KR                    Neuro Re-Ed 11/2 11/3 11/13 11/17 11/24 11/27 12/1      Thoracic ext ( HEP) 3\" x 10  3\" x 10  3\" x 10   3\" x 10  3\" x 10        Supine foam roll protocol   W/ towel x 15 ea  nv X15 ea  X15 ea  X15 ea        [place] high to low row       13# 2 x 10        Chin tucks (HEP) 3\" x 10   3\" x 10           No moneys (HEP) GTB 2 x 10  GTB 2 x 10  GTB 2 x 10  BTB 2 x 10 BTB 2 x 10  BTB x 27        [place] rows/ ext   13#/ 10# 2 x 10  13#/ 10# 2 x 10  14#/ 11# 3 x 10  14#/ 11# x25 15#/ 12# 2 x 10        Wall push ups              Ys/ Ts/ Is   nv 10x ea 2x10 B   2x10 B        Reverse flys   GTB x 10  GTB 10x  BTB x 10   BTB x 10        Ther Ex 11/2 11/3 11/13 11/17 11/24 11/27 12/1      UT stretch (HEP) 3x20\"             Open book stretch (HEP) 5x10\"  5x10\"  nv  5x10\"  3x10\" B        Thoracic ext/ rot stretch      5x10\" B  5x10\" B        Doorway pec stretch   5x10\"  nv Supine 3x20\"          Pball flex stretch     10x10\"  10x10\"  nv       QL stretc h     5x10\" B                                  Ther Activity 11/2 11/3 11/13 11/17 11/24 11/27 12/1      Patient education  KR  PK        "   UBE  5' post 5' retro 5' retro   5' retro                                  FOTO     X        Modalities 11/2 11/3 11/13 11/17 11/24 11/27 12/1      HP prn

## 2023-12-04 ENCOUNTER — OFFICE VISIT (OUTPATIENT)
Dept: PHYSICAL THERAPY | Facility: CLINIC | Age: 23
End: 2023-12-04
Payer: COMMERCIAL

## 2023-12-04 DIAGNOSIS — M54.50 CHRONIC MIDLINE LOW BACK PAIN WITHOUT SCIATICA: Primary | ICD-10-CM

## 2023-12-04 DIAGNOSIS — G89.29 CHRONIC MIDLINE LOW BACK PAIN WITHOUT SCIATICA: Primary | ICD-10-CM

## 2023-12-04 PROCEDURE — 97530 THERAPEUTIC ACTIVITIES: CPT

## 2023-12-04 PROCEDURE — 97110 THERAPEUTIC EXERCISES: CPT

## 2023-12-04 PROCEDURE — 97112 NEUROMUSCULAR REEDUCATION: CPT

## 2023-12-04 NOTE — PROGRESS NOTES
Daily Note     Today's date: 2023  Patient name: Zeb Flanagan  : 2000  MRN: [id number]  Referring provider: Ramon Spurling, *  Dx:   Encounter Diagnosis     ICD-10-CM    1. Chronic midline low back pain without sciatica  M54.50     G89.29                                Subjective: " My back is pretty sore today"       Objective: See treatment diary below      Assessment:  Fransisca presents with thoracic pain. Noted increased restrictions upon arrival  today with palpable restrictions noted throughout the B paraspinals/ QL. Mild relief post release. Focused on increased mobility today to tolerance. eTolerated session without adverse effects. Recommend continued skilled therapy to improve overall strength and mobility for functional return with decreased compensation and pain. Plan: Continue per plan of care.       Precautions: n/a      Manuals 11/2 11/3 11/13 11/17 11/24 11/27 12/4      Thoracic PA   KR grade IV 3 x 30"  KR grade IV 3 x 30"  KR grade IV 3 x 30"  KR grade IV 3 x 30"  KR grade IV 3 x 30"  KR grade IV 3 x 30"       Cervical SOR/ MFR UT   KR  PK KR KR KR KR      Thoracic parapsinal/ QL paraspinal   KR to amrita  np KR KR KR KR                   Neuro Re-Ed 11/2 11/3 11/13 11/17 11/24 11/27 12/4      Thoracic ext ( HEP) 3" x 10  3" x 10  3" x 10   3" x 10  3" x 10        Supine foam roll protocol   W/ towel x 15 ea  nv X15 ea  X15 ea  X15 ea  X20 ea       [place] high to low row       13# 2 x 10  13# 2 x 10       Chin tucks (HEP) 3" x 10   3" x 10           No moneys (HEP) GTB 2 x 10  GTB 2 x 10  GTB 2 x 10  BTB 2 x 10 BTB 2 x 10  BTB x 27  BTB x 30       [place] rows/ ext   13#/ 10# 2 x 10  13#/ 10# 2 x 10  14#/ 11# 3 x 10  14#/ 11# x25 15#/ 12# 2 x 10  15#/ 12# x25      Wall push ups              Ys/ Ts/ Is   nv 10x ea 2x10 B   2x10 B  nv      Reverse flys   GTB x 10  GTB 10x  BTB x 10   BTB x 10        Ther Ex 11/2 11/3 11/13 11/17 11/24 11/27 12/4      UT stretch (HEP) 3x20" Open book stretch (HEP) 5x10"  5x10"  nv  5x10"  3x10" B  D/c       Thoracic ext/ rot stretch      5x10" B  5x10" B  5x10" B      Doorway pec stretch   5x10"  nv Supine 3x20"          Pball flex stretch     10x10"  10x10"  nv 10x10"       QL stretc h     5x10" B  5x10" B                                 Ther Activity 11/2 11/3 11/13 11/17 11/24 11/27 12/4      Patient education  KR  PK          UBE  5' post 5' retro 5' retro   5' retro  5' retro                                 FOTO     X        Modalities 11/2 11/3 11/13 11/17 11/24 11/27 12/4      HP prn

## 2023-12-08 ENCOUNTER — OFFICE VISIT (OUTPATIENT)
Dept: PHYSICAL THERAPY | Facility: CLINIC | Age: 23
End: 2023-12-08
Payer: COMMERCIAL

## 2023-12-08 ENCOUNTER — OFFICE VISIT (OUTPATIENT)
Dept: FAMILY MEDICINE CLINIC | Facility: CLINIC | Age: 23
End: 2023-12-08

## 2023-12-08 VITALS
BODY MASS INDEX: 41.67 KG/M2 | TEMPERATURE: 97.9 F | WEIGHT: 265.5 LBS | RESPIRATION RATE: 18 BRPM | DIASTOLIC BLOOD PRESSURE: 70 MMHG | OXYGEN SATURATION: 99 % | SYSTOLIC BLOOD PRESSURE: 114 MMHG | HEIGHT: 67 IN | HEART RATE: 80 BPM

## 2023-12-08 DIAGNOSIS — M54.2 NECK PAIN: ICD-10-CM

## 2023-12-08 DIAGNOSIS — G89.29 CHRONIC BILATERAL THORACIC BACK PAIN: ICD-10-CM

## 2023-12-08 DIAGNOSIS — Z23 ENCOUNTER FOR IMMUNIZATION: Primary | ICD-10-CM

## 2023-12-08 DIAGNOSIS — N62 MACROMASTIA: ICD-10-CM

## 2023-12-08 DIAGNOSIS — M54.6 CHRONIC BILATERAL THORACIC BACK PAIN: ICD-10-CM

## 2023-12-08 DIAGNOSIS — M54.50 CHRONIC MIDLINE LOW BACK PAIN WITHOUT SCIATICA: Primary | ICD-10-CM

## 2023-12-08 DIAGNOSIS — G89.29 CHRONIC MIDLINE LOW BACK PAIN WITHOUT SCIATICA: Primary | ICD-10-CM

## 2023-12-08 DIAGNOSIS — E66.01 CLASS 3 SEVERE OBESITY DUE TO EXCESS CALORIES WITH BODY MASS INDEX (BMI) OF 40.0 TO 44.9 IN ADULT, UNSPECIFIED WHETHER SERIOUS COMORBIDITY PRESENT (HCC): ICD-10-CM

## 2023-12-08 PROCEDURE — 97140 MANUAL THERAPY 1/> REGIONS: CPT | Performed by: PHYSICAL THERAPIST

## 2023-12-08 PROCEDURE — 99214 OFFICE O/P EST MOD 30 MIN: CPT | Performed by: NURSE PRACTITIONER

## 2023-12-08 PROCEDURE — 90471 IMMUNIZATION ADMIN: CPT

## 2023-12-08 PROCEDURE — 90715 TDAP VACCINE 7 YRS/> IM: CPT

## 2023-12-08 PROCEDURE — 97110 THERAPEUTIC EXERCISES: CPT | Performed by: PHYSICAL THERAPIST

## 2023-12-08 PROCEDURE — 97112 NEUROMUSCULAR REEDUCATION: CPT | Performed by: PHYSICAL THERAPIST

## 2023-12-08 RX ORDER — METHOCARBAMOL 500 MG/1
500 TABLET, FILM COATED ORAL 2 TIMES DAILY PRN
Qty: 40 TABLET | Refills: 0 | Status: SHIPPED | OUTPATIENT
Start: 2023-12-08

## 2023-12-08 NOTE — PATIENT INSTRUCTIONS
Neck Exercises   AMBULATORY CARE:   Neck exercises  help reduce neck pain, and improve neck movement and strength. Neck exercises also help prevent long-term neck problems. Call your doctor if:   Your pain does not get better, or gets worse. You have questions or concerns about your condition, care, or exercise program.    What you need to know about exercise safety:   Move slowly, gently, and smoothly. Avoid fast or jerky motions. Stand and sit the way your healthcare provider shows you. Good posture may reduce your neck pain. Check your posture often, even when you are not doing your neck exercises. Follow the exercise program recommended by your healthcare provider. He or she will tell you which exercises are best for your condition. He or she will also tell you how many repetitions to do and how often you should do the exercises. How to perform neck exercises safely:   Exercise position:  You may sit or stand while you do neck exercises. Face forward. Your shoulders should be straight and relaxed, with a good posture. Head tilts, forward and back:  Gently bow your head and try to touch your chin to your chest. Your healthcare provider may tell you to push on the back of your neck to help bow your head. Raise your chin back to the starting position. Tilt your head back as far as possible so you are looking up at the ceiling. Your healthcare provider may tell you to lift your chin to help tilt your head back. Return your head to the starting position. Head tilts, side to side:  Tilt your head, bringing your ear toward your shoulder. Then tilt your head toward the other shoulder. Head turns:  Turn your head to look over your shoulder. Tilt your chin down and try to touch it to your shoulder. Do not raise your shoulder to your chin. Face forward again. Do the same on the other side.          Head rolls:  Slowly bring your chin toward your chest. Next, roll your head to the right. Your ear should be positioned over your shoulder. Hold this position for 5 seconds. Roll your head back toward your chest and to the left into the same position. Hold for 5 seconds. Gently roll your head back and around in a clockwise [place] 3 times. Next, move your head in the reverse direction (counterclockwise) in a [place] 3 times. Do not shrug your shoulders upwards while you do this exercise. Follow up with your doctor as directed:  Write down your questions so you remember to ask them during your visits. © Copyright [employer] 2023 Information is for End User's use only and may not be sold, redistributed or otherwise used for commercial purposes. The above information is an  only. It is not intended as medical advice for individual conditions or treatments. Talk to your doctor, nurse or pharmacist before following any medical regimen to see if it is safe and effective for you.

## 2023-12-08 NOTE — PROGRESS NOTES
Name: Yaquelin Dickinson      : 2000      MRN: [id number]  Encounter Provider: CYRUS Wilkins  Encounter Date: 2023   Encounter department: [address] [facility]     1. Encounter for immunization  -     TDAP VACCINE GREATER THAN OR EQUAL TO 6YO IM    2. Neck pain  Assessment & Plan:  Neck tension and spasms causing posterior HA, recommend gentle daily stretching and massage, muscle relaxer and provided exercises for home     Orders:  -     methocarbamol (ROBAXIN) 500 mg tablet; Take 1 tablet (500 mg total) by mouth 2 (two) times a day as needed for muscle spasms    3. Class 3 severe obesity due to excess calories with body mass index (BMI) of 40.0 to 44.9 in adult, unspecified whether serious comorbidity present [facility])  Assessment & Plan:  -Encouraged diet and lifestyle changes: decrease processed foods (cakes, cookies, chips, soda), decrease total carbohydrate intake, decrease fried/fatty foods, increase fruits and vegetables, increase lean proteins (chicken, turkey), increase healthy fats (avocado, fish, nuts), drink plenty of water (at least four 16 oz bottles per day)        4. Macromastia    5. Chronic bilateral thoracic back pain  Assessment & Plan:  2/2 macromastia   Continue PT, she has upcoming plastics evaluation for possible breast reduction              Subjective     25 YO female with PMH of macromastia and chronic back pain here today for follow up. She has been participating in PT, continues with back pain. Now getting frequent posterior HA. No other sx with HA. Has tried OTC analgesics w/o improvement in sx. The following portions of the patient's history were reviewed and updated as appropriate: allergies, current medications, past family history, past medical history, past social history, past surgical history and problem list.      Review of Systems   Constitutional:  Negative for chills and fever.    HENT:  Negative for ear pain and sore throat. Eyes:  Negative for pain and visual disturbance. Respiratory:  Negative for cough and shortness of breath. Cardiovascular:  Negative for chest pain and palpitations. Gastrointestinal:  Negative for abdominal pain and vomiting. Genitourinary:  Negative for dysuria and hematuria. Musculoskeletal:  Positive for arthralgias, back pain, myalgias and neck pain. Skin:  Negative for color change and rash. Neurological:  Positive for headaches. Negative for seizures and syncope. All other systems reviewed and are negative.       Past Medical History:   Diagnosis Date   • No known health problems      Past Surgical History:   Procedure Laterality Date   • NO PAST SURGERIES       Family History   Problem Relation Age of Onset   • Diabetes Father    • Breast cancer Neg Hx    • Colon cancer Neg Hx    • Ovarian cancer Neg Hx    • Cancer Neg Hx      Social History     Socioeconomic History   • Marital status: Single     Spouse name: None   • Number of children: None   • Years of education: None   • Highest education level: None   Occupational History   • None   Tobacco Use   • Smoking status: Never     Passive exposure: Never   • Smokeless tobacco: Never   Vaping Use   • Vaping Use: Never used   Substance and Sexual Activity   • Alcohol use: Yes     Comment: couple times/year   • Drug use: Never   • Sexual activity: Never     Partners: Male     Birth control/protection: None   Other Topics Concern   • None   Social History Narrative    [employer]     Social Determinants of Health     Financial Resource Strain: Low Risk  (10/11/2023)    Overall Financial Resource Strain (CARDIA)    • Difficulty of Paying Living Expenses: Not very hard   Food Insecurity: No Food Insecurity (10/11/2023)    Hunger Vital Sign    • Worried About Running Out of Food in the Last Year: Never true    • Ran Out of Food in the Last Year: Never true   Transportation Needs: No Transportation Needs (10/11/2023)    PRAPARE - Transportation    • Lack of Transportation (Medical): No    • Lack of Transportation (Non-Medical):  No   Physical Activity: Not on file   Stress: Not on file   Social Connections: Not on file   Intimate Partner Violence: Not on file   Housing Stability: Not on file     Current Outpatient Medications on File Prior to Visit   Medication Sig   • acetaminophen (TYLENOL) 650 mg CR tablet Take 1 tablet (650 mg total) by mouth every 8 (eight) hours as needed for mild pain   • azelastine (ASTELIN) 0.1 % nasal spray 1 spray into each nostril 2 (two) times a day Use in each nostril as directed   • fexofenadine (ALLEGRA) 180 MG tablet Take 1 tablet (180 mg total) by mouth daily   • ibuprofen (MOTRIN) 600 mg tablet Take 1 tablet (600 mg total) by mouth every 6 (six) hours as needed for mild pain   • omeprazole (PriLOSEC) 40 MG capsule TAKE ONE CAPSULE BY MOUTH EVERY DAY   • sodium chloride (OCEAN) 0.65 % nasal spray 1 spray into each nostril as needed for congestion     Allergies   Allergen Reactions   • Famotidine Rash     Immunization History   Administered Date(s) Administered   • COVID-19 Pfizer Vac BIVALENT Bobo-sucrose 12 Yr+ IM 07/27/2023, 07/27/2023   • DTaP 2001, 2001, 2001, 06/07/2002, 03/09/2005   • HPV Quadrivalent 04/08/2015   • HPV9 04/08/2015, 09/25/2019, 10/11/2023   • Hep A, ped/adol, 2 dose 06/18/2008, 01/02/2009   • Hep B / HiB 2001, 2001   • Hep B, Adolescent or Pediatric 2001, 2001, 2001   • Hepatitis A 06/18/2008, 01/02/2009   • HiB 2001, 06/07/2002   • INFLUENZA 12/01/2016   • IPV 2001, 2001, 03/09/2005   • Influenza, injectable, quadrivalent, preservative free 0.5 mL 09/25/2023   • MMR 03/14/2002, 03/09/2005   • Meningococcal Conjugate (MCV4O) 07/19/2012, 07/17/2018   • Meningococcal MCV4, Unspecified 07/19/2012   • Meningococcal MCV4P 07/19/2012   • Pneumococcal Conjugate PCV 7 2001, 2001, 2001, 07/02/2004   • Tdap 07/19/2012, 07/27/2023, 07/27/2023, 12/08/2023   • Varicella 03/14/2002, 06/13/2007       Objective     /70 (BP Location: Right arm, Patient Position: Sitting, Cuff Size: Large)   Pulse 80   Temp 97.9 °F (36.6 °C) (Temporal)   Resp 18   Ht 5' 7" (1.702 m)   Wt 120 kg (265 lb 8 oz)   LMP 12/01/2023 (Exact Date)   SpO2 99%   BMI 41.58 kg/m²     Physical Exam  Vitals and nursing note reviewed. Constitutional:       General: She is not in acute distress. Appearance: She is well-developed. She is not diaphoretic. HENT:      Head: Normocephalic and atraumatic. Right Ear: External ear normal.      Left Ear: External ear normal.   Eyes:      Pupils: Pupils are equal, round, and reactive to light. Cardiovascular:      Rate and Rhythm: Normal rate and regular rhythm. Pulmonary:      Effort: Pulmonary effort is normal. No respiratory distress. Breath sounds: Normal breath sounds. No wheezing. Abdominal:      General: Bowel sounds are normal. There is no distension. Palpations: Abdomen is soft. Tenderness: There is no abdominal tenderness. There is no guarding or rebound. Musculoskeletal:         General: No deformity. Normal range of motion. Cervical back: Normal range of motion and neck supple. Tenderness present. Thoracic back: Tenderness present. Comments: Bilateral thoracic TTP   Lymphadenopathy:      Cervical: No cervical adenopathy. Skin:     General: Skin is warm and dry. Capillary Refill: Capillary refill takes less than 2 seconds. Findings: No rash. Neurological:      General: No focal deficit present. Mental Status: She is alert and oriented to person, place, and time. Sensory: No sensory deficit.       Coordination: Coordination normal.      Deep Tendon Reflexes: Reflexes normal.   Psychiatric:         Behavior: Behavior normal.       Brenda Hart

## 2023-12-08 NOTE — PROGRESS NOTES
Daily Note     Today's date: 2023  Patient name: Leonarda Reese  : 2000  MRN: [id number]  Referring provider: Jodi Madsen, *  Dx:   Encounter Diagnosis     ICD-10-CM    1. Chronic midline low back pain without sciatica  M54.50     G89.29                      Subjective: Pt reports pain and stiffness in her middle and upper back. Objective: See treatment diary below      Assessment:  Pt does well w today's session and has improved thoracic rotation following rotation/extension stretching as well as T/s mobilizations. She continues to be fatigued with reverse flys , prone T's and Y's, and no money exercise. Patient demonstrated fatigue post treatment and would benefit from continued PT. Plan: Continue per plan of care. Progress treatment as tolerated.        Precautions: n/a      Manuals 11/2 11/3 11/13 11/17 11/24 11/27 12/4 12/8     Thoracic PA   KR grade IV 3 x 30"  KR grade IV 3 x 30"  KR grade IV 3 x 30"  KR grade IV 3 x 30"  KR grade IV 3 x 30"  KR grade IV 3 x 30"  SF Gr IV and V     Cervical SOR/ MFR UT   KR  PK KR KR KR KR      Thoracic parapsinal/ QL paraspinal   KR to amrita  np KR KR KR KR [facility]                  Neuro Re-Ed 11/2 11/3 11/13 11/17 11/24 11/27 12/4 12/8     Thoracic ext ( HEP) 3" x 10  3" x 10  3" x 10   3" x 10  3" x 10   3" x 10      Supine foam roll protocol   W/ towel x 15 ea  nv X15 ea  X15 ea  X15 ea  X20 ea       Mauri high to low row       13# 2 x 10  13# 2 x 10       Chin tucks (HEP) 3" x 10   3" x 10           No moneys (HEP) GTB 2 x 10  GTB 2 x 10  GTB 2 x 10  BTB 2 x 10 BTB 2 x 10  BTB x 27  BTB x 30  BTB x 30      Olin rows/ ext   13#/ 10# 2 x 10  13#/ 10# 2 x 10  14#/ 11# 3 x 10  14#/ 11# x25 15#/ 12# 2 x 10  15#/ 12# x25      Wall push ups              Ys/ Ts/ Is   nv 10x ea 2x10 B   2x10 B  nv 2x10 B     Reverse flys   GTB x 10  GTB 10x  BTB x 10   BTB x 10   BTB 2x10     Ther Ex 11/2 11/3 11/13 11/17 11/24 11/27 12/4 12/8     UT stretch (HEP) 3x20"             Open book stretch (HEP) 5x10"  5x10"  nv  5x10"  3x10" B  D/c       Thoracic ext/ rot stretch      5x10" B  5x10" B  5x10" B 5x10" B     Doorway pec stretch   5x10"  nv Supine 3x20"     3x30"     Pball flex stretch     10x10"  10x10"  nv 10x10"       QL stretc h     5x10" B  5x10" B                                 Ther Activity 11/2 11/3 11/13 11/17 11/24 11/27 12/4 12/8     Patient education  KR  PK          UBE  5' post 5' retro 5' retro   5' retro  5' retro  5' retro                               FOTO     X        Modalities 11/2 11/3 11/13 11/17 11/24 11/27 12/4      HP prn

## 2023-12-08 NOTE — ASSESSMENT & PLAN NOTE
Neck tension and spasms causing posterior HA, recommend gentle daily stretching and massage, muscle relaxer and provided exercises for home

## 2023-12-11 ENCOUNTER — APPOINTMENT (OUTPATIENT)
Dept: PHYSICAL THERAPY | Facility: CLINIC | Age: 23
End: 2023-12-11
Payer: COMMERCIAL

## 2023-12-11 NOTE — PROGRESS NOTES
PT Evaluation     Today's date: 2023  Patient name: Fransisca Angela  : 2000  MRN: [id number]  Referring provider: Pat Spicer, *  Dx:   No diagnosis found.                 Assessment  Assessment details: Fransisca Angela  is a pleasant 23 y.o. female who presents with decreased posterior chain activation, thoracic spine mobility deficits, and muscle restrictions throughout the cervical/thoracic region. Tolerated session without adverse effects. Recommend continued skilled therapy to improve overall strength and mobility for functional return with decreased compensation and pain.    Impairments: impaired balance, impaired physical strength and pain with function    Symptom irritability: moderateUnderstanding of Dx/Px/POC: good   Prognosis: good    Goals  Short Term Goals: to be achieved by 4 weeks  1) Patient to be independent with basic HEP.  2) Decrease pain to 3/10 at its worst.  3) Increase lumbar spine ROM by 50% in all deficient planes.   4) Increase LE strength by 1/2 MMT grade in all deficient planes.  5) Patient to report decreased sleep interruption secondary to pain.  6) Increase seated and ambulatory to tolerance  30 min without increased pain.     Long Term Goals: to be achieved by discharge  1) FOTO equal to or greater than .  2) Patient to be independent with comprehensive HEP.  3) Abolish pain for improved quality of life.  4) Lumbar spine ROM WNL all planes to improve a/iadls.  5) Increase LE strength to 5/5 MMT grade in all planes to improve a/iadls.  6) Patient to report no sleep interruption secondary to pain.  7) Increase seated/ bending and ambulatory tolerance to 60 mins without pain in 6-8 weeks     Plan  Patient would benefit from: PT eval and skilled physical therapy  Referral necessary: No  Planned modality interventions: cryotherapy, thermotherapy: hydrocollator packs and TENS  Planned therapy interventions: IASTM, joint mobilization, kinesiology taping, manual  therapy, neuromuscular re-education, patient education, postural training, strengthening, stretching, therapeutic activities, therapeutic exercise, home exercise program and coordination  Frequency: 2x week  Duration in weeks: 8  Plan of Care beginning date: 12/11/2023  Plan of Care expiration date: 2/5/2024  Treatment plan discussed with: patient        Subjective Evaluation    History of Present Illness  Mechanism of injury: Fransisca Angela presents with c/c of thoracic/ mid spine pain.           Not a recurrent problem   Quality of life: good          Objective     Concurrent Complaints  Positive for headaches. Negative for night pain, disturbed sleep, dizziness, faints, nausea/motion sickness, bladder dysfunction, bowel dysfunction and saddle (S4) numbness    Palpation     Additional Palpation Details  Increased spasming along the B paraspinals and QL lumbar into the thoracic spine B   UT/ levator tenderness noted B increased palpable spasming   SOR restrictions/ tenderness B with provocation of HA  QL tenderness B at lumbar attachment     Neurological Testing     Sensation   Cervical/Thoracic   Left   Intact: light touch    Right   Intact: light touch    Comments   Left light touch: C3-T1.   Right light touch: C3-T1.     Active Range of Motion   Cervical/Thoracic Spine       Thoracic    Flexion: Active thoracic flexion: tension/ pain in the thoracic spine.  with pain Restriction level: maximal  Extension: Active thoracic extension: increased stiffness.     with pain Restriction level: maximal  Left lateral flexion:  with pain Restriction level: moderate  Right lateral flexion:  with pain Restriction level: moderate    Lumbar   Flexion:  Restriction level: minimal  Extension: Active lumbar extension: increased pressure in T/ L jxn.  with pain Restriction level: moderate  Left lateral flexion:  Restriction level: minimal  Right lateral flexion:  Restriction level: minimal  Left rotation:  with pain Restriction  "level: minimal  Right rotation:  with pain Restriction level: minimal    Additional Active Range of Motion Details  Increased stiffness throughout the spine/ mild shoulder pain noted   T/l jxn pressure with lumbar movement     Joint Play   Joints within functional limits: T7 and T8     Hypermobile: C3, C4, C5, C6, C7, L1, L2, L3 and L4     Hypomobile: T1, T2, T3, T4, T5, T6, T9, T10, T11, L5 and S1     Pain: T9, T10, T11 and T12     Strength/Myotome Testing   Cervical Spine     Left   Levator scapulae (C4): 4-    Right   Levator scapulae (C4): 4-    Left Shoulder     Planes of Motion   Flexion: 4+   Abduction: 4   External rotation at 0°: 4   Internal rotation at 0°: 4+     Isolated Muscles   Levator scapulae: 4-   Lower trapezius: 3   Middle trapezius: 3+   Serratus anterior: 4-   Upper trapezius: 4-     Right Shoulder     Planes of Motion   Flexion: 4+   Abduction: 4   External rotation at 0°: 4   Internal rotation at 0°: 4+     Isolated Muscles   Levator scapulae: 4-   Lower trapezius: 3   Middle trapezius: 3+   Serratus anterior: 4-   Upper trapezius: 4-     Left Elbow   Flexion: 5  Extension: 5    Right Elbow   Flexion: 5  Extension: 5  Neuro Exam:     Headaches   Patient reports headaches: Yes.              Precautions: n/a        Manuals 11/2 11/3 11/13 11/17 11/24 11/27 12/4 12/8  12/11     Thoracic PA    KR grade IV 3 x 30\"  KR grade IV 3 x 30\"  KR grade IV 3 x 30\"  KR grade IV 3 x 30\"  KR grade IV 3 x 30\"  KR grade IV 3 x 30\"   Gr IV and V       Cervical SOR/ MFR UT    KR  PK KR KR KR KR         Thoracic parapsinal/ QL paraspinal    KR to amrita  np KR KR KR KR SF                               Neuro Re-Ed 11/2 11/3 11/13 11/17 11/24 11/27 12/4 12/8  12/11     Thoracic ext ( HEP) 3\" x 10  3\" x 10  3\" x 10    3\" x 10  3\" x 10    3\" x 10        Supine foam roll protocol    W/ towel x 15 ea  nv X15 ea  X15 ea  X15 ea  X20 ea          Mauri high to low row            13# 2 x 10  13# 2 x 10          Chin tucks " "(HEP) 3\" x 10    3\" x 10                  No moneys (HEP) GTB 2 x 10  GTB 2 x 10  GTB 2 x 10  BTB 2 x 10 BTB 2 x 10  BTB x 27  BTB x 30  BTB x 30        Mauri rows/ ext    13#/ 10# 2 x 10  13#/ 10# 2 x 10  14#/ 11# 3 x 10  14#/ 11# x25 15#/ 12# 2 x 10  15#/ 12# x25         Wall push ups                        Ys/ Ts/ Is    nv 10x ea 2x10 B    2x10 B  nv 2x10 B       Reverse flys    GTB x 10  GTB 10x  BTB x 10    BTB x 10    BTB 2x10       Ther Ex 11/2 11/3 11/13 11/17 11/24 11/27 12/4 12/8 12/111      UT stretch (HEP) 3x20\"                      Open book stretch (HEP) 5x10\"  5x10\"  nv   5x10\"  3x10\" B  D/c          Thoracic ext/ rot stretch          5x10\" B  5x10\" B  5x10\" B 5x10\" B       Doorway pec stretch    5x10\"  nv Supine 3x20\"        3x30\"       Pball flex stretch        10x10\"  10x10\"  nv 10x10\"          QL stretc h         5x10\" B   5x10\" B                                                          Ther Activity 11/2 11/3 11/13 11/17 11/24 11/27 12/4 12/8  12/11     Patient education  KR   PK                 UBE   5' post 5' retro 5' retro    5' retro  5' retro  5' retro                                                       FOTO         X             Modalities 11/2 11/3 11/13 11/17 11/24 11/27 12/4    12/11     HP prn                                                            "

## 2023-12-14 ENCOUNTER — CLINICAL SUPPORT (OUTPATIENT)
Dept: OBGYN CLINIC | Facility: CLINIC | Age: 23
End: 2023-12-14

## 2023-12-14 VITALS
SYSTOLIC BLOOD PRESSURE: 125 MMHG | HEIGHT: 67 IN | BODY MASS INDEX: 41.78 KG/M2 | DIASTOLIC BLOOD PRESSURE: 88 MMHG | HEART RATE: 116 BPM | WEIGHT: 266.2 LBS

## 2023-12-14 DIAGNOSIS — Z23 NEED FOR HPV VACCINE: Primary | ICD-10-CM

## 2023-12-14 PROCEDURE — 90471 IMMUNIZATION ADMIN: CPT

## 2023-12-14 PROCEDURE — 90651 9VHPV VACCINE 2/3 DOSE IM: CPT

## 2023-12-15 ENCOUNTER — EVALUATION (OUTPATIENT)
Dept: PHYSICAL THERAPY | Facility: CLINIC | Age: 23
End: 2023-12-15
Payer: COMMERCIAL

## 2023-12-15 DIAGNOSIS — G89.29 CHRONIC MIDLINE LOW BACK PAIN WITHOUT SCIATICA: Primary | ICD-10-CM

## 2023-12-15 DIAGNOSIS — M54.50 CHRONIC MIDLINE LOW BACK PAIN WITHOUT SCIATICA: Primary | ICD-10-CM

## 2023-12-15 PROCEDURE — 97110 THERAPEUTIC EXERCISES: CPT

## 2023-12-15 PROCEDURE — 97164 PT RE-EVAL EST PLAN CARE: CPT

## 2023-12-15 PROCEDURE — 97140 MANUAL THERAPY 1/> REGIONS: CPT

## 2023-12-15 PROCEDURE — 97112 NEUROMUSCULAR REEDUCATION: CPT

## 2023-12-15 NOTE — PROGRESS NOTES
PT Re-Evaluation     Today's date: 12/15/2023  Patient name: Juany Fam  : 2000  MRN: [id number]  Referring provider: Ebony Chan, *  Dx:   Encounter Diagnosis     ICD-10-CM    1. Chronic midline low back pain without sciatica  M54.50     G89.29                        Assessment  Assessment details: Fransisca Angela  is a pleasant 26 y.o. female who presents with decreased posterior chain activation, thoracic spine mobility deficits, and muscle restrictions throughout the cervical/thoracic region. Notes increased piain and stiffness in the thoracic and lumbar spine since beginning her new job which requires lifting. Increased TTP and spasming noted throughout the paraspinals and on the thoracic spine with minimal relief post release. UE MMT indicates slight improvement averaging > 4/ 5 throughout with continued scapular deficits and postural deficits. Overall exhibits improved strength with increased spasming/ mobility deficits secondary to increased lifting duties. Difficulty isolating muscles to achieve improved ROM. Tolerated session without adverse effects. Recommend continued skilled therapy to improve overall strength and mobility for functional return with decreased compensation and pain. Impairments: impaired balance, impaired physical strength and pain with function    Symptom irritability: moderateUnderstanding of Dx/Px/POC: good   Prognosis: good    Goals  Short Term Goals: to be achieved by 4 weeks  1) Patient to be independent with basic HEP.- MET   2) Decrease pain to 3/10 at its worst.- NOT MET 8/10   3) Increase lumbar spine ROM by 50% in all deficient planes. - PROGRESSING slight improvement noted   4) Increase LE strength by 1/2 MMT grade in all deficient planes. - PROGRESSING improved strength noted. 5) Patient to report decreased sleep interruption secondary to pain.- NOT MET   6) Increase seated and ambulatory to tolerance  30 min without increased pain. - PROGRESSING improved prolonged positioning tolerance      Long Term Goals: to be achieved by discharge  1) FOTO equal to or greater than expected outcome- PROGRESSING   2) Patient to be independent with comprehensive HEP. - PROGRESSING  3) Abolish pain for improved quality of life.- NOT MET continued high levels of pain  4) Lumbar spine ROM WNL all planes to improve a/iadls.- NOT MET continued high levels of pain   5) Increase LE strength to 5/5 MMT grade in all planes to improve a/iadls. - PROGRESSING   6) Patient to report no sleep interruption secondary to pain.- NOT MET   7) Increase seated/ bending and ambulatory tolerance to 60 mins without pain in 6-8 weeks - [address]  Patient would benefit from: PT eval and skilled physical therapy  Referral necessary: No  Planned modality interventions: cryotherapy, thermotherapy: hydrocollator packs and TENS  Planned therapy interventions: IASTM, joint mobilization, kinesiology taping, manual therapy, neuromuscular re-education, patient education, postural training, strengthening, stretching, therapeutic activities, therapeutic exercise, home exercise program and coordination  Frequency: 2x week  Duration in weeks: 8  Plan of Care beginning date: 12/15/2023  Plan of Care expiration date: 1/12/2024  Treatment plan discussed with: patient        Subjective Evaluation    History of Present Illness  Mechanism of injury: Fransisca Angela presents with c/c of thoracic/ mid spine pain following ~ 6 weeks of PT." I started a new job at a [employer] home and I have to lift people so I don't think that helps my back". Reports 8/10 pain in the back today. Notes she started muscle relaxers which haven't helped at this point. Notes increased fatigue in the back with prolonged activation. Reports pain had been less until starting this job. Not a recurrent problem   Quality of life: good          Objective     Concurrent Complaints  Positive for headaches.  Negative for night pain, disturbed sleep, dizziness, faints, nausea/motion sickness, bladder dysfunction, bowel dysfunction and saddle (S4) numbness    Palpation     Additional Palpation Details  Increased spasming along the B paraspinals and QL lumbar into the thoracic spine B - continues R > L today   UT/ levator tenderness noted B increased palpable spasming - TTP throughout   SOR restrictions/ tenderness B with provocation of HA  QL tenderness B at lumbar attachment     Neurological Testing     Sensation   Cervical/Thoracic   Left   Intact: light touch    Right   Intact: light touch    Comments   Left light touch: C3-T1. Right light touch: C3-T1. Active Range of Motion   Cervical/Thoracic Spine       Thoracic    Flexion: Active thoracic flexion: pain in the middle of the spine. with pain Restriction level: minimal  Extension: Active thoracic extension: pain in the L side of the back. with pain Restriction level: maximal  Left lateral flexion:  with pain Restriction level: minimal  Right lateral flexion:  with pain Restriction level: moderate    Lumbar   Flexion:  with pain Restriction level: minimal  Extension: Active lumbar extension: increased pain in the low back.   with pain Restriction level: minimal  Left lateral flexion:  Restriction level: minimal  Right lateral flexion:  Restriction level: minimal  Left rotation:  with pain Restriction level: moderate  Right rotation:  with pain Restriction level: moderate    Additional Active Range of Motion Details  Pain noted throughout the thoracic spine with movement in all planes     Note overall decrease in motion and increased pain/ stiffness throughout the spine       Joint Play   Joints within functional limits: T7 and T8     Hypermobile: C3, C4, C5, C6, C7, L1, L2, L3 and L4     Hypomobile: T1, T2, T3, T4, T5, T6, T9, T10, T11, L5 and S1     Pain: T5, T6, T7, T8, T9, T10, T11 and T12     Strength/Myotome Testing   Cervical Spine     Left   Levator scapulae (C4): 4-    Right Levator scapulae (C4): 4-    Left Shoulder     Planes of Motion   Flexion: 4+   Abduction: 4+   External rotation at 0°: 4+   Internal rotation at 0°: 4+     Isolated Muscles   Levator scapulae: 4-   Lower trapezius: 3+   Middle trapezius: 3+   Serratus anterior: 4-   Upper trapezius: 4-     Right Shoulder     Planes of Motion   Flexion: 4+   Abduction: 4+   External rotation at 0°: 4+   Internal rotation at 0°: 4+     Isolated Muscles   Levator scapulae: 4-   Lower trapezius: 3+   Middle trapezius: 3+   Serratus anterior: 4-   Upper trapezius: 4-     Left Elbow   Flexion: 5  Extension: 5    Right Elbow   Flexion: 5  Extension: 5  Neuro Exam:     Headaches   Patient reports headaches: Yes.               Precautions: n/a        Manuals 11/2 11/3 11/13 11/17 11/24 11/27 12/4 12/8 12/15    Thoracic PA    KR grade IV 3 x 30"  KR grade IV 3 x 30"  KR grade IV 3 x 30"  KR grade IV 3 x 30"  KR grade IV 3 x 30"  KR grade IV 3 x 30"  SF Gr IV and V KR grade III and IV    Cervical SOR/ MFR UT    KR  PK KR KR KR KR       Thoracic parapsinal/ QL paraspinal    KR to amrita  np KR KR KR KR [facility] KR                          Neuro Re-Ed 11/2 11/3 11/13 11/17 11/24 11/27 12/4 12/8 12/15    Thoracic ext ( HEP) 3" x 10  3" x 10  3" x 10    3" x 10  3" x 10    3" x 10      Supine foam roll protocol    W/ towel x 15 ea  nv X15 ea  X15 ea  X15 ea  X20 ea    X15 ea    TA contraction          5" x 15    [place] high to low row            13# 2 x 10  13# 2 x 10        Chin tucks (HEP) 3" x 10    3" x 10                No moneys (HEP) GTB 2 x 10  GTB 2 x 10  GTB 2 x 10  BTB 2 x 10 BTB 2 x 10  BTB x 27  BTB x 30  BTB x 30  BTB x 30     [place] rows/ ext    13#/ 10# 2 x 10  13#/ 10# 2 x 10  14#/ 11# 3 x 10  14#/ 11# x25 15#/ 12# 2 x 10  15#/ 12# x25       Quad TA act                 5" x 10    Ys/ Ts/ Is    nv 10x ea 2x10 B    2x10 B  nv 2x10 B     Reverse flys    GTB x 10  GTB 10x  BTB x 10    BTB x 10    BTB 2x10     Ther Ex 11/2 11/3 11/13 11/17 11/24 11/27 12/4 12/8 12/15    UT stretch (HEP) 3x20"                    Open book stretch (HEP) 5x10"  5x10"  nv   5x10"  3x10" B  D/c        Thoracic ext/ rot stretch          5x10" B  5x10" B  5x10" B 5x10" B     Doorway pec stretch    5x10"  nv Supine 3x20"        3x30"     Pball flex stretch        10x10"  10x10"  nv 10x10"    10x10"    QL stretc h         5x10" B   5x10" B         child's pose                 5x10" B      cat camel                  X15     Ther Activity 11/2 11/3 11/13 11/17 11/24 11/27 12/4 12/8 12/15    Patient education  KR   PK           KR    UBE   5' post 5' retro 5' retro    5' retro  5' retro  5' retro                                                 FOTO         X       X    Modalities 11/2 11/3 11/13 11/17 11/24 11/27 12/4       HP prn

## 2023-12-18 NOTE — PROGRESS NOTES
Patient given 4th HPV on left deltoid on 12/14/23    NDC# 1248-0226-09  LOT# [id number]  EXP# [id number]

## 2024-01-11 ENCOUNTER — CONSULT (OUTPATIENT)
Dept: PLASTIC SURGERY | Facility: CLINIC | Age: 24
End: 2024-01-11
Payer: COMMERCIAL

## 2024-01-11 VITALS
WEIGHT: 263 LBS | TEMPERATURE: 97.7 F | BODY MASS INDEX: 39.86 KG/M2 | HEIGHT: 68 IN | SYSTOLIC BLOOD PRESSURE: 106 MMHG | DIASTOLIC BLOOD PRESSURE: 78 MMHG | HEART RATE: 87 BPM

## 2024-01-11 DIAGNOSIS — N62 MACROMASTIA: Primary | ICD-10-CM

## 2024-01-11 PROCEDURE — 99244 OFF/OP CNSLTJ NEW/EST MOD 40: CPT | Performed by: STUDENT IN AN ORGANIZED HEALTH CARE EDUCATION/TRAINING PROGRAM

## 2024-01-12 NOTE — PROGRESS NOTES
"Plastic Surgery Consult    Reason for visit: heavy breasts    HPI from 1/11/24  Patient is a 24 y/o female who presents with symptomatic macromastia. She complains of large, heavy, pendulous breasts that causes her chest, upper back, neck, and shoulder discomfort. She also complains of rashes underneath her breast during humid weather and has difficulty exercising. She has tried conservative therapy including supportive bra, ibuprofen, and creams/powders under her breasts with minimal symptomatic improvement. She denies lumps or discharge on self-exams.    She currently wears DDish and would like to be \"B-C\" cup ideally.    Mammogram status: none    Plans to have more children: maybe    Physical therapy attempted: completed      ROS: 12 pt ROS negative, except as otherwise noted in HPI.    PMH: none  FamHx: non-contrib  SurgHx: wisdom teeth  SocHx: no tobacco,no smoking +social ETOH  Meds: no blood thinners, no steroids  Allergies: famotidine    PE:    Vitals:    01/11/24 1456   BP: 106/78   Pulse: 87   Temp: 97.7 °F (36.5 °C)       General: NC/AT, breathing comfortably on RA  Neuro: CN II-XII grossly intact, symmetric reflexes  HEENT: PERRLA, EOMI, external ears normal, no lesions or deformities, neck supple, trachea midline  Respiratory: CTAB, normal respiratory effort  Cardio: RRR, normal S1, S2, no murmur, rubs, gallops  GI: soft, non-tender, non-distended  MSK: normal alignment, mobility, gait  Skin: no rashes, lesions, subcutaneous nodules      BMI: 40  BSA: 2.28    Breast Exam:  Bilateral large ptotic breasts, with severe grade III ptosis bilaterally  Enlarged areolas  Bilateral shoulder grooving  No masses, skin dimpling, or discharge  Diminished nipple sensation bilaterally      Measurements:    R  L  SN-N  40 cm  38 cm  N-IMF  14 cm  14 cm      Labs: none    Imaging: none    A/P: 24 y/o female with symptomatic macromastia.    -Patient would benefit from bilateral reduction mammoplasty. Technique: " wise-pattern with inferior pedicle with estimated resection weight of 500-650 g per side. Patient's elevated BSA is partly attributable to her height as she is 5 foot 8 inches. Any greater resection would make her flat chested.  -Discussed with patient the risks, benefits, procedure, and complications. Discussed changes/loss/hypersentivity in nipple sensation, diminished breast feeding ability and increase in size of breasts should she become pregnant. Discussed risk of partial vs complete nipple loss due to vascular issues. Discussed that breast reduction will help but may not completely resolve her back, chest, upper neck, and shoulder pain. Due to patient's obesity, I discussed the increased risk of surgical complications including infection, seroma, fat necrosis, abscess, wound dehisence. All of these risks increase with increasing BMI. Due to her large pendulous breasts, the possibility of free nipple graft was also discussed.   -Re-iterated the increased risk of complications due to her elevated BMI of 40  -Patient's questions answered, concerns addressed.  -Photos taken, will submit to insurance  -Spent 45 minutes in consultation with patient. Greater than 50% of the total time was spent obtaining history, evaluation, performing exam, discussion of management options including post-operative care, answering patient's questions and concerns, chart reviewing, and documentation      Donnie Amezquita MD   [facility]   [address] [place] [address], [address]   [place] [address]   Office: [phone]

## 2024-01-15 ENCOUNTER — OFFICE VISIT (OUTPATIENT)
Dept: URGENT CARE | Age: 24
End: 2024-01-15
Payer: COMMERCIAL

## 2024-01-15 VITALS
HEART RATE: 89 BPM | DIASTOLIC BLOOD PRESSURE: 80 MMHG | WEIGHT: 260 LBS | OXYGEN SATURATION: 98 % | BODY MASS INDEX: 40.81 KG/M2 | SYSTOLIC BLOOD PRESSURE: 112 MMHG | TEMPERATURE: 99.3 F | HEIGHT: 67 IN | RESPIRATION RATE: 20 BRPM

## 2024-01-15 DIAGNOSIS — S00.31XA ABRASION OF NOSE, INITIAL ENCOUNTER: ICD-10-CM

## 2024-01-15 DIAGNOSIS — J02.9 SORE THROAT: ICD-10-CM

## 2024-01-15 DIAGNOSIS — J06.9 ACUTE URI: Primary | ICD-10-CM

## 2024-01-15 LAB
S PYO AG THROAT QL: NEGATIVE
SARS-COV-2 AG UPPER RESP QL IA: NEGATIVE
VALID CONTROL: NORMAL

## 2024-01-15 PROCEDURE — 87811 SARS-COV-2 COVID19 W/OPTIC: CPT

## 2024-01-15 PROCEDURE — 87880 STREP A ASSAY W/OPTIC: CPT

## 2024-01-15 PROCEDURE — 99213 OFFICE O/P EST LOW 20 MIN: CPT

## 2024-01-15 NOTE — PATIENT INSTRUCTIONS
Start mupirocin as prescribed  Vitamin D3 2000 IU daily  Vitamin C 1000mg twice per day  Multivitamin daily  Fluids and rest  Over the counter cold medication as needed (EX: Mucinex, tylenol/motrin)  Follow up with PCP in 3-5 days.  Proceed to ER if symptoms worsen.

## 2024-01-15 NOTE — LETTER
January 15, 2024     Patient: Fransisca Angela   YOB: 2000   Date of Visit: 1/15/2024       To Whom it May Concern:    Fransisca Angela was seen in my clinic on 1/15/2024. She may return to work on 1/16/2024 .    If you have any questions or concerns, please don't hesitate to call.         Sincerely,          Mariaa Juárez PA-C        CC: No Recipients

## 2024-01-15 NOTE — PROGRESS NOTES
"  [facility]        NAME: Franssica Angela is a 23 y.o. female  : 2000    MRN: [id number]  DATE: January 15, 2024  TIME: 6:39 PM    Assessment and Plan   Acute URI [J06.9]  1. Acute URI        2. Sore throat  POCT rapid ANTIGEN strepA    Poct Covid 19 Rapid Antigen Test      3. Abrasion of nose, initial encounter  mupirocin (BACTROBAN) 2 % ointment          POCT rapid strep: Negative    POCT rapid covid: Negative    Patient Instructions     Start mupirocin as prescribed  Vitamin D3 2000 IU daily  Vitamin C 1000mg twice per day  Multivitamin daily  Fluids and rest  Over the counter cold medication as needed (EX: Mucinex, tylenol/motrin)  Follow up with PCP in 3-5 days.  Proceed to  ER if symptoms worsen.    Chief Complaint     Chief Complaint   Patient presents with    Cold Like Symptoms     Pt started Thursday irving with nasal congestion, SOB, sore throat, jaw pain, sweats, and cough.  Taking Mucinex and Dayquil.   Denies fevers. No home Covid testing performed.           History of Present Illness       Patient is a 24 yo female with no significant PMH presenting in the clinic today for cold sx x 4 days. Admits sore throat, congestion, cough, \"sweats\", fatigue, intermittent headache, intermittent dizziness, and nausea. Patient notes b/l nostril pain \"which hurts when I breathe in the cold air\". Denies fever, chills, body aches, ear pain, chest pain, SOB, abdominal pain, v/d. Admits the use of Mucinex and DayQuil for sx management. Denies recent sick contacts. Patient requesting work excuse note.        Review of Systems   Review of Systems   Constitutional:  Positive for fatigue. Negative for chills and fever.   HENT:  Positive for congestion and sore throat. Negative for ear pain, postnasal drip, rhinorrhea, sinus pressure and sinus pain.    Respiratory:  Positive for cough. Negative for shortness of breath.    Cardiovascular:  Negative for chest pain.   Gastrointestinal:  Positive for nausea. " Negative for abdominal pain, diarrhea and vomiting.   Musculoskeletal:  Negative for myalgias.   Skin:  Negative for rash.   Neurological:  Positive for headaches.         Current Medications       Current Outpatient Medications:     acetaminophen (TYLENOL) 650 mg CR tablet, Take 1 tablet (650 mg total) by mouth every 8 (eight) hours as needed for mild pain, Disp: 30 tablet, Rfl: 0    azelastine (ASTELIN) 0.1 % nasal spray, 1 spray into each nostril 2 (two) times a day Use in each nostril as directed, Disp: 30 mL, Rfl: 1    fexofenadine (ALLEGRA) 180 MG tablet, Take 1 tablet (180 mg total) by mouth daily, Disp: 90 tablet, Rfl: 1    ibuprofen (MOTRIN) 600 mg tablet, Take 1 tablet (600 mg total) by mouth every 6 (six) hours as needed for mild pain, Disp: 30 tablet, Rfl: 0    methocarbamol (ROBAXIN) 500 mg tablet, Take 1 tablet (500 mg total) by mouth 2 (two) times a day as needed for muscle spasms, Disp: 40 tablet, Rfl: 0    mupirocin (BACTROBAN) 2 % ointment, Apply topically 2 (two) times a day, Disp: 30 g, Rfl: 0    omeprazole (PriLOSEC) 40 MG capsule, TAKE ONE CAPSULE BY MOUTH EVERY DAY, Disp: 30 capsule, Rfl: 0    sodium chloride (OCEAN) 0.65 % nasal spray, 1 spray into each nostril as needed for congestion (Patient not taking: Reported on 1/11/2024), Disp: 104 mL, Rfl: 2    Current Allergies     Allergies as of 01/15/2024 - Reviewed 01/15/2024   Allergen Reaction Noted    Famotidine Rash 04/26/2023            The following portions of the patient's history were reviewed and updated as appropriate: allergies, current medications, past family history, past medical history, past social history, past surgical history and problem list.     Past Medical History:   Diagnosis Date    Allergic rhinitis     GERD (gastroesophageal reflux disease)     No known health problems        Past Surgical History:   Procedure Laterality Date    NO PAST SURGERIES         Family History   Problem Relation Age of Onset    Diabetes  "Father     Breast cancer Neg Hx     Colon cancer Neg Hx     Ovarian cancer Neg Hx     Cancer Neg Hx          Medications have been verified.        Objective   /80   Pulse 89   Temp 99.3 °F (37.4 °C) (Tympanic)   Resp 20   Ht 5' 7\" (1.702 m)   Wt 118 kg (260 lb)   LMP 12/13/2023 (Approximate)   SpO2 98%   BMI 40.72 kg/m²        Physical Exam     Physical Exam  Vitals reviewed.   Constitutional:       General: She is not in acute distress.     Appearance: Normal appearance. She is normal weight. She is not ill-appearing.   HENT:      Head: Normocephalic.      Right Ear: Hearing, tympanic membrane, ear canal and external ear normal. No middle ear effusion. There is no impacted cerumen. Tympanic membrane is not erythematous or bulging.      Left Ear: Hearing, tympanic membrane, ear canal and external ear normal.  No middle ear effusion. There is no impacted cerumen. Tympanic membrane is not erythematous or bulging.      Nose: Nasal tenderness and congestion present. No rhinorrhea.      Right Sinus: No maxillary sinus tenderness or frontal sinus tenderness.      Left Sinus: No maxillary sinus tenderness or frontal sinus tenderness.      Comments: Nasal mucosal erythema with small abrasion along the nasal mucosa of the left nostril     Mouth/Throat:      Lips: Pink.      Mouth: Mucous membranes are moist.      Pharynx: Oropharynx is clear. Uvula midline. Posterior oropharyngeal erythema present. No pharyngeal swelling, oropharyngeal exudate or uvula swelling.   Eyes:      General:         Right eye: No discharge.         Left eye: No discharge.      Conjunctiva/sclera: Conjunctivae normal.   Cardiovascular:      Rate and Rhythm: Normal rate and regular rhythm.      Pulses: Normal pulses.      Heart sounds: Normal heart sounds. No murmur heard.     No friction rub. No gallop.   Pulmonary:      Effort: Pulmonary effort is normal. No respiratory distress.      Breath sounds: Normal breath sounds. No stridor. " No wheezing, rhonchi or rales.   Musculoskeletal:      Cervical back: Normal range of motion and neck supple. No tenderness.   Lymphadenopathy:      Cervical: No cervical adenopathy.   Skin:     General: Skin is warm.      Findings: No rash.   Neurological:      Mental Status: She is alert.   Psychiatric:         Mood and Affect: Mood normal.         Behavior: Behavior normal.

## 2024-01-25 ENCOUNTER — OFFICE VISIT (OUTPATIENT)
Dept: FAMILY MEDICINE CLINIC | Facility: CLINIC | Age: 24
End: 2024-01-25

## 2024-01-25 VITALS
OXYGEN SATURATION: 98 % | SYSTOLIC BLOOD PRESSURE: 110 MMHG | DIASTOLIC BLOOD PRESSURE: 60 MMHG | HEART RATE: 85 BPM | BODY MASS INDEX: 41.94 KG/M2 | WEIGHT: 267.8 LBS | TEMPERATURE: 97.4 F

## 2024-01-25 DIAGNOSIS — G89.29 CHRONIC BILATERAL THORACIC BACK PAIN: ICD-10-CM

## 2024-01-25 DIAGNOSIS — E66.01 CLASS 3 SEVERE OBESITY DUE TO EXCESS CALORIES WITH BODY MASS INDEX (BMI) OF 40.0 TO 44.9 IN ADULT, UNSPECIFIED WHETHER SERIOUS COMORBIDITY PRESENT (HCC): ICD-10-CM

## 2024-01-25 DIAGNOSIS — N62 MACROMASTIA: ICD-10-CM

## 2024-01-25 DIAGNOSIS — M54.2 NECK PAIN: Primary | ICD-10-CM

## 2024-01-25 DIAGNOSIS — M54.6 CHRONIC BILATERAL THORACIC BACK PAIN: ICD-10-CM

## 2024-01-25 RX ORDER — LIDOCAINE 50 MG/G
1 PATCH TOPICAL DAILY
Qty: 90 PATCH | Refills: 0 | Status: SHIPPED | OUTPATIENT
Start: 2024-01-25

## 2024-01-25 NOTE — PROGRESS NOTES
Name: Fransisca Angela      : 2000      MRN: [id number]  Encounter Provider: CYRUS Montiel  Encounter Date: 2024   Encounter department: [facility]    Assessment & Plan     1. Neck pain  Assessment & Plan:  Neck tension and spasms causing posterior HA, recommend gentle daily stretching and massage, muscle relaxer and provided exercises for home       2. Macromastia  Assessment & Plan:  Patient had consult with plastics, due to high BMI she would have better outcomes if she loses weight prior to having a reduction       3. Chronic bilateral thoracic back pain  -     lidocaine (Lidoderm) 5 %; Apply 1 patch topically over 12 hours daily Remove & Discard patch within 12 hours or as directed by MD    4. Class 3 severe obesity due to excess calories with body mass index (BMI) of 40.0 to 44.9 in adult, unspecified whether serious comorbidity present (HCC)  Assessment & Plan:  Wt Readings from Last 3 Encounters:   24 121 kg (267 lb 12.8 oz)   01/15/24 118 kg (260 lb)   24 119 kg (263 lb)     Referral to weight management   -Encouraged diet and lifestyle changes: decrease processed foods (cakes, cookies, chips, soda), decrease total carbohydrate intake, decrease fried/fatty foods, increase fruits and vegetables, increase lean proteins (chicken, turkey), increase healthy fats (avocado, fish, nuts), drink plenty of water (at least four 16 oz bottles per day)      Orders:  -     Ambulatory Referral to Weight Management; Future      BMI Counseling: Body mass index is 41.94 kg/m². The BMI is above normal. Nutrition recommendations include decreasing portion sizes, encouraging healthy choices of fruits and vegetables, decreasing fast food intake, consuming healthier snacks and limiting drinks that contain sugar. Rationale for BMI follow-up plan is due to patient being overweight or obese.         Subjective     24 YO female with PMH of chronic right knee pain,  chronic mid and low back pain presents today for follow up. Reports that pain has not improved. She is interested in having an evaluation with bariatrics prior to a consult with plastic surgery for possible breast reduction.     The following portions of the patient's history were reviewed and updated as appropriate: allergies, current medications, past family history, past medical history, past social history, past surgical history and problem list.        Review of Systems   Constitutional:  Negative for chills and fever.   HENT:  Negative for ear pain and sore throat.    Eyes:  Negative for pain and visual disturbance.   Respiratory:  Negative for cough and shortness of breath.    Cardiovascular:  Negative for chest pain and palpitations.   Gastrointestinal:  Negative for abdominal pain and vomiting.   Genitourinary:  Negative for dysuria and hematuria.   Musculoskeletal:  Positive for arthralgias, back pain, myalgias and neck pain.   Skin:  Negative for color change and rash.   Neurological:  Positive for headaches. Negative for seizures and syncope.   All other systems reviewed and are negative.      Past Medical History:   Diagnosis Date    Allergic rhinitis     GERD (gastroesophageal reflux disease)     No known health problems      Past Surgical History:   Procedure Laterality Date    NO PAST SURGERIES       Family History   Problem Relation Age of Onset    Diabetes Father     Coronary artery disease Mother     Breast cancer Neg Hx     Colon cancer Neg Hx     Ovarian cancer Neg Hx     Cancer Neg Hx      Social History     Socioeconomic History    Marital status: Single     Spouse name: None    Number of children: None    Years of education: None    Highest education level: None   Occupational History    None   Tobacco Use    Smoking status: Never     Passive exposure: Never    Smokeless tobacco: Never   Vaping Use    Vaping status: Never Used   Substance and Sexual Activity    Alcohol use: Yes     Comment:  couple times/year    Drug use: Never    Sexual activity: Not Currently     Partners: Male     Birth control/protection: None   Other Topics Concern    None   Social History Narrative    [employer]     Social Determinants of Health     Financial Resource Strain: Low Risk  (10/11/2023)    Overall Financial Resource Strain (CARDIA)     Difficulty of Paying Living Expenses: Not very hard   Food Insecurity: No Food Insecurity (10/11/2023)    Hunger Vital Sign     Worried About Running Out of Food in the Last Year: Never true     Ran Out of Food in the Last Year: Never true   Transportation Needs: No Transportation Needs (10/11/2023)    PRAPARE - Transportation     Lack of Transportation (Medical): No     Lack of Transportation (Non-Medical): No   Physical Activity: Not on file   Stress: Not on file   Social Connections: Not on file   Intimate Partner Violence: Not on file   Housing Stability: Not on file     Current Outpatient Medications on File Prior to Visit   Medication Sig    acetaminophen (TYLENOL) 650 mg CR tablet Take 1 tablet (650 mg total) by mouth every 8 (eight) hours as needed for mild pain    azelastine (ASTELIN) 0.1 % nasal spray 1 spray into each nostril 2 (two) times a day Use in each nostril as directed    fexofenadine (ALLEGRA) 180 MG tablet Take 1 tablet (180 mg total) by mouth daily    ibuprofen (MOTRIN) 600 mg tablet Take 1 tablet (600 mg total) by mouth every 6 (six) hours as needed for mild pain    mupirocin (BACTROBAN) 2 % ointment Apply topically 2 (two) times a day    omeprazole (PriLOSEC) 40 MG capsule TAKE ONE CAPSULE BY MOUTH EVERY DAY    [DISCONTINUED] methocarbamol (ROBAXIN) 500 mg tablet Take 1 tablet (500 mg total) by mouth 2 (two) times a day as needed for muscle spasms    sodium chloride (OCEAN) 0.65 % nasal spray 1 spray into each nostril as needed for congestion (Patient not taking: Reported on 1/11/2024)     Allergies   Allergen Reactions    Famotidine Rash     Immunization History    Administered Date(s) Administered    COVID-19 Pfizer Vac BIVALENT Bobo-sucrose 12 Yr+ IM 07/27/2023, 07/27/2023    DTaP 2001, 2001, 2001, 06/07/2002, 03/09/2005    HPV Quadrivalent 04/08/2015    HPV9 04/08/2015, 09/25/2019, 10/11/2023, 12/14/2023    Hep A, ped/adol, 2 dose 06/18/2008, 01/02/2009    Hep B / HiB 2001, 2001    Hep B, Adolescent or Pediatric 2001, 2001, 2001    Hepatitis A 06/18/2008, 01/02/2009    HiB 2001, 06/07/2002    INFLUENZA 12/01/2016    IPV 2001, 2001, 03/09/2005    Influenza, injectable, quadrivalent, preservative free 0.5 mL 09/25/2023    MMR 03/14/2002, 03/09/2005    Meningococcal Conjugate (MCV4O) 07/19/2012, 07/17/2018    Meningococcal MCV4, Unspecified 07/19/2012    Meningococcal MCV4P 07/19/2012    Pneumococcal Conjugate PCV 7 2001, 2001, 2001, 07/02/2004    Tdap 07/19/2012, 07/27/2023, 07/27/2023, 12/08/2023    Varicella 03/14/2002, 06/13/2007       Objective     /60 (BP Location: Right arm, Patient Position: Sitting, Cuff Size: Standard)   Pulse 85   Temp (!) 97.4 °F (36.3 °C) (Temporal)   Wt 121 kg (267 lb 12.8 oz)   LMP 12/13/2023 (Approximate)   SpO2 98%   BMI 41.94 kg/m²     Physical Exam  Vitals and nursing note reviewed.   Constitutional:       General: She is not in acute distress.     Appearance: She is well-developed. She is not diaphoretic.   HENT:      Head: Normocephalic and atraumatic.      Right Ear: External ear normal.      Left Ear: External ear normal.   Eyes:      Pupils: Pupils are equal, round, and reactive to light.   Cardiovascular:      Rate and Rhythm: Normal rate and regular rhythm.   Pulmonary:      Effort: Pulmonary effort is normal. No respiratory distress.      Breath sounds: Normal breath sounds. No wheezing.   Abdominal:      General: Bowel sounds are normal. There is no distension.      Palpations: Abdomen is soft.      Tenderness: There is no abdominal  tenderness. There is no guarding or rebound.   Musculoskeletal:         General: No deformity. Normal range of motion.      Cervical back: Normal range of motion and neck supple. Tenderness present.      Thoracic back: Tenderness present.      Comments: Bilateral thoracic TTP   Lymphadenopathy:      Cervical: No cervical adenopathy.   Skin:     General: Skin is warm and dry.      Capillary Refill: Capillary refill takes less than 2 seconds.      Findings: No rash.   Neurological:      General: No focal deficit present.      Mental Status: She is alert and oriented to person, place, and time.      Sensory: No sensory deficit.      Coordination: Coordination normal.      Deep Tendon Reflexes: Reflexes normal.   Psychiatric:         Behavior: Behavior normal.       CYRUS Montiel

## 2024-01-25 NOTE — PROGRESS NOTES
Name: Fransisca Angela      : 2000      MRN: [id number]  Encounter Provider: CYRUS Montiel  Encounter Date: 2024   Encounter department: [facility]    Assessment & Plan     1. Neck pain    2. Macromastia    3. Chronic bilateral thoracic back pain  -     lidocaine (Lidoderm) 5 %; Apply 1 patch topically over 12 hours daily Remove & Discard patch within 12 hours or as directed by MD    4. Class 3 severe obesity due to excess calories with body mass index (BMI) of 40.0 to 44.9 in adult, unspecified whether serious comorbidity present (HCC)  -     Ambulatory Referral to Weight Management; Future           Subjective     Back Pain  This is a chronic problem. The current episode started more than 1 year ago. The problem occurs constantly. The problem is unchanged. The pain is present in the thoracic spine. The quality of the pain is described as aching. The pain does not radiate. The pain is at a severity of 8/10. The pain is severe. The pain is Worse during the night. The symptoms are aggravated by bending, lying down, position and twisting. Stiffness is present All day. Associated symptoms include headaches. Risk factors include obesity and lack of exercise. She has tried NSAIDs and muscle relaxant for the symptoms. The treatment provided no relief.     Review of Systems   HENT:  Positive for rhinorrhea.    Eyes: Negative.    Respiratory: Negative.     Gastrointestinal: Negative.    Genitourinary: Negative.    Musculoskeletal:  Positive for back pain.   Neurological:  Positive for light-headedness and headaches.   Psychiatric/Behavioral: Negative.         Past Medical History:   Diagnosis Date    Allergic rhinitis     GERD (gastroesophageal reflux disease)     No known health problems      Past Surgical History:   Procedure Laterality Date    NO PAST SURGERIES       Family History   Problem Relation Age of Onset    Diabetes Father     Coronary artery disease  Mother     Breast cancer Neg Hx     Colon cancer Neg Hx     Ovarian cancer Neg Hx     Cancer Neg Hx      Social History     Socioeconomic History    Marital status: Single     Spouse name: None    Number of children: None    Years of education: None    Highest education level: None   Occupational History    None   Tobacco Use    Smoking status: Never     Passive exposure: Never    Smokeless tobacco: Never   Vaping Use    Vaping status: Never Used   Substance and Sexual Activity    Alcohol use: Yes     Comment: couple times/year    Drug use: Never    Sexual activity: Not Currently     Partners: Male     Birth control/protection: None   Other Topics Concern    None   Social History Narrative    [employer]     Social Determinants of Health     Financial Resource Strain: Low Risk  (10/11/2023)    Overall Financial Resource Strain (CARDIA)     Difficulty of Paying Living Expenses: Not very hard   Food Insecurity: No Food Insecurity (10/11/2023)    Hunger Vital Sign     Worried About Running Out of Food in the Last Year: Never true     Ran Out of Food in the Last Year: Never true   Transportation Needs: No Transportation Needs (10/11/2023)    PRAPARE - Transportation     Lack of Transportation (Medical): No     Lack of Transportation (Non-Medical): No   Physical Activity: Not on file   Stress: Not on file   Social Connections: Not on file   Intimate Partner Violence: Not on file   Housing Stability: Not on file     Current Outpatient Medications on File Prior to Visit   Medication Sig    acetaminophen (TYLENOL) 650 mg CR tablet Take 1 tablet (650 mg total) by mouth every 8 (eight) hours as needed for mild pain    azelastine (ASTELIN) 0.1 % nasal spray 1 spray into each nostril 2 (two) times a day Use in each nostril as directed    fexofenadine (ALLEGRA) 180 MG tablet Take 1 tablet (180 mg total) by mouth daily    ibuprofen (MOTRIN) 600 mg tablet Take 1 tablet (600 mg total) by mouth every 6 (six) hours as needed for mild  pain    mupirocin (BACTROBAN) 2 % ointment Apply topically 2 (two) times a day    omeprazole (PriLOSEC) 40 MG capsule TAKE ONE CAPSULE BY MOUTH EVERY DAY    [DISCONTINUED] methocarbamol (ROBAXIN) 500 mg tablet Take 1 tablet (500 mg total) by mouth 2 (two) times a day as needed for muscle spasms    sodium chloride (OCEAN) 0.65 % nasal spray 1 spray into each nostril as needed for congestion (Patient not taking: Reported on 1/11/2024)     Allergies   Allergen Reactions    Famotidine Rash     Immunization History   Administered Date(s) Administered    COVID-19 Pfizer Vac BIVALENT Bobo-sucrose 12 Yr+ IM 07/27/2023, 07/27/2023    DTaP 2001, 2001, 2001, 06/07/2002, 03/09/2005    HPV Quadrivalent 04/08/2015    HPV9 04/08/2015, 09/25/2019, 10/11/2023, 12/14/2023    Hep A, ped/adol, 2 dose 06/18/2008, 01/02/2009    Hep B / HiB 2001, 2001    Hep B, Adolescent or Pediatric 2001, 2001, 2001    Hepatitis A 06/18/2008, 01/02/2009    HiB 2001, 06/07/2002    INFLUENZA 12/01/2016    IPV 2001, 2001, 03/09/2005    Influenza, injectable, quadrivalent, preservative free 0.5 mL 09/25/2023    MMR 03/14/2002, 03/09/2005    Meningococcal Conjugate (MCV4O) 07/19/2012, 07/17/2018    Meningococcal MCV4, Unspecified 07/19/2012    Meningococcal MCV4P 07/19/2012    Pneumococcal Conjugate PCV 7 2001, 2001, 2001, 07/02/2004    Tdap 07/19/2012, 07/27/2023, 07/27/2023, 12/08/2023    Varicella 03/14/2002, 06/13/2007       Objective     /60 (BP Location: Right arm, Patient Position: Sitting, Cuff Size: Standard)   Pulse 85   Temp (!) 97.4 °F (36.3 °C) (Temporal)   Wt 121 kg (267 lb 12.8 oz)   LMP 12/13/2023 (Approximate)   SpO2 98%   BMI 41.94 kg/m²     Physical Exam  Constitutional:       Appearance: Normal appearance.   HENT:      Head: Normocephalic.      Right Ear: Tympanic membrane normal.      Left Ear: Tympanic membrane normal.      Nose: Nose normal.  No rhinorrhea.   Cardiovascular:      Rate and Rhythm: Normal rate and regular rhythm.      Pulses: Normal pulses.   Pulmonary:      Effort: Pulmonary effort is normal.      Breath sounds: Normal breath sounds.   Musculoskeletal:         General: Normal range of motion.      Cervical back: Rigidity and tenderness present.   Neurological:      General: No focal deficit present.   Psychiatric:         Mood and Affect: Mood normal.         Behavior: Behavior normal.       CYRUS Montiel

## 2024-01-25 NOTE — PATIENT INSTRUCTIONS
Meal Planning with the Plate Method   AMBULATORY CARE:   Meal planning with the plate method  is a way for people with diabetes to plan meals. The plate method can help you eat the right amount of carbohydrates and keep your blood sugar levels under control. Carbohydrates naturally raise your blood sugar level. Your blood sugar level can rise too high if you eat too much carbohydrate at one time. Carbohydrates are found in starches (bread, cereal, starchy vegetables, and beans), fruit, milk, yogurt, and sweets.  How to use the plate method to plan meals:   Draw an imaginary line down the middle of a 9-inch dinner plate.  On one side, draw another line to divide that section in half. Your plate will have 3 sections.    Fill the largest section of your plate with non-starchy vegetables.  These include broccoli, spinach, cucumbers, peppers, cauliflower, and tomatoes.    Add a carbohydrate to 1 of the small sections of your plate.  Examples are pasta, rice, whole-grain bread, tortillas, corn, potatoes, and beans. Your plan may allow a serving of low-fat dairy or fruit for a carbohydrate.    Add meat or another source of protein to the other small section of your plate.  Examples include chicken or turkey without skin, fish, lean beef or pork, low-fat cheese, tofu, or eggs.         Add a low-calorie or calorie-free drink.  Examples include water or unsweetened tea or coffee.       Serving sizes of foods:   Non-starchy vegetables:      ½ cup of cooked vegetables or 1 cup of raw vegetables    ½ cup of vegetable juice    Starches:      1 ounce of whole-wheat bread or 1 small (6 inch) flour or corn tortilla    1 small (4 inch) pancake (about ¼ inch thick)    ¾ cup of dry, unsweetened, whole-grain ready-to-eat cereal or ¼ cup of low-fat granola    ½ cup of cooked cereal or oatmeal    ? cup of rice or pasta    ½ cup of corn, green peas, sweet potatoes, or mashed potatoes    ½ cup of cooked beans and peas (todd davey,  kidney, white, split, black-eyed)    Meat and other protein sources:      3 to 4 ounces of any lean meat, fish, or poultry    ½ cup of tofu or tempeh    1 large egg    1½ ounces (about 2 tablespoons) of nuts or 2 tablespoons of peanut butter    Fruit:      1 small piece of fresh fruit     ½ cup of canned or fresh fruit or unsweetened fruit juice    ¼ cup of dried fruit    Milk and yogurt:      1 cup (8 ounces) of skim or 1% milk    ¾ cup (6 ounces) of plain, non-fat yogurt    Other healthy nutrition tips:   Limit salt and sugar.  Choose and prepare foods and drinks with less salt and added sugars. Use the nutrition information on food labels to help you make healthy choices. The percent daily value listed on the food label tells you whether a food is low or high in certain nutrients. A percent daily value of 5% or less means that the food is low in a nutrient. A percent daily value of 20% or more means that the food is high in a nutrient.         Choose healthy fats.  Choose healthy fats such as polyunsaturated and monounsaturated fats in place of unhealthy fats. Healthy fats are found in vegetable oils such as soybean, corn, canola, olive, and sunflower oil. Unhealthy fats are saturated fats, trans fats, and cholesterol. Unhealthy fats are found in shortening, butter, stick margarine, and animal fat.         Ask your healthcare provider if alcohol is okay for you.  Generally, men 65 or older and women should limit alcohol to 1 drink within 24 hours and 7 within 1 week. Men 21 to 64 years should limit alcohol to 2 drinks a day and 14 within 1 week. Your provider can tell you how many drinks are okay for you within 24 hours or within 1 week. A drink of alcohol is 12 ounces of beer, 5 ounces of wine, or 1½ ounces of liquor. Always have food when you drink alcohol. Your blood sugar may fall to a low level if you drink when your stomach is empty.    © Copyright [employer] 2023 Information is for End User's use only and  may not be sold, redistributed or otherwise used for commercial purposes.  The above information is an  only. It is not intended as medical advice for individual conditions or treatments. Talk to your doctor, nurse or pharmacist before following any medical regimen to see if it is safe and effective for you.

## 2024-01-25 NOTE — ASSESSMENT & PLAN NOTE
Patient had consult with plastics, due to high BMI she would have better outcomes if she loses weight prior to having a reduction

## 2024-01-25 NOTE — ASSESSMENT & PLAN NOTE
Wt Readings from Last 3 Encounters:   01/25/24 121 kg (267 lb 12.8 oz)   01/15/24 118 kg (260 lb)   01/11/24 119 kg (263 lb)     Referral to weight management   -Encouraged diet and lifestyle changes: decrease processed foods (cakes, cookies, chips, soda), decrease total carbohydrate intake, decrease fried/fatty foods, increase fruits and vegetables, increase lean proteins (chicken, turkey), increase healthy fats (avocado, fish, nuts), drink plenty of water (at least four 16 oz bottles per day)

## 2024-02-14 ENCOUNTER — TELEPHONE (OUTPATIENT)
Dept: PLASTIC SURGERY | Facility: CLINIC | Age: 24
End: 2024-02-14

## 2024-02-14 ENCOUNTER — PREP FOR PROCEDURE (OUTPATIENT)
Dept: PLASTIC SURGERY | Facility: CLINIC | Age: 24
End: 2024-02-14

## 2024-02-14 DIAGNOSIS — M54.9 DORSALGIA: ICD-10-CM

## 2024-02-14 DIAGNOSIS — N62 MACROMASTIA: Primary | ICD-10-CM

## 2024-03-15 ENCOUNTER — OFFICE VISIT (OUTPATIENT)
Dept: PLASTIC SURGERY | Facility: CLINIC | Age: 24
End: 2024-03-15
Payer: COMMERCIAL

## 2024-03-15 VITALS
HEART RATE: 90 BPM | TEMPERATURE: 97.2 F | HEIGHT: 68 IN | BODY MASS INDEX: 40.5 KG/M2 | WEIGHT: 267.25 LBS | SYSTOLIC BLOOD PRESSURE: 115 MMHG | DIASTOLIC BLOOD PRESSURE: 86 MMHG

## 2024-03-15 DIAGNOSIS — N62 MACROMASTIA: Primary | ICD-10-CM

## 2024-03-15 PROCEDURE — 99214 OFFICE O/P EST MOD 30 MIN: CPT | Performed by: STUDENT IN AN ORGANIZED HEALTH CARE EDUCATION/TRAINING PROGRAM

## 2024-03-15 RX ORDER — DOCUSATE SODIUM 100 MG/1
100 CAPSULE, LIQUID FILLED ORAL 2 TIMES DAILY PRN
Qty: 20 CAPSULE | Refills: 0 | Status: SHIPPED | OUTPATIENT
Start: 2024-03-15

## 2024-03-15 RX ORDER — ONDANSETRON 4 MG/1
4 TABLET, FILM COATED ORAL EVERY 8 HOURS PRN
Qty: 20 TABLET | Refills: 0 | Status: SHIPPED | OUTPATIENT
Start: 2024-03-15

## 2024-03-15 RX ORDER — OXYCODONE HYDROCHLORIDE 5 MG/1
5 TABLET ORAL EVERY 4 HOURS PRN
Qty: 30 TABLET | Refills: 0 | Status: SHIPPED | OUTPATIENT
Start: 2024-03-15

## 2024-03-15 RX ORDER — ACETAMINOPHEN 500 MG
500 TABLET ORAL EVERY 4 HOURS PRN
Qty: 30 TABLET | Refills: 2 | Status: SHIPPED | OUTPATIENT
Start: 2024-03-15

## 2024-03-15 NOTE — PROGRESS NOTES
Plastic Surgery Consult     Reason for visit: preop BBR    \[facility] [place] [facility]\"" 3/15/24  Patient presents for preop for BBR    Discussed again the risks, benefits, procedure, and complications of breast reduction including but not limited to infection, bleeding, scarring, asymmetry, abscess, delayed wound healing, wound dehiscence, contour deformity, partial vs complete nipple necrosis, fat necrosis, changes in nipple sensation, changes in breast feeding ability. If patient were to become pregnant, there is a risk of increase in breast size as well.    Also discussed increased risk of all complications given her severely elevated BMI of 40.6. Due to the ptotic nature of her breasts, also discussed possibility of free nipple graft if needed.    Patient states that she does not have nipple sensation in either breast.    Patient current wears DD, would like C cup, something that fits her body type. Estimated resection is at least 500-650 g per side.    BMI: 40  BSA: 2.28     Breast Exam:  Bilateral large ptotic breasts, with severe grade III ptosis bilaterally  Enlarged areolas  Bilateral shoulder grooving  No masses, skin dimpling, or discharge  No nipple sensation bilaterally        Measurements:                          R                      L  SN-N               40 cm              38 cm  N-IMF              14 cm              14 cm    Patient acknowledged. All questions answered, concerns addressed.    Consents obtained, photos taken.    Prescriptions sent.  Tumescent for blood loss control  Surgery scheduled  -Spent 35 minutes in consultation with patient. Greater than 50% of the total time was spent obtaining history, evaluation, performing exam, discussion of management options including post-operative care, answering patient's questions and concerns, chart reviewing, and documentation      Donnie Amezquita MD   [facility] Plastic and Reconstructive Surgery   [address], [address]   [place] [address]   Office:  "[phone]       HPI from 1/11/24  Patient is a 22 y/o female who presents with symptomatic macromastia. She complains of large, heavy, pendulous breasts that causes her chest, upper back, neck, and shoulder discomfort. She also complains of rashes underneath her breast during humid weather and has difficulty exercising. She has tried conservative therapy including supportive bra, ibuprofen, and creams/powders under her breasts with minimal symptomatic improvement. She denies lumps or discharge on self-exams.     She currently wears DDish and would like to be \"B-C\" cup ideally.     Mammogram status: none     Plans to have more children: maybe     Physical therapy attempted: completed        ROS: 12 pt ROS negative, except as otherwise noted in HPI.     PMH: none  FamHx: non-contrib  SurgHx: wisdom teeth  SocHx: no tobacco,no smoking +social ETOH  Meds: no blood thinners, no steroids  Allergies: famotidine     PE:         Vitals:     01/11/24 1456   BP: 106/78   Pulse: 87   Temp: 97.7 °F (36.5 °C)         General: NC/AT, breathing comfortably on RA  Neuro: CN II-XII grossly intact, symmetric reflexes  HEENT: PERRLA, EOMI, external ears normal, no lesions or deformities, neck supple, trachea midline  Respiratory: CTAB, normal respiratory effort  Cardio: RRR, normal S1, S2, no murmur, rubs, gallops  GI: soft, non-tender, non-distended  MSK: normal alignment, mobility, gait  Skin: no rashes, lesions, subcutaneous nodules        BMI: 40  BSA: 2.28     Breast Exam:  Bilateral large ptotic breasts, with severe grade III ptosis bilaterally  Enlarged areolas  Bilateral shoulder grooving  No masses, skin dimpling, or discharge  Diminished nipple sensation bilaterally        Measurements:                          R                      L  SN-N               40 cm              38 cm  N-IMF              14 cm              14 cm        Labs: none     Imaging: none     A/P: 22 y/o female with symptomatic macromastia.   "   -Patient would benefit from bilateral reduction mammoplasty. Technique: wise-pattern with inferior pedicle with estimated resection weight of 500-650 g per side. Patient's elevated BSA is partly attributable to her height as she is 5 foot 8 inches. Any greater resection would make her flat chested.  -Discussed with patient the risks, benefits, procedure, and complications. Discussed changes/loss/hypersentivity in nipple sensation, diminished breast feeding ability and increase in size of breasts should she become pregnant. Discussed risk of partial vs complete nipple loss due to vascular issues. Discussed that breast reduction will help but may not completely resolve her back, chest, upper neck, and shoulder pain. Due to patient's obesity, I discussed the increased risk of surgical complications including infection, seroma, fat necrosis, abscess, wound dehisence. All of these risks increase with increasing BMI. Due to her large pendulous breasts, the possibility of free nipple graft was also discussed.   -Re-iterated the increased risk of complications due to her elevated BMI of 40  -Patient's questions answered, concerns addressed.  -Photos taken, will submit to insurance  -Spent 45 minutes in consultation with patient. Greater than 50% of the total time was spent obtaining history, evaluation, performing exam, discussion of management options including post-operative care, answering patient's questions and concerns, chart reviewing, and documentation        Donnie Amezquita MD   [facility] Plastic and Reconstructive Surgery   [address] [place] [address], [address]   [place] [address]   Office: [phone]

## 2024-03-25 ENCOUNTER — APPOINTMENT (OUTPATIENT)
Dept: LAB | Facility: HOSPITAL | Age: 24
End: 2024-03-25
Payer: COMMERCIAL

## 2024-03-25 DIAGNOSIS — M54.9 DORSALGIA: ICD-10-CM

## 2024-03-25 DIAGNOSIS — N62 MACROMASTIA: ICD-10-CM

## 2024-03-25 LAB
ANION GAP SERPL CALCULATED.3IONS-SCNC: 7 MMOL/L (ref 4–13)
BASOPHILS # BLD AUTO: 0.02 THOUSANDS/ÂΜL (ref 0–0.1)
BASOPHILS NFR BLD AUTO: 0 % (ref 0–1)
BUN SERPL-MCNC: 10 MG/DL (ref 5–25)
CALCIUM SERPL-MCNC: 9.8 MG/DL (ref 8.4–10.2)
CHLORIDE SERPL-SCNC: 103 MMOL/L (ref 96–108)
CO2 SERPL-SCNC: 29 MMOL/L (ref 21–32)
CREAT SERPL-MCNC: 0.72 MG/DL (ref 0.6–1.3)
EOSINOPHIL # BLD AUTO: 0.12 THOUSAND/ÂΜL (ref 0–0.61)
EOSINOPHIL NFR BLD AUTO: 2 % (ref 0–6)
ERYTHROCYTE [DISTWIDTH] IN BLOOD BY AUTOMATED COUNT: 13.1 % (ref 11.6–15.1)
GFR SERPL CREATININE-BSD FRML MDRD: 118 ML/MIN/1.73SQ M
GLUCOSE P FAST SERPL-MCNC: 82 MG/DL (ref 65–99)
HCT VFR BLD AUTO: 43.2 % (ref 34.8–46.1)
HGB BLD-MCNC: 13.1 G/DL (ref 11.5–15.4)
IMM GRANULOCYTES # BLD AUTO: 0.03 THOUSAND/UL (ref 0–0.2)
IMM GRANULOCYTES NFR BLD AUTO: 0 % (ref 0–2)
LYMPHOCYTES # BLD AUTO: 2.48 THOUSANDS/ÂΜL (ref 0.6–4.47)
LYMPHOCYTES NFR BLD AUTO: 34 % (ref 14–44)
MCH RBC QN AUTO: 26 PG (ref 26.8–34.3)
MCHC RBC AUTO-ENTMCNC: 30.3 G/DL (ref 31.4–37.4)
MCV RBC AUTO: 86 FL (ref 82–98)
MONOCYTES # BLD AUTO: 0.53 THOUSAND/ÂΜL (ref 0.17–1.22)
MONOCYTES NFR BLD AUTO: 7 % (ref 4–12)
NEUTROPHILS # BLD AUTO: 4.1 THOUSANDS/ÂΜL (ref 1.85–7.62)
NEUTS SEG NFR BLD AUTO: 57 % (ref 43–75)
NRBC BLD AUTO-RTO: 0 /100 WBCS
PLATELET # BLD AUTO: 295 THOUSANDS/UL (ref 149–390)
PMV BLD AUTO: 9.9 FL (ref 8.9–12.7)
POTASSIUM SERPL-SCNC: 4.7 MMOL/L (ref 3.5–5.3)
RBC # BLD AUTO: 5.04 MILLION/UL (ref 3.81–5.12)
SODIUM SERPL-SCNC: 139 MMOL/L (ref 135–147)
WBC # BLD AUTO: 7.28 THOUSAND/UL (ref 4.31–10.16)

## 2024-03-25 PROCEDURE — 80048 BASIC METABOLIC PNL TOTAL CA: CPT

## 2024-03-25 PROCEDURE — 85025 COMPLETE CBC W/AUTO DIFF WBC: CPT

## 2024-03-25 PROCEDURE — 36415 COLL VENOUS BLD VENIPUNCTURE: CPT

## 2024-04-02 NOTE — PRE-PROCEDURE INSTRUCTIONS
Pre-Surgery Instructions:   Medication Instructions    acetaminophen (TYLENOL) 500 mg tablet Uses PRN- OK to take day of surgery- if needed may take DOS with sip of water     CRANBERRY PO Stop taking 7 days prior to surgery.    fexofenadine (ALLEGRA) 180 MG tablet Uses PRN- OK to take day of surgery- if needed may take DOS with sip of water     omeprazole (PriLOSEC) 40 MG capsule Take day of surgery. With sip of water     Pt has cleanser and instructions for showering both night before and DOS - pt instructed on showering - pt verbalized understanding of instructions given.    Pt instructed no hair or body products on skin at this time   Pt instructed no razor blade shaving within one week prior to surgery - ok to use electric razor up till 4/8 - no shaving 4/9 or 4/10.  Pt instructed if with any health status changes between now and DOS - notify surgeon office.  Pt has medications prescribed by Dr Amezquita for post op use if needed.    Medication instructions for day surgery reviewed. Please use only a sip of water to take your instructed medications. Avoid all over the counter vitamins, supplements and NSAIDS for one week prior to surgery per anesthesia guidelines. Tylenol is ok to take as needed.     You will receive a call one business day prior to surgery with an arrival time and hospital directions. If your surgery is scheduled on a Monday, the hospital will be calling you on the Friday prior to your surgery. If you have not heard from anyone by 8pm, please call the hospital supervisor through the hospital  at [phone]. ([facility] [phone] or [place] [facility] [phone]).    Do not eat or drink anything after midnight the night before your surgery, including candy, mints, lifesavers, or chewing gum. Do not drink alcohol 24hrs before your surgery. Try not to smoke at least 24hrs before your surgery.       Follow the pre surgery showering instructions as listed in the “My Surgical Experience Booklet” or  otherwise provided by your surgeon's office. Do not use a blade to shave the surgical area 1 week before surgery. It is okay to use a clean electric clippers up to 24 hours before surgery. Do not apply any lotions, creams, including makeup, cologne, deodorant, or perfumes after showering on the day of your surgery. Do not use dry shampoo, hair spray, hair gel, or any type of hair products.     No contact lenses, eye make-up, or artificial eyelashes. Remove nail polish, including gel polish, and any artificial, gel, or acrylic nails if possible. Remove all jewelry including rings and body piercing jewelry.     Wear causal clothing that is easy to take on and off. Consider your type of surgery.    Keep any valuables, jewelry, piercings at home. Please bring any specially ordered equipment (sling, braces) if indicated.    Arrange for a responsible person to drive you to and from the hospital on the day of your surgery. Please confirm the visitor policy for the day of your procedure when you receive your phone call with an arrival time.     Call the surgeon's office with any new illnesses, exposures, or additional questions prior to surgery.    Please reference your “My Surgical Experience Booklet” for additional information to prepare for your upcoming surgery.

## 2024-04-10 ENCOUNTER — ANESTHESIA (OUTPATIENT)
Dept: PERIOP | Facility: HOSPITAL | Age: 24
End: 2024-04-10
Payer: COMMERCIAL

## 2024-04-10 ENCOUNTER — HOSPITAL ENCOUNTER (OUTPATIENT)
Facility: HOSPITAL | Age: 24
Setting detail: OUTPATIENT SURGERY
Discharge: HOME | End: 2024-04-10
Attending: STUDENT IN AN ORGANIZED HEALTH CARE EDUCATION/TRAINING PROGRAM | Admitting: STUDENT IN AN ORGANIZED HEALTH CARE EDUCATION/TRAINING PROGRAM
Payer: COMMERCIAL

## 2024-04-10 ENCOUNTER — ANESTHESIA EVENT (OUTPATIENT)
Dept: PERIOP | Facility: HOSPITAL | Age: 24
End: 2024-04-10
Payer: COMMERCIAL

## 2024-04-10 VITALS
HEART RATE: 55 BPM | RESPIRATION RATE: 18 BRPM | TEMPERATURE: 97.1 F | OXYGEN SATURATION: 98 % | HEIGHT: 68 IN | BODY MASS INDEX: 40.16 KG/M2 | WEIGHT: 265 LBS | DIASTOLIC BLOOD PRESSURE: 53 MMHG | SYSTOLIC BLOOD PRESSURE: 98 MMHG

## 2024-04-10 DIAGNOSIS — N62 MACROMASTIA: ICD-10-CM

## 2024-04-10 DIAGNOSIS — M54.9 DORSALGIA: ICD-10-CM

## 2024-04-10 LAB
EXT PREGNANCY TEST URINE: NEGATIVE
EXT. CONTROL: NORMAL

## 2024-04-10 PROCEDURE — 19318 BREAST REDUCTION: CPT | Performed by: STUDENT IN AN ORGANIZED HEALTH CARE EDUCATION/TRAINING PROGRAM

## 2024-04-10 PROCEDURE — 88305 TISSUE EXAM BY PATHOLOGIST: CPT | Performed by: PATHOLOGY

## 2024-04-10 PROCEDURE — 81025 URINE PREGNANCY TEST: CPT | Performed by: STUDENT IN AN ORGANIZED HEALTH CARE EDUCATION/TRAINING PROGRAM

## 2024-04-10 PROCEDURE — NC001 PR NO CHARGE: Performed by: STUDENT IN AN ORGANIZED HEALTH CARE EDUCATION/TRAINING PROGRAM

## 2024-04-10 PROCEDURE — C9290 INJ, BUPIVACAINE LIPOSOME: HCPCS | Performed by: STUDENT IN AN ORGANIZED HEALTH CARE EDUCATION/TRAINING PROGRAM

## 2024-04-10 RX ORDER — MIDAZOLAM HYDROCHLORIDE 2 MG/2ML
INJECTION, SOLUTION INTRAMUSCULAR; INTRAVENOUS AS NEEDED
Status: DISCONTINUED | OUTPATIENT
Start: 2024-04-10 | End: 2024-04-10

## 2024-04-10 RX ORDER — PROPOFOL 10 MG/ML
INJECTION, EMULSION INTRAVENOUS CONTINUOUS PRN
Status: DISCONTINUED | OUTPATIENT
Start: 2024-04-10 | End: 2024-04-10

## 2024-04-10 RX ORDER — NEOSTIGMINE METHYLSULFATE 1 MG/ML
INJECTION INTRAVENOUS AS NEEDED
Status: DISCONTINUED | OUTPATIENT
Start: 2024-04-10 | End: 2024-04-10

## 2024-04-10 RX ORDER — SODIUM CHLORIDE, SODIUM LACTATE, POTASSIUM CHLORIDE, CALCIUM CHLORIDE 600; 310; 30; 20 MG/100ML; MG/100ML; MG/100ML; MG/100ML
125 INJECTION, SOLUTION INTRAVENOUS CONTINUOUS
Status: DISCONTINUED | OUTPATIENT
Start: 2024-04-10 | End: 2024-04-10 | Stop reason: HOSPADM

## 2024-04-10 RX ORDER — OXYCODONE HYDROCHLORIDE 10 MG/1
5 TABLET ORAL EVERY 4 HOURS PRN
Status: CANCELLED | OUTPATIENT
Start: 2024-04-10

## 2024-04-10 RX ORDER — PROMETHAZINE HYDROCHLORIDE 25 MG/ML
12.5 INJECTION, SOLUTION INTRAMUSCULAR; INTRAVENOUS ONCE AS NEEDED
Status: DISCONTINUED | OUTPATIENT
Start: 2024-04-10 | End: 2024-04-10 | Stop reason: HOSPADM

## 2024-04-10 RX ORDER — FENTANYL CITRATE 50 UG/ML
INJECTION, SOLUTION INTRAMUSCULAR; INTRAVENOUS AS NEEDED
Status: DISCONTINUED | OUTPATIENT
Start: 2024-04-10 | End: 2024-04-10

## 2024-04-10 RX ORDER — ROCURONIUM BROMIDE 10 MG/ML
INJECTION, SOLUTION INTRAVENOUS AS NEEDED
Status: DISCONTINUED | OUTPATIENT
Start: 2024-04-10 | End: 2024-04-10

## 2024-04-10 RX ORDER — CEFAZOLIN SODIUM 1 G/3ML
INJECTION, POWDER, FOR SOLUTION INTRAMUSCULAR; INTRAVENOUS AS NEEDED
Status: DISCONTINUED | OUTPATIENT
Start: 2024-04-10 | End: 2024-04-10

## 2024-04-10 RX ORDER — LIDOCAINE HYDROCHLORIDE 10 MG/ML
INJECTION, SOLUTION EPIDURAL; INFILTRATION; INTRACAUDAL; PERINEURAL AS NEEDED
Status: DISCONTINUED | OUTPATIENT
Start: 2024-04-10 | End: 2024-04-10

## 2024-04-10 RX ORDER — DEXAMETHASONE SODIUM PHOSPHATE 10 MG/ML
INJECTION, SOLUTION INTRAMUSCULAR; INTRAVENOUS AS NEEDED
Status: DISCONTINUED | OUTPATIENT
Start: 2024-04-10 | End: 2024-04-10

## 2024-04-10 RX ORDER — HYDROMORPHONE HCL IN WATER/PF 6 MG/30 ML
0.2 PATIENT CONTROLLED ANALGESIA SYRINGE INTRAVENOUS
Status: DISCONTINUED | OUTPATIENT
Start: 2024-04-10 | End: 2024-04-10 | Stop reason: HOSPADM

## 2024-04-10 RX ORDER — GABAPENTIN 300 MG/1
300 CAPSULE ORAL ONCE
Status: COMPLETED | OUTPATIENT
Start: 2024-04-10 | End: 2024-04-10

## 2024-04-10 RX ORDER — GLYCOPYRROLATE 0.2 MG/ML
INJECTION INTRAMUSCULAR; INTRAVENOUS AS NEEDED
Status: DISCONTINUED | OUTPATIENT
Start: 2024-04-10 | End: 2024-04-10

## 2024-04-10 RX ORDER — FENTANYL CITRATE/PF 50 MCG/ML
25 SYRINGE (ML) INJECTION
Status: DISCONTINUED | OUTPATIENT
Start: 2024-04-10 | End: 2024-04-10 | Stop reason: HOSPADM

## 2024-04-10 RX ORDER — ACETAMINOPHEN 325 MG/1
975 TABLET ORAL ONCE
Status: COMPLETED | OUTPATIENT
Start: 2024-04-10 | End: 2024-04-10

## 2024-04-10 RX ORDER — ONDANSETRON 2 MG/ML
INJECTION INTRAMUSCULAR; INTRAVENOUS AS NEEDED
Status: DISCONTINUED | OUTPATIENT
Start: 2024-04-10 | End: 2024-04-10

## 2024-04-10 RX ORDER — ONDANSETRON 2 MG/ML
4 INJECTION INTRAMUSCULAR; INTRAVENOUS ONCE AS NEEDED
Status: DISCONTINUED | OUTPATIENT
Start: 2024-04-10 | End: 2024-04-10 | Stop reason: HOSPADM

## 2024-04-10 RX ORDER — HYDROMORPHONE HYDROCHLORIDE 1 MG/ML
INJECTION, SOLUTION INTRAMUSCULAR; INTRAVENOUS; SUBCUTANEOUS AS NEEDED
Status: DISCONTINUED | OUTPATIENT
Start: 2024-04-10 | End: 2024-04-10

## 2024-04-10 RX ADMIN — DEXMEDETOMIDINE HYDROCHLORIDE 0.2 MCG/KG/HR: 100 INJECTION, SOLUTION INTRAVENOUS at 11:37

## 2024-04-10 RX ADMIN — CEFAZOLIN 3000 MG: 1 INJECTION, POWDER, FOR SOLUTION INTRAMUSCULAR; INTRAVENOUS; PARENTERAL at 11:29

## 2024-04-10 RX ADMIN — HYDROMORPHONE HYDROCHLORIDE 0.5 MG: 1 INJECTION, SOLUTION INTRAMUSCULAR; INTRAVENOUS; SUBCUTANEOUS at 14:04

## 2024-04-10 RX ADMIN — CEFAZOLIN 2000 MG: 1 INJECTION, POWDER, FOR SOLUTION INTRAMUSCULAR; INTRAVENOUS at 15:28

## 2024-04-10 RX ADMIN — SODIUM CHLORIDE, SODIUM LACTATE, POTASSIUM CHLORIDE, AND CALCIUM CHLORIDE: .6; .31; .03; .02 INJECTION, SOLUTION INTRAVENOUS at 12:38

## 2024-04-10 RX ADMIN — PROPOFOL 120 MCG/KG/MIN: 10 INJECTION, EMULSION INTRAVENOUS at 11:38

## 2024-04-10 RX ADMIN — ONDANSETRON 4 MG: 2 INJECTION INTRAMUSCULAR; INTRAVENOUS at 11:29

## 2024-04-10 RX ADMIN — ACETAMINOPHEN 975 MG: 325 TABLET ORAL at 09:41

## 2024-04-10 RX ADMIN — ROCURONIUM BROMIDE 40 MG: 10 INJECTION, SOLUTION INTRAVENOUS at 11:38

## 2024-04-10 RX ADMIN — NEOSTIGMINE METHYLSULFATE 3 MG: 1 INJECTION INTRAVENOUS at 16:09

## 2024-04-10 RX ADMIN — PROPOFOL 200 MG: 10 INJECTION, EMULSION INTRAVENOUS at 11:37

## 2024-04-10 RX ADMIN — FENTANYL CITRATE 50 MCG: 50 INJECTION, SOLUTION INTRAMUSCULAR; INTRAVENOUS at 12:54

## 2024-04-10 RX ADMIN — HYDROMORPHONE HYDROCHLORIDE 0.2 MG: 0.2 INJECTION, SOLUTION INTRAMUSCULAR; INTRAVENOUS; SUBCUTANEOUS at 16:52

## 2024-04-10 RX ADMIN — SODIUM CHLORIDE, SODIUM LACTATE, POTASSIUM CHLORIDE, AND CALCIUM CHLORIDE: .6; .31; .03; .02 INJECTION, SOLUTION INTRAVENOUS at 11:10

## 2024-04-10 RX ADMIN — MIDAZOLAM 2 MG: 1 INJECTION INTRAMUSCULAR; INTRAVENOUS at 11:29

## 2024-04-10 RX ADMIN — ROCURONIUM BROMIDE 10 MG: 10 INJECTION, SOLUTION INTRAVENOUS at 14:11

## 2024-04-10 RX ADMIN — GABAPENTIN 300 MG: 300 CAPSULE ORAL at 09:41

## 2024-04-10 RX ADMIN — LIDOCAINE HYDROCHLORIDE 50 MG: 10 INJECTION, SOLUTION EPIDURAL; INFILTRATION; INTRACAUDAL; PERINEURAL at 11:37

## 2024-04-10 RX ADMIN — DEXAMETHASONE SODIUM PHOSPHATE 10 MG: 10 INJECTION, SOLUTION INTRAMUSCULAR; INTRAVENOUS at 11:45

## 2024-04-10 RX ADMIN — HYDROMORPHONE HYDROCHLORIDE 0.5 MG: 1 INJECTION, SOLUTION INTRAMUSCULAR; INTRAVENOUS; SUBCUTANEOUS at 12:10

## 2024-04-10 RX ADMIN — FENTANYL CITRATE 50 MCG: 50 INJECTION, SOLUTION INTRAMUSCULAR; INTRAVENOUS at 11:37

## 2024-04-10 RX ADMIN — GLYCOPYRROLATE 0.4 MG: 0.2 INJECTION, SOLUTION INTRAMUSCULAR; INTRAVENOUS at 16:09

## 2024-04-10 NOTE — DISCHARGE INSTR - AVS FIRST PAGE
Discharge Instructions    -Take tylenol 500 mg as scheduled for pain. For severe breakthrough pain, take oxycodone 5 mg as scheduled.  -Do not drive or operate heavy machinery when taking narcotic pain medicine as it can cause drowsiness.  -No showering for 48 hours. After 48 hours, can remove all dressings on breast (except sticky surgical tape along incisions) and shower.   -No baths, hottubs, pools, or soaking incision.  -After shower, pat incisions dry. Dress incisions with gauze. Incision may ooze for first few days, this is normal. Dressing may need to be changed more frequently if this occurs.  -Supportive bra at all times, either surgical bra or sports bra. No underwire.  -No strenuous activity, no heavy lifting, (nothing over 5 lbs), no reaching above shoulder or head as this can pull on incisions.  -Breasts will be swollen and bruised and incisions may ooze for first few days.  -Resume all home medications  -Resume regular diet  -Call [facility]astic [facility] to make follow-up appointment with physician assistant in 7-10 days.    Donnie Amezquita MD   [facility] Plastic and Reconstructive Surgery   [address], [address]   [place] [address]   Office: [phone]

## 2024-04-10 NOTE — OP NOTE
OPERATIVE REPORT  PATIENT NAME: Fransisca Angela    :  2000  MRN: [id number]  Pt Location:  OR ROOM 10    SURGERY DATE: 4/10/2024    Surgeons and Role:     * Donnie Amezquita MD - Primary    Preop Diagnosis:  Macromastia [N62]  Dorsalgia [M54.9]    Post-Op Diagnosis Codes:     * Macromastia [N62]     * Dorsalgia [M54.9]    Procedure(s):  Bilateral breast reduction     Specimen(s):  ID Type Source Tests Collected by Time Destination   1 : left breast 634 grams Tissue Breast, Left TISSUE EXAM Donnie Amezquita MD 4/10/2024  3:37 PM    2 : right breast- 740 gram Tissue Breast, Right TISSUE EXAM Donnie Amezquita MD 4/10/2024  3:37 PM        Estimated Blood Loss:   50 mL    Drains:  [REMOVED] Urethral Catheter Latex 16 Fr. (Removed)   Number of days: 0       Anesthesia Type:   General    Operative Indications:  Macromastia [N62]  Dorsalgia [M54.9]    Operative Findings:  Inferior pedicles  Right breast resection weight: 740 g  Left breast resection weight: 634 g  Total 100 cc of tumescence injected per side  20 cc exparel for multilevel intercostal nerve block    Complications:   None    Procedure and Technique:    Patient was brought to the operating room, transferred to the operating table in supine fashion. After undergoing general anesthesia, a timeout was performed at which point all patient identifiers were deemed to be correct. The patient's chest and abdomen were prepped and draped in the normal sterile fashion. The pre-operative wise-pattern incisions were re-inforced. A 10 cm inferior pedicle was marked bilaterally. 100 cc of normal tuemscent solution was injected into each breast for hemostasis purposes. A 42 mm NAC sizer was used to resize the new NAC and incised. The pedicle was dissected and defined first. Next, the breast flaps were dissected starting at 2 cm in thickness and gradually thicker with superior dissection. Resection of the excess breast tissue was performed. A multilevel intercostal nerve  block was performed with exparel 10 cc per side, total 20 cc. After resection, patient was tailortacked and sat upright to compare size and symmetry. After satisfactory size and symmetry were obtained, the new NAC was positioned and closure of the wound occurred. 3-0 vicryl was used along the horizontal and vertical limbs for dermis. 3-0 stratafix for the skin along the horizontal limb, 4-0 monocryl for the skin for the vertical limb. The NAC was brought at the new position and secured with 3-0 monocryl for dermis and 4-0 running nylon for skin.After this was completed, prineo wound closure system was performed along the vertical and horizontal incisions. Xeroform was placed over the NAC. The breasts were dressed with gauze, ABD, and bra. It was noted at the end of the case that bilateral nipples were viable. The nipples were viable without venous congestion at the end of the case.     This concluded the procedure. Patient tolerated the procedure well without complications. At the end of the case, all sponge, needle, and instrument counts were correct. Patient was awakened from anesthesia and taken to the PACU in stable condition.     I was present for the entire procedure, A qualified resident physician was not available and a physician assistant was not available during the procedure for retraction, tissue handling, dissection and suturing.      Patient Disposition:  PACU         SIGNATURE: Donnie Amezquita MD  DATE: April 10, 2024  TIME: 4:16 PM

## 2024-04-10 NOTE — ANESTHESIA POSTPROCEDURE EVALUATION
Post-Op Assessment Note    CV Status:  Stable  Pain Score: 0    Pain management: adequate       Mental Status:  Alert and awake   Hydration Status:  Euvolemic   PONV Controlled:  Controlled   Airway Patency:  Patent     Post Op Vitals Reviewed: Yes    No anethesia notable event occurred.    Staff: CRNA               /69 (04/10/24 1623)    Temp 97.7 °F (36.5 °C) (04/10/24 1623)    Pulse 63 (04/10/24 1623)   Resp 16 (04/10/24 1623)    SpO2 100 % (04/10/24 1623)

## 2024-04-10 NOTE — H&P
Plastic Surgery Attending    H&P reviewed, no new changes.    Donnie Amezquita MD   [facility] Plastic and Reconstructive Surgery   [address] [place] [address], [address]   [place] [address]   Office: [phone]    Plastic Surgery Consult     Reason for visit: preop BBR     [facility] 3/15/24  Patient presents for preop for BBR     Discussed again the risks, benefits, procedure, and complications of breast reduction including but not limited to infection, bleeding, scarring, asymmetry, abscess, delayed wound healing, wound dehiscence, contour deformity, partial vs complete nipple necrosis, fat necrosis, changes in nipple sensation, changes in breast feeding ability. If patient were to become pregnant, there is a risk of increase in breast size as well.     Also discussed increased risk of all complications given her severely elevated BMI of 40.6. Due to the ptotic nature of her breasts, also discussed possibility of free nipple graft if needed.     Patient states that she does not have nipple sensation in either breast.     Patient current wears DD, would like C cup, something that fits her body type. Estimated resection is at least 500-650 g per side.     BMI: 40  BSA: 2.28     Breast Exam:  Bilateral large ptotic breasts, with severe grade III ptosis bilaterally  Enlarged areolas  Bilateral shoulder grooving  No masses, skin dimpling, or discharge  No nipple sensation bilaterally        Measurements:                          R                      L  SN-N               40 cm              38 cm  N-IMF              14 cm              14 cm     Patient acknowledged. All questions answered, concerns addressed.     Consents obtained, photos taken.     Prescriptions sent.  Tumescent for blood loss control  Surgery scheduled  -Spent 35 minutes in consultation with patient. Greater than 50% of the total time was spent obtaining history, evaluation, performing exam, discussion of management options including post-operative care,  "answering patient's questions and concerns, chart reviewing, and documentation        Donnie Amezquita MD   [facility] Plastic and Reconstructive Surgery   [address], [address]   [place] [address]   Office: [phone]        HPI from 1/11/24  Patient is a 24 y/o female who presents with symptomatic macromastia. She complains of large, heavy, pendulous breasts that causes her chest, upper back, neck, and shoulder discomfort. She also complains of rashes underneath her breast during humid weather and has difficulty exercising. She has tried conservative therapy including supportive bra, ibuprofen, and creams/powders under her breasts with minimal symptomatic improvement. She denies lumps or discharge on self-exams.     She currently wears DDish and would like to be \"B-C\" cup ideally.     Mammogram status: none     Plans to have more children: maybe     Physical therapy attempted: completed        ROS: 12 pt ROS negative, except as otherwise noted in HPI.     PMH: none  FamHx: non-contrib  SurgHx: wisdom teeth  SocHx: no tobacco,no smoking +social ETOH  Meds: no blood thinners, no steroids  Allergies: famotidine     PE:           Vitals:     01/11/24 1456   BP: 106/78   Pulse: 87   Temp: 97.7 °F (36.5 °C)         General: NC/AT, breathing comfortably on RA  Neuro: CN II-XII grossly intact, symmetric reflexes  HEENT: PERRLA, EOMI, external ears normal, no lesions or deformities, neck supple, trachea midline  Respiratory: CTAB, normal respiratory effort  Cardio: RRR, normal S1, S2, no murmur, rubs, gallops  GI: soft, non-tender, non-distended  MSK: normal alignment, mobility, gait  Skin: no rashes, lesions, subcutaneous nodules        BMI: 40  BSA: 2.28     Breast Exam:  Bilateral large ptotic breasts, with severe grade III ptosis bilaterally  Enlarged areolas  Bilateral shoulder grooving  No masses, skin dimpling, or discharge  Diminished nipple sensation bilaterally        Measurements:                       "    R                      L  SN-N               40 cm              38 cm  N-IMF              14 cm              14 cm        Labs: none     Imaging: none     A/P: 22 y/o female with symptomatic macromastia.     -Patient would benefit from bilateral reduction mammoplasty. Technique: wise-pattern with inferior pedicle with estimated resection weight of 500-650 g per side. Patient's elevated BSA is partly attributable to her height as she is 5 foot 8 inches. Any greater resection would make her flat chested.  -Discussed with patient the risks, benefits, procedure, and complications. Discussed changes/loss/hypersentivity in nipple sensation, diminished breast feeding ability and increase in size of breasts should she become pregnant. Discussed risk of partial vs complete nipple loss due to vascular issues. Discussed that breast reduction will help but may not completely resolve her back, chest, upper neck, and shoulder pain. Due to patient's obesity, I discussed the increased risk of surgical complications including infection, seroma, fat necrosis, abscess, wound dehisence. All of these risks increase with increasing BMI. Due to her large pendulous breasts, the possibility of free nipple graft was also discussed.   -Re-iterated the increased risk of complications due to her elevated BMI of 40  -Patient's questions answered, concerns addressed.  -Photos taken, will submit to insurance  -Spent 45 minutes in consultation with patient. Greater than 50% of the total time was spent obtaining history, evaluation, performing exam, discussion of management options including post-operative care, answering patient's questions and concerns, chart reviewing, and documentation        Donnie Amezquita MD   [facility] Plastic and Reconstructive Surgery   [address] [place] [address], [address]   [place] [address]   Office: [phone]

## 2024-04-10 NOTE — ANESTHESIA PREPROCEDURE EVALUATION
Procedure:  BILATERAL BREAST REDUCTION (Bilateral: Breast)    Relevant Problems   CARDIO   (+) Chronic bilateral thoracic back pain      MUSCULOSKELETAL   (+) Chronic bilateral thoracic back pain      NEURO/PSYCH   (+) Chronic bilateral thoracic back pain      Orthopedic/Musculoskeletal   (+) Neck pain      Other   (+) Class 3 severe obesity due to excess calories with body mass index (BMI) of 40.0 to 44.9 in adult (HCC)   (+) Macromastia        Physical Exam    Airway    Mallampati score: II  TM Distance: <3 FB  Neck ROM: full     Dental   No notable dental hx     Cardiovascular  Cardiovascular exam normal    Pulmonary  Pulmonary exam normal     Other Findings  post-pubertal.      Anesthesia Plan  ASA Score- 3     Anesthesia Type- general with ASA Monitors.         Additional Monitors:     Airway Plan: ETT.           Plan Factors-Exercise tolerance (METS): >4 METS.    Chart reviewed.   Existing labs reviewed.     Patient is not a current smoker. Patient not instructed to abstain from smoking on day of procedure. Patient did not smoke on day of surgery.            Induction- intravenous.    Postoperative Plan-     Informed Consent- Anesthetic plan and risks discussed with patient.  I personally reviewed this patient with the CRNA. Discussed and agreed on the Anesthesia Plan with the CRNA..

## 2024-04-15 PROCEDURE — 88305 TISSUE EXAM BY PATHOLOGIST: CPT | Performed by: PATHOLOGY

## 2024-04-17 ENCOUNTER — OFFICE VISIT (OUTPATIENT)
Dept: PLASTIC SURGERY | Facility: CLINIC | Age: 24
End: 2024-04-17

## 2024-04-17 DIAGNOSIS — N62 MACROMASTIA: Primary | ICD-10-CM

## 2024-04-17 PROCEDURE — 99024 POSTOP FOLLOW-UP VISIT: CPT | Performed by: PHYSICIAN ASSISTANT

## 2024-04-17 NOTE — PROGRESS NOTES
POST-OP EVALUATION  4/17/2024    Subjective   Fransisca Angela is a 24 y.o. female is here today for routine post-operative follow up.  04/10/24 1129         Procedure: BILATERAL BREAST REDUCTION (Bilateral: Breast) - left breast 634 gram / right breast 740 gram     Fransisca feels well. She has no concerns.  She reports improvement in preop symptoms.    Past Medical History:   Diagnosis Date    Allergic rhinitis     GERD (gastroesophageal reflux disease)     No known health problems      Past Surgical History:   Procedure Laterality Date    EGD      [place] BREAST REDUCTION Bilateral 4/10/2024    Procedure: BILATERAL BREAST REDUCTION;  Surgeon: Donnie Amezquita MD;  Location:  MAIN OR;  Service: Plastics    WISDOM TOOTH EXTRACTION          Current Outpatient Medications:     acetaminophen (TYLENOL) 500 mg tablet, Take 1 tablet (500 mg total) by mouth every 4 (four) hours as needed for mild pain or moderate pain, Disp: 30 tablet, Rfl: 2    azelastine (ASTELIN) 0.1 % nasal spray, 1 spray into each nostril 2 (two) times a day Use in each nostril as directed, Disp: 30 mL, Rfl: 1    CRANBERRY PO, Take by mouth in the morning 4/2/24 Takes two tabs daily, Disp: , Rfl:     docusate sodium (COLACE) 100 mg capsule, Take 1 capsule (100 mg total) by mouth 2 (two) times a day as needed for constipation, Disp: 20 capsule, Rfl: 0    fexofenadine (ALLEGRA) 180 MG tablet, Take 1 tablet (180 mg total) by mouth daily (Patient taking differently: Take 180 mg by mouth if needed), Disp: 90 tablet, Rfl: 1    ibuprofen (MOTRIN) 600 mg tablet, Take 1 tablet (600 mg total) by mouth every 6 (six) hours as needed for mild pain, Disp: 30 tablet, Rfl: 0    lidocaine (Lidoderm) 5 %, Apply 1 patch topically over 12 hours daily Remove & Discard patch within 12 hours or as directed by MD (Patient not taking: Reported on 3/15/2024), Disp: 90 patch, Rfl: 0    mupirocin (BACTROBAN) 2 % ointment, Apply topically 2 (two) times a day (Patient not taking: Reported  on 3/15/2024), Disp: 30 g, Rfl: 0    omeprazole (PriLOSEC) 40 MG capsule, TAKE ONE CAPSULE BY MOUTH EVERY DAY, Disp: 30 capsule, Rfl: 0    ondansetron (ZOFRAN) 4 mg tablet, Take 1 tablet (4 mg total) by mouth every 8 (eight) hours as needed for nausea or vomiting, Disp: 20 tablet, Rfl: 0    oxyCODONE (Roxicodone) 5 immediate release tablet, Take 1 tablet (5 mg total) by mouth every 4 (four) hours as needed for severe pain Max Daily Amount: 30 mg, Disp: 30 tablet, Rfl: 0    sodium chloride (OCEAN) 0.65 % nasal spray, 1 spray into each nostril as needed for congestion, Disp: 104 mL, Rfl: 2  Allergies: Famotidine    Objective      Areolar sutures removed.  Slight separation of right areolar incision.  Breasts are soft, nontender. Mild ecchymosis.  Decent symmetry.    Assessment/Plan   Diagnoses and all orders for this visit:    Macromastia    Steristrips added to areolar incisions.  Continue with compression bra.  Followup in one week for Exofin Fusion removal.    Call with any concerns.    Jean Paul Carlisle PA-C

## 2024-04-24 ENCOUNTER — OFFICE VISIT (OUTPATIENT)
Dept: PLASTIC SURGERY | Facility: CLINIC | Age: 24
End: 2024-04-24

## 2024-04-24 DIAGNOSIS — Z98.890 S/P BILATERAL BREAST REDUCTION: Primary | ICD-10-CM

## 2024-04-24 PROCEDURE — 99024 POSTOP FOLLOW-UP VISIT: CPT | Performed by: PHYSICIAN ASSISTANT

## 2024-04-24 NOTE — PROGRESS NOTES
Assessment/Plan:     Patient is a 24-year-old female who is status post bilateral breast reduction by Dr. Amezquita on 4/10/2024.  Please see HPI.    Patient returns to the office today for a routine postoperative check. She continues to have some numbness of her bilateral breasts but no other issues or concerns. Patient will continue to wear a supportive bra and will return to the office in 2 weeks for an incision check or sooner with any questions or concerns.      Diagnoses and all orders for this visit:    S/P bilateral breast reduction          Subjective:     Patient ID: Fransisca Angela is a 24 y.o. female.    HPI    Patient reports no issues or concerns except as above.     Review of Systems    See HPI     Objective:     Physical Exam        All incisions are clean, dry and intact. Breasts are soft, supple, and grossly symmetric. Nipple sensation is intact bilaterally.

## 2024-05-08 ENCOUNTER — OFFICE VISIT (OUTPATIENT)
Dept: PLASTIC SURGERY | Facility: CLINIC | Age: 24
End: 2024-05-08

## 2024-05-08 DIAGNOSIS — Z98.890 S/P BILATERAL BREAST REDUCTION: Primary | ICD-10-CM

## 2024-05-08 PROCEDURE — 99024 POSTOP FOLLOW-UP VISIT: CPT | Performed by: PHYSICIAN ASSISTANT

## 2024-05-08 NOTE — PROGRESS NOTES
Assessment/Plan:     Patient is a 24-year-old female who is status post bilateral breast reduction by Dr. Amezquita on 4/10/2024. Please see HPI.     Patient returns to the office today for an incision check.  Patient has few spitting sutures which were removed today.  She is overall very pleased with her aesthetic and functional results.  She will return to office in approximately 2 months for a 3-month postoperative check or sooner with any questions or concerns.  Patient was counseled on initiating silicone scar treatment.     Diagnoses and all orders for this visit:    S/P bilateral breast reduction          Subjective:     Patient ID: Fransisca Angela is a 24 y.o. female.    HPI    Patient reports no issues or concerns with exception of spitting sutures.    Review of Systems    See HPI    Objective:     Physical Exam      All incisions are completely healed, clean and dry with minimal scarring.  Breasts are soft, supple and grossly symmetric.  Few spitting sutures noted.

## 2024-05-29 ENCOUNTER — OFFICE VISIT (OUTPATIENT)
Dept: BARIATRICS | Facility: CLINIC | Age: 24
End: 2024-05-29
Payer: COMMERCIAL

## 2024-05-29 VITALS
HEIGHT: 68 IN | HEART RATE: 92 BPM | SYSTOLIC BLOOD PRESSURE: 110 MMHG | DIASTOLIC BLOOD PRESSURE: 70 MMHG | BODY MASS INDEX: 41.1 KG/M2 | WEIGHT: 271.2 LBS

## 2024-05-29 DIAGNOSIS — E66.01 CLASS 3 SEVERE OBESITY DUE TO EXCESS CALORIES WITH BODY MASS INDEX (BMI) OF 40.0 TO 44.9 IN ADULT, UNSPECIFIED WHETHER SERIOUS COMORBIDITY PRESENT (HCC): ICD-10-CM

## 2024-05-29 DIAGNOSIS — E66.01 OBESITY, CLASS III, BMI 40-49.9 (MORBID OBESITY) (HCC): Primary | ICD-10-CM

## 2024-05-29 PROCEDURE — 99243 OFF/OP CNSLTJ NEW/EST LOW 30: CPT | Performed by: NURSE PRACTITIONER

## 2024-05-29 NOTE — PROGRESS NOTES
Assessment/Plan:    Obesity, Class III, BMI 40-49.9 (morbid obesity) ([facility])  - Discussed options of HealthyCORE-Intensive Lifestyle Intervention Program, Very Low Calorie Diet-VLCD, Conservative Program, Harika-En-Y Gastric Bypass, and Vertical Sleeve Gastrectomy and the role of weight loss medications.  - Not interested in weight loss surgery.  - Explained the importance of making lifestyle changes with anti-obesity medications.  - Patient is interested in pursuing HealthyCORE-Intensive Lifestyle Intervention Program  - Initial weight loss goal of 5-10% weight loss for improved health  - Weight loss can improve patient's co-morbid conditions and/or prevent weight-related complications.  - Stop Bang 2/8  - Not currently interested in weight loss medications.  - Labs reviewed: CBC and BMP 3/25/2024 was within acceptable range.  - Check TSH, lipid, CMP, A1C, and fasting insulin.       Goals:  Do not skip meals.  Food log (ie.) www.myfitnesspal.com,sparkpeople.com,loseit.com,calorieking.com,etc. baritastic (use skinnytaste.com, [employer] or smartphone jason [employer] for recipes)  No sugary beverages. At least 64oz of water daily.  Increase physical activity by 10 minutes daily. Gradually increase physical activity to a goal of 5 days per week for 30 minutes of MODERATE intensity PLUS 2 days per week of FULL BODY resistance training (use smartphone apps Watchwith, Home Workout, etc.)  Nutrition recommendations per the dietician.  Gradually increase home workouts to goal of 5 days per week.          Fransisca was seen today for consult.    Diagnoses and all orders for this visit:    Obesity, Class III, BMI 40-49.9 (morbid obesity) ([facility])  -     TSH, 3rd generation with Free T4 reflex; Future  -     Lipid panel; Future  -     Comprehensive metabolic panel; Future  -     Hemoglobin A1C; Future  -     Insulin, fasting; Future    Class 3 severe obesity due to excess calories with body mass index (BMI) of 40.0 to 44.9 in adult,  unspecified whether serious comorbidity present (HCC)  -     Ambulatory Referral to Weight Management        Total time spent: 35 min, with >50% face-to-face time spent counseling patient on nonsurgical interventions for the treatment of excess weight. Discussed in detail nonsurgical options including intensive lifestyle intervention program, very low-calorie diet program and conservative program.  Discussed the role of weight loss medications.  Counseled patient on diet behavior and exercise modification for weight loss.      Follow up in approximately  5 months  with Non-Surgical Physician/Advanced Practitioner.    Subjective:   Chief Complaint   Patient presents with    Consult     Pt is here for MWM consult.        Patient ID: Fransisca Angela  is a 24 y.o. female with excess weight/obesity here to pursue weight management.  Previous notes and records have been reviewed.    Past Medical History:   Diagnosis Date    Allergic rhinitis     GERD (gastroesophageal reflux disease)     No known health problems      Past Surgical History:   Procedure Laterality Date    EGD      [place] BREAST REDUCTION Bilateral 4/10/2024    Procedure: BILATERAL BREAST REDUCTION;  Surgeon: Donnie Amezquita MD;  Location:  MAIN OR;  Service: Plastics    WISDOM TOOTH EXTRACTION         HPI:  Wt Readings from Last 20 Encounters:   05/29/24 123 kg (271 lb 3.2 oz)   04/10/24 120 kg (265 lb)   03/15/24 121 kg (267 lb 4 oz)   01/25/24 121 kg (267 lb 12.8 oz)   01/15/24 118 kg (260 lb)   01/11/24 119 kg (263 lb)   12/14/23 121 kg (266 lb 3.2 oz)   12/08/23 120 kg (265 lb 8 oz)   10/11/23 116 kg (256 lb 3.2 oz)   09/25/23 116 kg (255 lb)   07/31/23 111 kg (244 lb)   07/27/23 111 kg (244 lb 9.6 oz)   04/26/23 107 kg (236 lb)   03/21/23 104 kg (230 lb)   03/03/23 103 kg (228 lb)   12/03/22 104 kg (230 lb)   11/17/22 105 kg (232 lb)   12/09/21 130 kg (287 lb)   01/18/21 130 kg (287 lb) (>99%, Z= 2.78)*   09/25/19 107 kg (235 lb) (>99%, Z= 2.34)*     *  Growth percentiles are based on CDC (Girls, 2-20 Years) data.     Obesity/Excess Weight:  Severity: Severe  Onset:  whole life    Modifiers: Diet and Exercise  Contributing factors:  unsure what is contributing  Associated symptoms: fatigue, decreased self esteem, increased shortness of breath, and clothes do not fit    Feels hungry often. Some sweet and salty cravings.    Hydration: 2-3 bottles water, 20 oz ginger ale, 1 cup green tea    Alcohol: none   Smoking: denies  Exercise: recently restarted home workouts 1-2 times per week, exercise was limited due to recent breast reduction   Occupation: recently graduated from [employer] school  Sleep: 6 hours, fragmented   STOP ban/8    Highest weight: current  Current weight: 271.1 lbs BMI 41.24  Goal weight: lower 200 lbs    Colonoscopy: N/A  Mammogram: N/A    The following portions of the patient's history were reviewed and updated as appropriate: allergies, current medications, past family history, past medical history, past social history, past surgical history, and problem list.    Family History   Problem Relation Age of Onset    Coronary artery disease Mother     Diabetes Father     Breast cancer Neg Hx     Colon cancer Neg Hx     Ovarian cancer Neg Hx     Cancer Neg Hx     Anesthesia problems Neg Hx         Review of Systems   HENT:  Positive for sore throat (allergy related).    Respiratory:  Negative for cough and shortness of breath.    Cardiovascular:  Negative for chest pain and palpitations.   Gastrointestinal:  Negative for abdominal pain, constipation, diarrhea, nausea and vomiting.        Denies GERD   Endocrine: Negative for cold intolerance and heat intolerance.   Genitourinary:  Negative for dysuria.   Musculoskeletal:  Negative for arthralgias and back pain.   Skin:  Negative for rash.   Neurological:  Positive for headaches (catamenial migraine - saw PCP).   Psychiatric/Behavioral:  Negative for suicidal ideas (or HI).         Denies depression   +  "anxiety       Objective:  /70 (BP Location: Left arm, Patient Position: Sitting, Cuff Size: Large)   Pulse 92   Ht 5' 8\" (1.727 m)   Wt 123 kg (271 lb 3.2 oz)   LMP 05/07/2024 (Exact Date)   BMI 41.24 kg/m²     Physical Exam  Vitals and nursing note reviewed.        Constitutional   General appearance: Abnormal.  well developed and morbidly obese.   Eyes No conjunctival injection.   Ears, Nose, Mouth, and Throat Oral mucosa moist.   Pulmonary   Respiratory effort: No increased work of breathing or signs of respiratory distress.    Cardiovascular    Examination of extremities for edema and/or varicosities: Normal.  no edema.   Abdomen   Abdomen: Abnormal.  The abdomen was obese.    Musculoskeletal   Normal range of motion  Neurological   Gait and station: Normal.   Psychiatric   Orientation to person, place and time: Normal.    Affect: appropriate     "

## 2024-05-29 NOTE — ASSESSMENT & PLAN NOTE
- Discussed options of HealthyCORE-Intensive Lifestyle Intervention Program, Very Low Calorie Diet-VLCD, Conservative Program, Harika-En-Y Gastric Bypass, and Vertical Sleeve Gastrectomy and the role of weight loss medications.  - Not interested in weight loss surgery.  - Explained the importance of making lifestyle changes with anti-obesity medications.  - Patient is interested in pursuing HealthyCORE-Intensive Lifestyle Intervention Program  - Initial weight loss goal of 5-10% weight loss for improved health  - Weight loss can improve patient's co-morbid conditions and/or prevent weight-related complications.  - Stop Bang 2/8  - Not currently interested in weight loss medications.  - Labs reviewed: CBC and BMP 3/25/2024 was within acceptable range.  - Check TSH, lipid, CMP, A1C, and fasting insulin.       Goals:  Do not skip meals.  Food log (ie.) www.[employer].com,sparkpeople.com,loseit.com,[employer].com,etc. baritastic (use skinnytaste.com, [employer] or smartphone jason [employer] for recipes)  No sugary beverages. At least 64oz of water daily.  Increase physical activity by 10 minutes daily. Gradually increase physical activity to a goal of 5 days per week for 30 minutes of MODERATE intensity PLUS 2 days per week of FULL BODY resistance training (use smartphone apps [employer]ON, Home Workout, etc.)  Nutrition recommendations per the dietician.  Gradually increase home workouts to goal of 5 days per week.

## 2024-06-06 ENCOUNTER — APPOINTMENT (OUTPATIENT)
Dept: LAB | Facility: HOSPITAL | Age: 24
End: 2024-06-06
Payer: COMMERCIAL

## 2024-06-06 DIAGNOSIS — E66.01 OBESITY, CLASS III, BMI 40-49.9 (MORBID OBESITY) (HCC): ICD-10-CM

## 2024-06-06 LAB
ALBUMIN SERPL BCP-MCNC: 4 G/DL (ref 3.5–5)
ALP SERPL-CCNC: 78 U/L (ref 34–104)
ALT SERPL W P-5'-P-CCNC: 6 U/L (ref 7–52)
ANION GAP SERPL CALCULATED.3IONS-SCNC: 7 MMOL/L (ref 4–13)
AST SERPL W P-5'-P-CCNC: 10 U/L (ref 13–39)
BILIRUB SERPL-MCNC: 0.48 MG/DL (ref 0.2–1)
BUN SERPL-MCNC: 8 MG/DL (ref 5–25)
CALCIUM SERPL-MCNC: 9.4 MG/DL (ref 8.4–10.2)
CHLORIDE SERPL-SCNC: 105 MMOL/L (ref 96–108)
CHOLEST SERPL-MCNC: 163 MG/DL
CO2 SERPL-SCNC: 27 MMOL/L (ref 21–32)
CREAT SERPL-MCNC: 0.72 MG/DL (ref 0.6–1.3)
EST. AVERAGE GLUCOSE BLD GHB EST-MCNC: 108 MG/DL
GFR SERPL CREATININE-BSD FRML MDRD: 118 ML/MIN/1.73SQ M
GLUCOSE P FAST SERPL-MCNC: 81 MG/DL (ref 65–99)
HBA1C MFR BLD: 5.4 %
HDLC SERPL-MCNC: 56 MG/DL
INSULIN SERPL-ACNC: 12.15 UIU/ML (ref 1.9–23)
LDLC SERPL CALC-MCNC: 96 MG/DL (ref 0–100)
NONHDLC SERPL-MCNC: 107 MG/DL
POTASSIUM SERPL-SCNC: 4.1 MMOL/L (ref 3.5–5.3)
PROT SERPL-MCNC: 7.3 G/DL (ref 6.4–8.4)
SODIUM SERPL-SCNC: 139 MMOL/L (ref 135–147)
TRIGL SERPL-MCNC: 54 MG/DL
TSH SERPL DL<=0.05 MIU/L-ACNC: 0.76 UIU/ML (ref 0.45–4.5)

## 2024-06-06 PROCEDURE — 84443 ASSAY THYROID STIM HORMONE: CPT

## 2024-06-06 PROCEDURE — 36415 COLL VENOUS BLD VENIPUNCTURE: CPT

## 2024-06-06 PROCEDURE — 83036 HEMOGLOBIN GLYCOSYLATED A1C: CPT

## 2024-06-06 PROCEDURE — 80053 COMPREHEN METABOLIC PANEL: CPT

## 2024-06-06 PROCEDURE — 83525 ASSAY OF INSULIN: CPT

## 2024-06-06 PROCEDURE — 80061 LIPID PANEL: CPT

## 2024-06-07 ENCOUNTER — TELEPHONE (OUTPATIENT)
Dept: BARIATRICS | Facility: CLINIC | Age: 24
End: 2024-06-07

## 2024-06-07 NOTE — TELEPHONE ENCOUNTER
----- Message from CYRUS Field sent at 6/7/2024  7:58 AM EDT -----  Please let the patient know I reviewed her blood work and it was all within acceptable range.

## 2024-07-18 ENCOUNTER — OFFICE VISIT (OUTPATIENT)
Dept: PLASTIC SURGERY | Facility: CLINIC | Age: 24
End: 2024-07-18
Payer: COMMERCIAL

## 2024-07-18 DIAGNOSIS — Z98.890 S/P BILATERAL BREAST REDUCTION: Primary | ICD-10-CM

## 2024-07-18 PROCEDURE — 99212 OFFICE O/P EST SF 10 MIN: CPT | Performed by: PHYSICIAN ASSISTANT

## 2024-07-18 NOTE — PROGRESS NOTES
Assessment/Plan:     Patient is a 24-year-old female who is status post bilateral breast reduction by Dr. Amezquita on 4/10/2024. Please see HPI.      Patient returns to the office today for a scar check. She is pleased with the size and shape of her breasts. She will return to the office as needed with any questions or concerns.      Diagnoses and all orders for this visit:    S/P bilateral breast reduction          Subjective:     Patient ID: Fransisca Angela is a 24 y.o. female.    HPI    Patient reports that she has been doing well. She does believe that she has some mild thickening of her left lateral breast scar and a small amount of thickening of the scars medially.  She has been unable to utilize silicone scar treatment due to itching. She will start using cocoa butter or vitamin E.     Review of Systems    See HPI     Objective:     Physical Exam      All incisions are clean, dry, intact and fully healed. Areas of thickened scar as above. Breasts are soft, supple and grossly symmetric. Please see photos in media.    Subjective: My leg was a little more red but after elevation improved    Wound-Right knee covered with OLIVER dressing- has small scab on distal tibia from hospital stay. Increased bruising and slight redness on anterior aspect and small dry spot on distal posteriorly tibia which he states has been there for years. new pictures loaded in EPIC and doctor and PT notified   Edema-Right knee moderate /max amount down to ankle   Dr Alicea 4-3-24   Outpatient- Geoff Martell 4-8-24   Falls- none   Medication Changes- none     Assessment:Patient is manuevering with Cane today and trialed outside steps and driveway surfaces with S.  He was able to review and progress HEP to standing and improved on Flexion after PROM. Patient and spouse re-education on medication regimen, hydration and proper nutrition and used teach back for carryover and accuracy. Patient will benefit with continued PT for progression and reduce risk of decline. update to PT [place] as needed    Plan for next visit:   Gait training   Review and progress HEP   Increase ROM   Balance/transfers/safety

## 2024-07-25 DIAGNOSIS — K21.9 GASTROESOPHAGEAL REFLUX DISEASE WITHOUT ESOPHAGITIS: ICD-10-CM

## 2024-07-26 RX ORDER — OMEPRAZOLE 40 MG/1
40 CAPSULE, DELAYED RELEASE ORAL DAILY
Qty: 30 CAPSULE | Refills: 3 | Status: SHIPPED | OUTPATIENT
Start: 2024-07-26

## 2024-08-14 ENCOUNTER — TELEPHONE (OUTPATIENT)
Dept: FAMILY MEDICINE CLINIC | Facility: CLINIC | Age: 24
End: 2024-08-14

## 2024-08-14 NOTE — TELEPHONE ENCOUNTER
Patient message: Good afternoon, my name is Fransisca Angela. My birthday is March 6th, 2000. I was interested in making an appointment with my primary care doctor, Pat Barajas. He can give me a call back at [phone]. Thank you.      first attempt to contact patient. no answer left message to return my call on answering machine

## 2024-08-15 ENCOUNTER — TELEPHONE (OUTPATIENT)
Dept: FAMILY MEDICINE CLINIC | Facility: CLINIC | Age: 24
End: 2024-08-15

## 2024-08-15 NOTE — TELEPHONE ENCOUNTER
Patient message;Good afternoon. My name is Fransisca Strong and I have called yesterday to schedule an appointment with my primary doctor, but I had missed the call so I'm calling back today to try to schedule it. If you can call me back at [phone] that would be appreciated. Thank you.      Zane schedule

## 2024-09-23 ENCOUNTER — OFFICE VISIT (OUTPATIENT)
Dept: FAMILY MEDICINE CLINIC | Facility: CLINIC | Age: 24
End: 2024-09-23

## 2024-09-23 VITALS
BODY MASS INDEX: 41.52 KG/M2 | HEIGHT: 68 IN | WEIGHT: 274 LBS | TEMPERATURE: 98 F | RESPIRATION RATE: 16 BRPM | DIASTOLIC BLOOD PRESSURE: 80 MMHG | OXYGEN SATURATION: 95 % | SYSTOLIC BLOOD PRESSURE: 120 MMHG | HEART RATE: 76 BPM

## 2024-09-23 DIAGNOSIS — Z23 ENCOUNTER FOR IMMUNIZATION: ICD-10-CM

## 2024-09-23 DIAGNOSIS — M54.6 CHRONIC BILATERAL THORACIC BACK PAIN: ICD-10-CM

## 2024-09-23 DIAGNOSIS — Z00.00 ANNUAL PHYSICAL EXAM: Primary | ICD-10-CM

## 2024-09-23 DIAGNOSIS — G89.29 CHRONIC BILATERAL THORACIC BACK PAIN: ICD-10-CM

## 2024-09-23 PROCEDURE — 99213 OFFICE O/P EST LOW 20 MIN: CPT

## 2024-09-23 PROCEDURE — 99395 PREV VISIT EST AGE 18-39: CPT

## 2024-09-23 PROCEDURE — 90471 IMMUNIZATION ADMIN: CPT

## 2024-09-23 PROCEDURE — 90656 IIV3 VACC NO PRSV 0.5 ML IM: CPT

## 2024-09-23 NOTE — PATIENT INSTRUCTIONS
[phone]: Chiropractor      If you would like to be added to the wait list for services within [facility], you will need to contact them directly at [facility] - [phone]    Outpatient Mental Health Resources     [place] Suicide Prevention Lifeline: Dial #470  If you prefer to text, you can reach the Crisis Text Line by texting “[place]” to [phone]    ¿Te encuentras en crisis? Envía un mensaje de texto con la palabra AYUDA al [phone] para comunicarte de manera gratuita con un Consejero de Crisis  Apoyo gratuito las 24 horas del día, los 7 días de la semana, al alcance de tu mano.    [facility] CRISIS for mental health emergencies and/or please go to your local Emergency Department Call [phone] if you or a loved one are in a mental health crisis.  You can Visit https://www.[facility].[place].gov/Services/Mental-Health-In-[place]/Documents/Suicide_Prevention_Hotlines.pdf to find 24/7 crisis phone line for other [facility].    [facility]  If you have NO insurance for outpatient Mental Health services you will need to have a liability appointment with [facility] to assess you to qualify for funding. [facility] does NOT provide funding for Medications.  Please call [phone] or [phone] to schedule an intake assessment    [facility]   ? Location 1: [facility] [place] [address] [phone]   ? Location 2: [facility] [address] [address] [phone]        ? English only* Serves ages 4+          ? Accepts Medicare and some commercial plans    [facility]   ? [address] [place] [address] [phone]; [phone]  ? Bilingual English/Azerbaijani Serves ages 5+   ? Accepts Medical Assistance     [facility]   ? [address] [place] [address] [phone]   ? Bilingual English/Azerbaijani Serves ages 14+   ? Accepts Medical Assistance, (Not currently accepting Medicare), & Major Commercial Insurances     [facility]   ? [address] [place] [address] [phone]        ? Bilingual English/Anguillan Serves ages 6+   ? Accepts Medical Assistance & Commercial Insurance     [facility]   ? Previous [address] location is closed  ? [address] [facility], [address] [phone]   ? Bilingual English/Anguillan Serves ages 3+   ? Accepts Medical Assistance & Some Commercial Insurance     [facility]   ? [address] [facility] [address] [place] [address] [phone]   ? Bilingual English/Anguillan Serves ages 5+   ? Accepts Medical Assistance     [facility] FAMILY ANSWERS   ? [address] [place] [address] [phone]  ? Bilingual English/Anguillan Serves ages 3+   ? Accepts Medical Assistance & Some Commercial Insurance     PREVENTIVE MEASURES   ? Location 1: [address] [place] [address] [phone]        ? Location 2: [address] [place] [address]   ? Bilingual English/Anguillan Serves ages 18+  ? Accepts Medical Assistance    [facility], [facility]  ? [address], [place] [address] [phone]  ? Bilingual English/Anguillan  ? Accepts Aetna, Cigna, [employer]um[employer] [facility], [facility], [employer], Medicare, [employer], [facility]. No Medical Assistance    [facility] ([phone])  Medicare/Medicare Advantage, Medical Assistance/HealthChoices, Commercial, and self-pay as payment.    [facility]         ? [address], at [facility], [place] [address]; [phone]         ? Bilingual English/Anguillan Serves ages 6+         ? Accepts Medical Assistance     TEEN HELP LINE  ? [phone]    CRIME VICTIMS [facility]       ? [phone]       ? 24/7 [facility] Hotline    [facility]       ? [phone]       ? 24/7 [facility] Hotline    [facility] OUTPATIENT MENTAL HEALTH  The [facility] will provide funding for outpatient Mental Health services to individuals that do not have insurance coverage for these  "services. We will fund the psychiatrist and therapist, but we are unable to cover costs of medications.  Registration for outpatient services for individuals:  You must be 18 years old  You must have no insurance  You must live in [facility]  Paperwork is done via phone. The individual seeking services can call our inday line: [phone] from 8-4pm.    www.psych[facility]Knotch.[employer] is a resource to find psychotherapy providers, patients can filter therapist search list based on a number of criteria including language, specialty, gender, insurance, etc. Individuals seeking will need to reach out to perspective providers through information in the directory. You are encouraged to contact multiple providers to given that many providers have a significant wait list for services as well as to find a provider is a good fit for you!    [facility] is an organization of families, friends and individuals whose lives have been affected by mental illness. Together, we advocate for better lives for those individuals who have a m)ental illness. Visit: [facility].org to learn more or to search for local support resources including qualified mental health providers.   Patient Education     Routine physical for adults   The Basics   Written by the doctors and editors at [facility]   What is a physical? -- A physical is a routine visit, or \"check-up,\" with your doctor. You might also hear it called a \"wellness visit\" or \"preventive visit.\"  During each visit, the doctor will:   Ask about your physical and mental health   Ask about your habits, behaviors, and lifestyle   Do an exam   Give you vaccines if needed   Talk to you about any medicines you take   Give advice about your health   Answer your questions  Getting regular check-ups is an important part of taking care of your health. It can help your doctor find and treat any problems you have. But it's also important for preventing health problems.  A routine physical is " "different from a \"sick visit.\" A sick visit is when you see a doctor because of a health concern or problem. Since physicals are scheduled ahead of time, you can think about what you want to ask the doctor.  How often should I get a physical? -- It depends on your age and health. In general, for people age 21 years and older:   If you are younger than 50 years, you might be able to get a physical every 3 years.   If you are 50 years or older, your doctor might recommend a physical every year.  If you have an ongoing health condition, like diabetes or high blood pressure, your doctor will probably want to see you more often.  What happens during a physical? -- In general, each visit will include:   Physical exam - The doctor or nurse will check your height, weight, heart rate, and blood pressure. They will also look at your eyes and ears. They will ask about how you are feeling and whether you have any symptoms that bother you.   Medicines - It's a good idea to bring a list of all the medicines you take to each doctor visit. Your doctor will talk to you about your medicines and answer any questions. Tell them if you are having any side effects that bother you. You should also tell them if you are having trouble paying for any of your medicines.   Habits and behaviors - This includes:   Your diet   Your exercise habits   Whether you smoke, drink alcohol, or use drugs   Whether you are sexually active   Whether you feel safe at home  Your doctor will talk to you about things you can do to improve your health and lower your risk of health problems. They will also offer help and support. For example, if you want to quit smoking, they can give you advice and might prescribe medicines. If you want to improve your diet or get more physical activity, they can help you with this, too.   Lab tests, if needed - The tests you get will depend on your age and situation. For example, your doctor might want to check " "your:   Cholesterol   Blood sugar   Iron level   Vaccines - The recommended vaccines will depend on your age, health, and what vaccines you already had. Vaccines are very important because they can prevent certain serious or deadly infections.   Discussion of screening - \"Screening\" means checking for diseases or other health problems before they cause symptoms. Your doctor can recommend screening based on your age, risk, and preferences. This might include tests to check for:   Cancer, such as breast, prostate, cervical, ovarian, colorectal, prostate, lung, or skin cancer   Sexually transmitted infections, such as chlamydia and gonorrhea   Mental health conditions like depression and anxiety  Your doctor will talk to you about the different types of screening tests. They can help you decide which screenings to have. They can also explain what the results might mean.   Answering questions - The physical is a good time to ask the doctor or nurse questions about your health. If needed, they can refer you to other doctors or specialists, too.  Adults older than 65 years often need other care, too. As you get older, your doctor will talk to you about:   How to prevent falling at home   Hearing or vision tests   Memory testing   How to take your medicines safely   Making sure that you have the help and support you need at home  All topics are updated as new evidence becomes available and our peer review process is complete.  This topic retrieved from [employer] on: May 02, 2024.  Topic 909898 Version 1.0  Release: 32.4.3 - C32.122  © 2024 UpToDate, Inc. and/or its affiliates. All rights reserved.  Consumer Information Use and Disclaimer   Disclaimer: This generalized information is a limited summary of diagnosis, treatment, and/or medication information. It is not meant to be comprehensive and should be used as a tool to help the user understand and/or assess potential diagnostic and treatment options. It does NOT include " all information about conditions, treatments, medications, side effects, or risks that may apply to a specific patient. It is not intended to be medical advice or a substitute for the medical advice, diagnosis, or treatment of a health care provider based on the health care provider's examination and assessment of a patient's specific and unique circumstances. Patients must speak with a health care provider for complete information about their health, medical questions, and treatment options, including any risks or benefits regarding use of medications. This information does not endorse any treatments or medications as safe, effective, or approved for treating a specific patient. UpToDate, Inc. and its affiliates disclaim any warranty or liability relating to this information or the use thereof.The use of this information is governed by the Terms of Use, available at https://www.woltersReelGenieuwer.com/en/know/clinical-effectiveness-terms. 2024© UpToDate, Inc. and its affiliates and/or licensors. All rights reserved.  Copyright   © 2024 UpToDate, Inc. and/or its affiliates. All rights reserved.

## 2024-09-23 NOTE — PROGRESS NOTES
Adult Annual Physical  Name: Fransisca Angela      : 2000      MRN: [id number]  Encounter Provider: CYRUS Esquivel  Encounter Date: 2024   Encounter department: [facility]    Assessment & Plan  Annual physical exam         Chronic bilateral thoracic back pain    Orders:    Ambulatory Referral to Chiropractic; Future    Encounter for immunization    Orders:    influenza vaccine preservative-free 0.5 mL IM (Fluzone, Afluria, Fluarix, Flulaval)    Immunizations and preventive care screenings were discussed with patient today. Appropriate education was printed on patient's after visit summary.    Counseling:  Dental Health: discussed importance of regular tooth brushing, flossing, and dental visits.  Exercise: the importance of regular exercise/physical activity was discussed. Recommend exercise 3-5 times per week for at least 30 minutes.       Depression Screening and Follow-up Plan: Patient was screened for depression during today's encounter. They screened negative with a PHQ-2 score of 2.        History of Present Illness     Adult Annual Physical:  Patient presents for annual physical.   Pt has been experiencing chronic thoracic back pain which has been ongoing for several months. Has done PT and recently has a breast reduction which did not help.  .     Diet and Physical Activity:  - Diet/Nutrition: well balanced diet, consuming 3-5 servings of fruits/vegetables daily and portion control.  - Exercise: walking.    Depression Screening:  - PHQ-2 Score: 2  - PHQ-9 Score: 9    General Health:  - Sleep: 4-6 hours of sleep on average.  - Hearing: normal hearing right ear.  - Vision: wears glasses.  - Dental: regular dental visits.    /GYN Health:  - Follows with GYN: yes.   - Last menstrual cycle: 2024.   - History of STDs: no    Advanced Care Planning:  - Has an advanced directive?: no    - Has a durable medical POA?: no    - ACP document given to  "patient?: no      Review of Systems   Constitutional:  Negative for chills and fever.   HENT:  Negative for ear pain and sore throat.    Eyes:  Negative for pain and visual disturbance.   Respiratory:  Negative for cough and shortness of breath.    Cardiovascular:  Negative for chest pain and palpitations.   Gastrointestinal:  Negative for abdominal pain and vomiting.   Genitourinary:  Negative for dysuria and hematuria.   Musculoskeletal:  Positive for back pain. Negative for arthralgias.   Skin:  Negative for color change and rash.   Neurological:  Negative for seizures and syncope.   All other systems reviewed and are negative.        Objective     /80 (BP Location: Right arm, Patient Position: Sitting, Cuff Size: Large)   Pulse 76   Temp 98 °F (36.7 °C) (Temporal)   Resp 16   Ht 5' 8\" (1.727 m)   Wt 124 kg (274 lb)   LMP 09/19/2024   SpO2 95%   BMI 41.66 kg/m²     Physical Exam  Vitals and nursing note reviewed.   Constitutional:       General: She is not in acute distress.     Appearance: Normal appearance. She is well-developed. She is obese.   HENT:      Head: Normocephalic and atraumatic.      Right Ear: External ear normal.      Left Ear: External ear normal.      Nose: Nose normal.   Eyes:      Conjunctiva/sclera: Conjunctivae normal.   Cardiovascular:      Rate and Rhythm: Normal rate and regular rhythm.      Pulses: Normal pulses.      Heart sounds: Normal heart sounds. No murmur heard.  Pulmonary:      Effort: Pulmonary effort is normal. No respiratory distress.      Breath sounds: Normal breath sounds.   Abdominal:      Palpations: Abdomen is soft.      Tenderness: There is no abdominal tenderness.   Musculoskeletal:         General: No swelling. Normal range of motion.      Cervical back: Normal range of motion and neck supple.   Skin:     General: Skin is warm and dry.      Capillary Refill: Capillary refill takes less than 2 seconds.   Neurological:      Mental Status: She is alert and " oriented to person, place, and time. Mental status is at baseline.   Psychiatric:         Mood and Affect: Mood normal.         Behavior: Behavior normal.         Thought Content: Thought content normal.         Judgment: Judgment normal.

## 2024-10-17 ENCOUNTER — APPOINTMENT (EMERGENCY)
Dept: RADIOLOGY | Facility: HOSPITAL | Age: 24
End: 2024-10-17
Payer: COMMERCIAL

## 2024-10-17 ENCOUNTER — HOSPITAL ENCOUNTER (EMERGENCY)
Facility: HOSPITAL | Age: 24
Discharge: HOME | End: 2024-10-17
Attending: EMERGENCY MEDICINE
Payer: COMMERCIAL

## 2024-10-17 VITALS
WEIGHT: 277.78 LBS | DIASTOLIC BLOOD PRESSURE: 68 MMHG | SYSTOLIC BLOOD PRESSURE: 120 MMHG | TEMPERATURE: 98.7 F | OXYGEN SATURATION: 99 % | HEART RATE: 61 BPM | BODY MASS INDEX: 42.24 KG/M2 | RESPIRATION RATE: 14 BRPM

## 2024-10-17 DIAGNOSIS — R07.9 CHEST PAIN: Primary | ICD-10-CM

## 2024-10-17 DIAGNOSIS — E87.6 ACUTE HYPOKALEMIA: ICD-10-CM

## 2024-10-17 LAB
ANION GAP SERPL CALCULATED.3IONS-SCNC: 8 MMOL/L (ref 4–13)
ATRIAL RATE: 77 BPM
BASOPHILS # BLD AUTO: 0.02 THOUSANDS/ΜL (ref 0–0.1)
BASOPHILS NFR BLD AUTO: 0 % (ref 0–1)
BUN SERPL-MCNC: 11 MG/DL (ref 5–25)
CALCIUM SERPL-MCNC: 9.3 MG/DL (ref 8.4–10.2)
CARDIAC TROPONIN I PNL SERPL HS: <2 NG/L
CHLORIDE SERPL-SCNC: 103 MMOL/L (ref 96–108)
CO2 SERPL-SCNC: 26 MMOL/L (ref 21–32)
CREAT SERPL-MCNC: 0.64 MG/DL (ref 0.6–1.3)
EOSINOPHIL # BLD AUTO: 0.11 THOUSAND/ΜL (ref 0–0.61)
EOSINOPHIL NFR BLD AUTO: 2 % (ref 0–6)
ERYTHROCYTE [DISTWIDTH] IN BLOOD BY AUTOMATED COUNT: 14.2 % (ref 11.6–15.1)
EXT PREGNANCY TEST URINE: NEGATIVE
EXT. CONTROL: NORMAL
GFR SERPL CREATININE-BSD FRML MDRD: 126 ML/MIN/1.73SQ M
GLUCOSE SERPL-MCNC: 73 MG/DL (ref 65–140)
HCT VFR BLD AUTO: 38.3 % (ref 34.8–46.1)
HGB BLD-MCNC: 11.7 G/DL (ref 11.5–15.4)
IMM GRANULOCYTES # BLD AUTO: 0.01 THOUSAND/UL (ref 0–0.2)
IMM GRANULOCYTES NFR BLD AUTO: 0 % (ref 0–2)
LYMPHOCYTES # BLD AUTO: 2.61 THOUSANDS/ΜL (ref 0.6–4.47)
LYMPHOCYTES NFR BLD AUTO: 40 % (ref 14–44)
MCH RBC QN AUTO: 25.1 PG (ref 26.8–34.3)
MCHC RBC AUTO-ENTMCNC: 30.5 G/DL (ref 31.4–37.4)
MCV RBC AUTO: 82 FL (ref 82–98)
MONOCYTES # BLD AUTO: 0.48 THOUSAND/ΜL (ref 0.17–1.22)
MONOCYTES NFR BLD AUTO: 7 % (ref 4–12)
NEUTROPHILS # BLD AUTO: 3.28 THOUSANDS/ΜL (ref 1.85–7.62)
NEUTS SEG NFR BLD AUTO: 51 % (ref 43–75)
NRBC BLD AUTO-RTO: 0 /100 WBCS
P AXIS: 39 DEGREES
PLATELET # BLD AUTO: 284 THOUSANDS/UL (ref 149–390)
PMV BLD AUTO: 9.3 FL (ref 8.9–12.7)
POTASSIUM SERPL-SCNC: 3.4 MMOL/L (ref 3.5–5.3)
PR INTERVAL: 170 MS
QRS AXIS: 55 DEGREES
QRSD INTERVAL: 90 MS
QT INTERVAL: 400 MS
QTC INTERVAL: 452 MS
RBC # BLD AUTO: 4.67 MILLION/UL (ref 3.81–5.12)
SODIUM SERPL-SCNC: 137 MMOL/L (ref 135–147)
T WAVE AXIS: 17 DEGREES
VENTRICULAR RATE: 77 BPM
WBC # BLD AUTO: 6.51 THOUSAND/UL (ref 4.31–10.16)

## 2024-10-17 PROCEDURE — 80048 BASIC METABOLIC PNL TOTAL CA: CPT | Performed by: EMERGENCY MEDICINE

## 2024-10-17 PROCEDURE — 85025 COMPLETE CBC W/AUTO DIFF WBC: CPT | Performed by: EMERGENCY MEDICINE

## 2024-10-17 PROCEDURE — 71046 X-RAY EXAM CHEST 2 VIEWS: CPT

## 2024-10-17 PROCEDURE — 81025 URINE PREGNANCY TEST: CPT | Performed by: EMERGENCY MEDICINE

## 2024-10-17 PROCEDURE — 96374 THER/PROPH/DIAG INJ IV PUSH: CPT

## 2024-10-17 PROCEDURE — 93010 ELECTROCARDIOGRAM REPORT: CPT

## 2024-10-17 PROCEDURE — 99285 EMERGENCY DEPT VISIT HI MDM: CPT

## 2024-10-17 PROCEDURE — 36415 COLL VENOUS BLD VENIPUNCTURE: CPT | Performed by: EMERGENCY MEDICINE

## 2024-10-17 PROCEDURE — 99285 EMERGENCY DEPT VISIT HI MDM: CPT | Performed by: EMERGENCY MEDICINE

## 2024-10-17 PROCEDURE — 84484 ASSAY OF TROPONIN QUANT: CPT | Performed by: EMERGENCY MEDICINE

## 2024-10-17 PROCEDURE — 93005 ELECTROCARDIOGRAM TRACING: CPT

## 2024-10-17 RX ORDER — KETOROLAC TROMETHAMINE 30 MG/ML
15 INJECTION, SOLUTION INTRAMUSCULAR; INTRAVENOUS ONCE
Status: COMPLETED | OUTPATIENT
Start: 2024-10-17 | End: 2024-10-17

## 2024-10-17 RX ADMIN — KETOROLAC TROMETHAMINE 15 MG: 30 INJECTION, SOLUTION INTRAMUSCULAR; INTRAVENOUS at 13:38

## 2024-10-17 NOTE — ED PROVIDER NOTES
Time reflects when diagnosis was documented in both MDM as applicable and the Disposition within this note       Time User Action Codes Description Comment    10/17/2024  2:26 PM Montserrat Washington Add [R07.9] Chest pain     10/17/2024  2:27 PM Montserrat Washington Add [E87.6] Acute hypokalemia           ED Disposition       ED Disposition   Discharge    Condition   Stable    Date/Time   Thu Oct 17, 2024  2:26 PM    Comment   Fransisca Settles discharge to home/self care.                   Assessment & Plan       Medical Decision Making  Chest pain, possibly costochondritis or reflux - Will check CBC to r/o anemia, BMP to r/o lyte abnormality/ALICE, troponin to assess for NSTEMI, EKG to r/o STEMI/ischemic changes, CXR to r/o PTX/PNA/pulmonary edema/effusion.    Amount and/or Complexity of Data Reviewed  Labs: ordered. Decision-making details documented in ED Course.  Radiology: ordered and independent interpretation performed.    Risk  Prescription drug management.        ED Course as of 10/17/24 1446   Thu Oct 17, 2024   1303 SpO2: 100 %  Not hypoxic   1304 Temperature: 98.7 °F (37.1 °C)  Afebrile   1336 PREGNANCY TEST URINE: Negative  Negative   1341 Hemoglobin: 11.7  Negative for anemia   1404 hs TnI 0hr: <2  Negative   1426 Updated pt and family on results.       Medications   ketorolac (TORADOL) injection 15 mg (15 mg Intravenous Given 10/17/24 1338)       ED Risk Strat Scores   HEART Risk Score      Flowsheet Row Most Recent Value   Heart Score Risk Calculator    History 0 Filed at: 10/17/2024 1405   ECG 0 Filed at: 10/17/2024 1405   Age 0 Filed at: 10/17/2024 1405   Risk Factors 1 Filed at: 10/17/2024 1405   Troponin 0 Filed at: 10/17/2024 1405   HEART Score 1 Filed at: 10/17/2024 1405                           PERC Rule for PE      Flowsheet Row Most Recent Value   PERC Rule for PE    Age >=50 0 Filed at: 10/17/2024 1329   HR >=100 0 Filed at: 10/17/2024 1329   O2 Sat on room air < 95% 0 Filed at: 10/17/2024 1323    History of PE or DVT 0 Filed at: 10/17/2024 1329   Recent trauma or surgery 0 Filed at: 10/17/2024 1329   Hemoptysis 0 Filed at: 10/17/2024 1329   Exogenous estrogen 0 Filed at: 10/17/2024 1329   Unilateral leg swelling 0 Filed at: 10/17/2024 1329   PERC Rule for PE Results 0 Filed at: 10/17/2024 1329                                History of Present Illness       Chief Complaint   Patient presents with    Chest Pain     Chest pain - sharp & burning since 9am.  Hx of GERD - pt said it feels worse then GERD.  Stopped taking Omeprazole, dizziness & headache also       Past Medical History:   Diagnosis Date    Allergic rhinitis     GERD (gastroesophageal reflux disease)       Past Surgical History:   Procedure Laterality Date    EGD      UT BREAST REDUCTION Bilateral 4/10/2024    Procedure: BILATERAL BREAST REDUCTION;  Surgeon: Donnie Amezquita MD;  Location:  MAIN OR;  Service: Plastics    WISDOM TOOTH EXTRACTION        Family History   Problem Relation Age of Onset    Coronary artery disease Mother     Diabetes Father     Breast cancer Neg Hx     Colon cancer Neg Hx     Ovarian cancer Neg Hx     Cancer Neg Hx     Anesthesia problems Neg Hx       Social History     Tobacco Use    Smoking status: Never     Passive exposure: Never    Smokeless tobacco: Never    Tobacco comments:     Never a smoker or use of any tobacco products per pt    Vaping Use    Vaping status: Never Used   Substance Use Topics    Alcohol use: Yes     Comment: couple times/year    Drug use: Never     Comment: Denies any drug use per pt      E-Cigarette/Vaping    E-Cigarette Use Never User     Comments Denies any use per pt       E-Cigarette/Vaping Substances      I have reviewed and agree with the history as documented.     24 y.o. F w/h/o GERD p/w chest pain x 2-3 days.  History of GERD but stopped taking omeprazole.  Constant, left sided, sharp.  Hurts with drinking.  Denies F/C, cough, SOB, abd pain, N/V, LE edema, calf pain, h/o PE/DVT.         History provided by:  Patient   used: No    Chest Pain  Associated symptoms: headache    Associated symptoms: no abdominal pain, no cough, no fever, no nausea, no palpitations, no shortness of breath and not vomiting        Review of Systems   Constitutional:  Negative for chills and fever.   Respiratory:  Negative for cough and shortness of breath.    Cardiovascular:  Positive for chest pain. Negative for palpitations and leg swelling.   Gastrointestinal:  Negative for abdominal pain, nausea and vomiting.   Neurological:  Positive for headaches.           Objective       ED Triage Vitals [10/17/24 1258]   Temperature Pulse Blood Pressure Respirations SpO2 Patient Position - Orthostatic VS   98.7 °F (37.1 °C) 73 121/76 20 100 % Lying      Temp Source Heart Rate Source BP Location FiO2 (%) Pain Score    Oral Monitor Right arm -- 6      Vitals      Date and Time Temp Pulse SpO2 Resp BP Pain Score FACES Pain Rating User   10/17/24 1430 -- 61 99 % 14 -- 5 -- LAB   10/17/24 1400 -- 62 100 % 17 120/68 -- -- SS   10/17/24 1338 -- -- -- -- -- 6 --    10/17/24 1258 98.7 °F (37.1 °C) 73 100 % 20 121/76 6 -- SB            Physical Exam  Vitals and nursing note reviewed.   Constitutional:       General: She is not in acute distress.     Appearance: She is well-developed. She is not ill-appearing, toxic-appearing or diaphoretic.   HENT:      Head: Normocephalic and atraumatic.   Eyes:      General: No scleral icterus.     Conjunctiva/sclera:      Right eye: Right conjunctiva is not injected.      Left eye: Left conjunctiva is not injected.   Neck:      Vascular: No JVD.      Trachea: Trachea normal.   Cardiovascular:      Rate and Rhythm: Normal rate and regular rhythm.      Pulses: Normal pulses.      Heart sounds: Normal heart sounds. No murmur heard.     No friction rub.   Pulmonary:      Effort: Pulmonary effort is normal. No accessory muscle usage or respiratory distress.      Breath sounds:  Normal breath sounds. No stridor. No wheezing, rhonchi or rales.   Chest:      Chest wall: Tenderness (left) present.   Abdominal:      General: There is no distension.      Palpations: Abdomen is soft. Abdomen is not rigid.      Tenderness: There is no abdominal tenderness. There is no guarding or rebound.   Musculoskeletal:      Cervical back: Normal range of motion.      Right lower leg: No edema.      Left lower leg: No edema.      Comments: No calf tenderness   Skin:     General: Skin is warm and dry.      Coloration: Skin is not pale.      Findings: No rash.   Neurological:      Mental Status: She is alert.      GCS: GCS eye subscore is 4. GCS verbal subscore is 5. GCS motor subscore is 6.      Motor: No weakness (Grossly intact).         Results Reviewed       Procedure Component Value Units Date/Time    HS Troponin 0hr (reflex protocol) [[id number]]  (Normal) Collected: 10/17/24 1331    Lab Status: Final result Specimen: Blood from Arm, Left Updated: 10/17/24 1403     hs TnI 0hr <2 ng/L     Basic metabolic panel [[id number]]  (Abnormal) Collected: 10/17/24 1331    Lab Status: Final result Specimen: Blood from Arm, Left Updated: 10/17/24 1353     Sodium 137 mmol/L      Potassium 3.4 mmol/L      Chloride 103 mmol/L      CO2 26 mmol/L      ANION GAP 8 mmol/L      BUN 11 mg/dL      Creatinine 0.64 mg/dL      Glucose 73 mg/dL      Calcium 9.3 mg/dL      eGFR 126 ml/min/1.73sq m     Narrative:      National Kidney Disease Foundation guidelines for Chronic Kidney Disease (CKD):     Stage 1 with normal or high GFR (GFR > 90 mL/min/1.73 square meters)    Stage 2 Mild CKD (GFR = 60-89 mL/min/1.73 square meters)    Stage 3A Moderate CKD (GFR = 45-59 mL/min/1.73 square meters)    Stage 3B Moderate CKD (GFR = 30-44 mL/min/1.73 square meters)    Stage 4 Severe CKD (GFR = 15-29 mL/min/1.73 square meters)    Stage 5 End Stage CKD (GFR <15 mL/min/1.73 square meters)  Note: GFR calculation is accurate only with a steady state  creatinine    CBC and differential [[id number]]  (Abnormal) Collected: 10/17/24 1331    Lab Status: Final result Specimen: Blood from Arm, Left Updated: 10/17/24 1337     WBC 6.51 Thousand/uL      RBC 4.67 Million/uL      Hemoglobin 11.7 g/dL      Hematocrit 38.3 %      MCV 82 fL      MCH 25.1 pg      MCHC 30.5 g/dL      RDW 14.2 %      MPV 9.3 fL      Platelets 284 Thousands/uL      nRBC 0 /100 WBCs      Segmented % 51 %      Immature Grans % 0 %      Lymphocytes % 40 %      Monocytes % 7 %      Eosinophils Relative 2 %      Basophils Relative 0 %      Absolute Neutrophils 3.28 Thousands/µL      Absolute Immature Grans 0.01 Thousand/uL      Absolute Lymphocytes 2.61 Thousands/µL      Absolute Monocytes 0.48 Thousand/µL      Eosinophils Absolute 0.11 Thousand/µL      Basophils Absolute 0.02 Thousands/µL     POCT pregnancy, urine [[id number]]  (Normal) Resulted: 10/17/24 1334    Lab Status: Final result Updated: 10/17/24 1334     EXT Preg Test, Ur Negative     Control Valid            XR chest 2 views   ED Interpretation by Montserrat Washington DO (10/17 1420)   Interpreted by me as no acute abnormalities      Final Interpretation by Darling Coronel MD (10/17 1425)      No focal consolidation, pleural effusion, or pneumothorax.            Resident: Sotero Johnson I, the attending radiologist, have reviewed the images and agree with the final report above.      Workstation performed: [id number]             ECG 12 Lead Documentation Only    Date/Time: 10/17/2024 1:07 PM    Performed by: Montserrat Washington DO  Authorized by: Montserrat Washington DO    Indications / Diagnosis:  Chest pain  ECG reviewed by me, the ED Provider: yes    Patient location:  Bedside  Rate:     ECG rate:  77    ECG rate assessment: normal    Rhythm:     Rhythm: sinus rhythm    Ectopy:     Ectopy: none    QRS:     QRS axis:  Normal    QRS intervals:  Normal  ST segments:     ST segments:  Normal  T waves:     T waves: normal        ED  Medication and Procedure Management   Prior to Admission Medications   Prescriptions Last Dose Informant Patient Reported? Taking?   CRANBERRY PO Not Taking  Yes No   Sig: Take by mouth in the morning 4/2/24 Takes two tabs daily   Patient not taking: Reported on 10/17/2024   acetaminophen (TYLENOL) 500 mg tablet   No Yes   Sig: Take 1 tablet (500 mg total) by mouth every 4 (four) hours as needed for mild pain or moderate pain   ibuprofen (MOTRIN) 600 mg tablet Not Taking Self No No   Sig: Take 1 tablet (600 mg total) by mouth every 6 (six) hours as needed for mild pain   Patient not taking: Reported on 10/17/2024   omeprazole (PriLOSEC) 40 MG capsule Not Taking  No No   Sig: Take 1 capsule (40 mg total) by mouth daily   Patient not taking: Reported on 10/17/2024      Facility-Administered Medications: None     Discharge Medication List as of 10/17/2024  2:26 PM        CONTINUE these medications which have NOT CHANGED    Details   acetaminophen (TYLENOL) 500 mg tablet Take 1 tablet (500 mg total) by mouth every 4 (four) hours as needed for mild pain or moderate pain, Starting Fri 3/15/2024, Normal      CRANBERRY PO Take by mouth in the morning 4/2/24 Takes two tabs daily, Historical Med      ibuprofen (MOTRIN) 600 mg tablet Take 1 tablet (600 mg total) by mouth every 6 (six) hours as needed for mild pain, Starting Fri 3/3/2023, Normal      omeprazole (PriLOSEC) 40 MG capsule Take 1 capsule (40 mg total) by mouth daily, Starting Fri 7/26/2024, Normal           No discharge procedures on file.  ED SEPSIS DOCUMENTATION   Time reflects when diagnosis was documented in both MDM as applicable and the Disposition within this note       Time User Action Codes Description Comment    10/17/2024  2:26 PM Montserrat Washington [R07.9] Chest pain     10/17/2024  2:27 PM Montserrat Washington [E87.6] Acute hypokalemia                  Montserrat Washington DO  10/17/24 3664

## 2024-10-18 ENCOUNTER — OFFICE VISIT (OUTPATIENT)
Age: 24
End: 2024-10-18
Payer: COMMERCIAL

## 2024-10-18 ENCOUNTER — APPOINTMENT (OUTPATIENT)
Age: 24
End: 2024-10-18
Payer: COMMERCIAL

## 2024-10-18 VITALS
HEART RATE: 58 BPM | OXYGEN SATURATION: 99 % | HEIGHT: 68 IN | TEMPERATURE: 97.7 F | DIASTOLIC BLOOD PRESSURE: 70 MMHG | SYSTOLIC BLOOD PRESSURE: 116 MMHG | BODY MASS INDEX: 41.89 KG/M2 | WEIGHT: 276.4 LBS

## 2024-10-18 VITALS
SYSTOLIC BLOOD PRESSURE: 112 MMHG | HEIGHT: 68 IN | WEIGHT: 276 LBS | DIASTOLIC BLOOD PRESSURE: 82 MMHG | BODY MASS INDEX: 41.83 KG/M2

## 2024-10-18 DIAGNOSIS — M54.50 CHRONIC BILATERAL LOW BACK PAIN WITHOUT SCIATICA: Primary | ICD-10-CM

## 2024-10-18 DIAGNOSIS — G89.29 CHRONIC BILATERAL LOW BACK PAIN WITHOUT SCIATICA: Primary | ICD-10-CM

## 2024-10-18 DIAGNOSIS — R10.30 LOWER ABDOMINAL PAIN: ICD-10-CM

## 2024-10-18 DIAGNOSIS — K59.00 CONSTIPATION, UNSPECIFIED CONSTIPATION TYPE: ICD-10-CM

## 2024-10-18 DIAGNOSIS — M54.6 CHRONIC BILATERAL THORACIC BACK PAIN: ICD-10-CM

## 2024-10-18 DIAGNOSIS — K21.9 GASTROESOPHAGEAL REFLUX DISEASE WITHOUT ESOPHAGITIS: Primary | ICD-10-CM

## 2024-10-18 DIAGNOSIS — G89.29 CHRONIC BILATERAL THORACIC BACK PAIN: ICD-10-CM

## 2024-10-18 PROCEDURE — 72070 X-RAY EXAM THORAC SPINE 2VWS: CPT

## 2024-10-18 PROCEDURE — 99203 OFFICE O/P NEW LOW 30 MIN: CPT | Performed by: CHIROPRACTOR

## 2024-10-18 PROCEDURE — 99214 OFFICE O/P EST MOD 30 MIN: CPT | Performed by: DIETITIAN, REGISTERED

## 2024-10-18 PROCEDURE — 98941 CHIROPRACT MANJ 3-4 REGIONS: CPT | Performed by: CHIROPRACTOR

## 2024-10-18 PROCEDURE — 72110 X-RAY EXAM L-2 SPINE 4/>VWS: CPT

## 2024-10-18 RX ORDER — PANTOPRAZOLE SODIUM 40 MG/1
40 TABLET, DELAYED RELEASE ORAL
Qty: 30 TABLET | Refills: 5 | Status: SHIPPED | OUTPATIENT
Start: 2024-10-18

## 2024-10-18 NOTE — PROGRESS NOTES
Ambulatory Visit  Name: Fransisca Angela      : 2000      MRN: [id number]  Encounter Provider: Minh Carter PA-C  Encounter Date: 10/18/2024   Encounter department: [facility]    Assessment & Plan  Gastroesophageal reflux disease without esophagitis  Patient reports after her EGD in , she took omeprazole for a while, which helped with her indigestion and abdominal pain but did not help her dysphagia.  She then stopped the medication for a while.  Recently, her indigestion and abdominal pain have returned so she started omeprazole again but it is not helping like it used to, and her pain has gotten worse.  She also has persistent dysphagia, which she describes as difficulty initiating swallows.  This occurs with saliva, liquids, foods.  She already avoids GERD dietary triggers, limits EtOH, limits caffeine, and does not use NSAIDs often.  She denies any loss of appetite, nausea, vomiting, unintentional weight loss.  EGD 2023 showed irregular Z-line, mild patchy abnormal mucosa with linear furrows in the middle and lower thirds of the esophagus, gastritis.  Biopsies normal.  VBS 2023 with minimal oropharyngeal dysphagia characterized by piecemeal deglutition, premature spillage to the pyriforms, and delayed swallow initiation.   Orders:    pantoprazole (PROTONIX) 40 mg tablet; Take 1 tablet (40 mg total) by mouth daily before breakfast  -Discontinue omeprazole.  Start pantoprazole 40 mg once daily 30 minutes before breakfast.    -Continue dietary/lifestyle management of GERD.  -Can consider referral to ENT if dysphagia persists after GERD symptoms are controlled.  Constipation, unspecified constipation type  She also reports a history of chronic constipation.  She has 2 BMs/week on average and has intermittent episodes of worsening abdominal pain related to her bowel habits.  She denies any black or blood stools.  She drinks 3-4 bottles of water  daily.  Admits she likely does not eat enough fiber. TSH normal 6/2024.  Orders:    Celiac Disease Panel; Future  -Check celiac disease panel.    -Recommend at least 64 oz water daily, and slowly increasing dietary fiber intake to 20-25 gm daily.  Provided handouts and discussed with patient.   -Patient would prefer to start with dietary management.  If symptoms persist, can consider medication management.  Lower abdominal pain  See above         Follow up 3 months.      History of Present Illness     Fransisca Angela is a 24 y.o. female who presents for follow up of GERD.    EGD 7/31/2023 showed irregular Z-line, mild patchy abnormal mucosa with linear furrows in the middle and lower thirds of the esophagus, gastritis.  Biopsies normal.    Patient reports after her EGD in 2023, she took omeprazole for a while, which helped with her indigestion and abdominal pain but did not help her dysphagia.  She then stopped the medication for a while.  Recently, her indigestion and abdominal pain have returned so she started omeprazole again but it is not helping like it used to, and her pain has gotten worse.  She also has persistent dysphagia, which she describes as difficulty initiating swallows.  This occurs with saliva, liquids, foods.  She already avoids GERD dietary triggers, limits EtOH, limits caffeine, and does not use NSAIDs often.  She denies any loss of appetite, nausea, vomiting, unintentional weight loss.    She also reports a history of chronic constipation.  She has 2 BMs/week on average and has intermittent episodes of worsening abdominal pain related to her bowel habits.  She denies any black or blood stools.  She drinks 3-4 bottles of water daily.  Admits she likely does not eat enough fiber.        Review of Systems   All other systems reviewed and are negative.          Objective     /70 (BP Location: Left arm, Patient Position: Sitting, Cuff Size: Large)   Pulse 58   Temp 97.7 °F (36.5 °C)  "(Tympanic)   Ht 5' 8\" (1.727 m)   Wt 125 kg (276 lb 6.4 oz)   LMP 10/13/2024 (Exact Date)   SpO2 99%   BMI 42.03 kg/m²     Physical Exam  Vitals reviewed.   Constitutional:       General: She is not in acute distress.     Appearance: She is well-developed.   HENT:      Head: Normocephalic and atraumatic.   Eyes:      Conjunctiva/sclera: Conjunctivae normal.   Cardiovascular:      Rate and Rhythm: Normal rate and regular rhythm.      Heart sounds: No murmur heard.  Pulmonary:      Effort: Pulmonary effort is normal. No respiratory distress.      Breath sounds: Normal breath sounds.   Abdominal:      General: Bowel sounds are normal. There is no distension.      Palpations: Abdomen is soft.      Tenderness: There is no abdominal tenderness. There is no guarding.   Musculoskeletal:         General: No swelling.      Cervical back: Neck supple.   Skin:     General: Skin is warm and dry.   Neurological:      Mental Status: She is alert.   Psychiatric:         Mood and Affect: Mood normal.       "

## 2024-10-18 NOTE — PATIENT INSTRUCTIONS
Fransisca Angela  10/18/79735     Recommended Total Fiber Intake**    AGE  MEN  WOMAN    19-50  38 grams/day  25 grams/day    Over 50  30 grams/day  21 grams/day    Fiber Sources in Common Foods   Use this guide to find out if you have enough fiber in you diet.    Food  Size of Serving  Fiber Grams/Servings  Calories/   Serving  Food  Size of Serving  Fiber Grams/Servings  Calories/   Serving    Fruits: (raw unless otherwise noted  Vegetables: (cooked, unless otherwise noted)    Apple (w/peel)  1 medium  3.7  81  Artichoke  1 globe  6.5  60    Apricots  1 cup  3.7  74  Asparagus  ½ cup  1.8  25    Banana  1 medium  2.7  105  Beans:    Blackberries  1 cup  7.2  75  Green (canned)  ½ cup  1.3  14    Blueberries  1 cup  3.9  81  Kidney  ½ cup  5.7  114    Cantaloupe  1 cup  1.3  56  Lima  ½ cup  6.1  85    Grapefruit  1 medium  2.8  82  Daily  ½ cup  7.4  118    Grapes  1 cup  1.6  114  White  ½ cup  5.5  122    Orange  1 medium  3.1  62  Beets  ½ cup  1.6  37    Pear (with peel)  1 medium  4.0  98  Broccoli  ½ cup  2.8  26    Pineapple  1 cup  1.9  76  Cabbage, green  ½ cup  2.1  16    Plums  1 medium  1.0  36  Cabbage, green (raw)  ½ cup  0.8  9    Prunes (dried)  1 cup  11.4  386  Carrots  ½ cup  2.6  35    Raspberries  1 cup  8.4  60  Cauliflower  ½ cup  2.0  17    Strawberries  1 cup  3.4  45  Cauliflower (raw)  ½ cup  1.3  13    Watermelon  1 slice  0.8  51  Celery (raw)  ½ cup  1.0  10    GRAIN PRODUCTS AND OTHERS:  Corn  ½ cup  2.0  66    Bread:  Cucumber (raw)  ½ cup  0.4  7    [place]ian  1 slice  0.8  68  Eggplant  ½ cup  1.2  13    Rye  1 slice  1.6  67  Green Peas  ½ cup  4.4  62    White  1 slice  0.6  67  Lettuce, iceberg (raw)  ½ cup  0.4  4    Whole Wheat  1 slice  2.0  70  Onions (raw)  ½ cup  1.4  30    Cereal:  Potato (baked with skin)  ½ cup  1.5  66    Bran  1 ounce  9.7  70  Spinach  ½ cup  2.7  25    Corn Flakes  1 ounce  1.0  110  Tomato  ½ cup  1.0  19    Oat Bran  1 ounce  4.3  69  Zucchini  ½  cup  1.3  14    Oatmeal  1 ounce  3.0  109  METAMUCIL:    Shredded Wheat  1 ounce  2.8  102  Capsules  6 capsules  3.0  10    Crackers:  Smooth Texture Orange (sugar free)  1 tsp  3.0  20    Jonathon  1 square  0.1  27  Smooth Texture Orange (with sugar)  1 tbsp  3.0  45    Saltine  1 regular  0.1  13  Wafers  2 wafers  3.0  120    Rice:    Brown  ½ cup  1.8  108    White  ½ cup  0.3  103    Spaghetti  2 ounces  2.1  225    Almonds (roasted)  ½ cup  6.4  351    Peanuts (roasted)  ½ cup  6.1  388      ** [place] [employer], [employer] Sciences, 2002   Track your fiber intake for five days. Use the Fiber Source Guide to find out how much fiber is in common food.     If you’re not getting your recommended amount of fiber each day, talk to your doctor about how you can increase the fiber in your diet. Example  Monday Tuesday Wednesday Thursday Friday    Food  Oatmeal    Fiber Grams  2.8    Food  Blueberries    Fiber Grams  3.9    Food  W.W. Bread    Fiber Grams  1.9    Food  W.W. Bread    Fiber Grams  1.9    Food  Apple    Fiber Grams  3.7    Food  Spaghetti    Fiber Grams  .14    Food  Corn    Fiber Grams  2.0    Food  White Bread    Fiber Grams  .6    Food    Fiber Grams    Food    Fiber Grams    Food    Fiber Grams    Food    Fiber Grams    Food    Fiber Grams    Food    Fiber Grams    Food    Fiber Grams    Food    Fiber Grams    Food    Fiber Grams    Food    Fiber Grams    Food    Fiber Grams    Food    Fiber Grams    Add numbers in each column to find your daily fiber intake.    Total Daily Fiber Intake  18.2      Too Low - Like most Americans, this example is not enough fiber. Talk to your doctor about how to add fiber to your diet.   Quick Fiber Facts    Most Americans consume only about half of the recommended fiber they need each day.    Fiber helps maintain normal bowel function, and helps prevent constipation and its potential complications. Straining and pressure from constipation may  lead to diverticular disease and hemorrhoids.    Stool softeners or stimulant laxatives only offer short-term relief of constipation, while dietary changes or fiber therapies help break the cycle of irregularity.    Diets low in saturated fat and cholesterol that include 7 grams of soluble fiber per day from psyllium husk, as in Metamucil, may reduce the risk of heart disease by lowering cholesterol. One adult dose of Metamucil has 2.4 grams of this soluable fiber.    Increase fiber intake gradually, giving the body time to adjust.

## 2024-10-18 NOTE — PROGRESS NOTES
Assessment/Plan:       1. Chronic bilateral low back pain without sciatica  XR spine lumbar minimum 4 views non injury      2. Chronic bilateral thoracic back pain  Ambulatory Referral to Chiropractic    XR spine thoracic 2 vw          Review of Diagnostic Studies and Office Notes:    Notes from the patient's recent PCP visit were reviewed prior to the chiropractic evaluation today.      Decision and Treatment Plan:    I reviewed my findings with the patient today.  Patient was interested in receiving chiropractic care and was treated today.  We will treat the patient 2x/week for the next three weeks and re-evaluate. If there is improvement in symptoms and objective findings, frequency will be reduced.       The patient will be treated with conservative chiropractic care including myofascial release, joint mobilization, and a home stretching and icing program.    Patient Instructions:    Return for treatment twice next week.   Ice 15 minutes as needed for posttreatment soreness.  X-rays were ordered of the patient's thoracic spine as well as lumbar spine.    TIME/Reviewed with Patient:    At least 33 minutes of time was spent with the patient during the consultation including the patient's history/current complaints, physical exam, reviewing the diagnostic report, reviewing home instruction/reducing risk factors with the patient, reviewing my findings with the patient, and discussing the treatment with the patient.     This is a separate identifiable portion of today's visit from the E and M code billed.    Treatment today consisted of myofascial release to the thoracic paraspinals, lumbar paraspinals, middle trapezius, rhomboids.  Manipulation was performed to the right SI joint, L5-S1 utilizing Stewart drop technique.  Manipulation was performed to the thoracic spine at T3-4-5, T6-7 utilizing a gentle double thenar mobilization.    Review of Systems    Subjective:      Fransisca Angela is a 24 y.o. female  "presents today for chiropractic evaluation and possible treatment.  She has a chief complaint of mid back pain and secondary complaint of lower back pain.  She states she has had the symptoms for approximately 3 years.  She states that about a year ago she did physical therapy symptoms persisted.  She states that earlier this year she had a breast reduction thinking that would help with her back pain but it did not seem to help much.  She does report getting intermittent tingling in the back of her thighs.  She has increased pain when standing for periods of time.  Pain is burning and achy with occasional sharp pain.  She denies trauma or injuries in the past to her back.    She denies fever, chills, night sweats, recent unexplained weight loss, changes in bowel/bladder habits, urinary incontinence or retention.    She was seen yesterday in the emergency department for chest pain.  Cardiac involvement was ruled out.    Objective:    Visit Vitals  /82   Ht 5' 8\" (1.727 m)   Wt 125 kg (276 lb)   LMP 10/13/2024 (Exact Date)   BMI 41.97 kg/m²   OB Status Having periods   Smoking Status Never   BSA 2.34 m²        Appearance: Well developed, well nourished, and in no acute distress    Alert and oriented x 3    Mood/Affect: calm and pleasant    Gait/Posture:    Gait: Normal    Posture: Rounding of the shoulders and anterior head carriage is noted with an increase in the mid/lower thoracic kyphosis.    Lordosis: Lumbar- normal    Kyphosis: Thoracic- increased    Lower extremity reflexes:    Deep tendon reflexes were normal and symmetrical in the bilateral lower extremities.    Lower Extremity Muscle Testing:    Muscle testing of the bilateral lower extremities was normal and graded +5/5.    Sensory:    Sensation to light touch was normal and symmetrical in the bilateral lower extremities.    Babinski was negative bilaterally.    Lumbar Orthopedic Tests:    Seated Straight Leg Raise was negative  on the " Right.    Seated Straight Leg Raise was negative  on the Left.    Supine Straight Leg Raise was negative  on the Right.    Supine Straight Leg Raise was negative  on the Left.    Lan Test was positive bilaterally.    Active Lumbar Ranges of Motion:    Lumbar range of motion examination shows flexion to be 20% restricted with pain in the thoracolumbar region.  Extension is 40% restricted with pain in the thoracolumbar region.  Left lateral bending is 25% restricted with pain in the thoracolumbar region.  Right lateral bending is 25% restricted with pain in the thoracolumbar region.  Rotation is painful in the thoracolumbar region.    Palpation:    Moderate/severe spasm and tenderness is noted in the thoracolumbar paraspinal musculature and lower thoracic paraspinal musculature.  This corresponds increased kyphosis in the area.  Moderate hypertonicity and tenderness is noted in the middle lower trapezius as well as rhomboids.  Moderate hypertonicity is noted in the lumbar paraspinals quadratus lumborum bilaterally.    Joint dysfunction is present at T4-5, T8-T9, T10-11, L5-S1, right sacroiliac joint.  Pelvic obliquity is noted with anterior rotation of the right innominate and posterior rotation of the left.       Dictation voice to text software has been used in the creation of this document. Please consider this in light of any contextual or grammatical errors.

## 2024-10-24 ENCOUNTER — APPOINTMENT (OUTPATIENT)
Dept: LAB | Facility: HOSPITAL | Age: 24
End: 2024-10-24
Payer: COMMERCIAL

## 2024-10-24 DIAGNOSIS — K59.00 CONSTIPATION, UNSPECIFIED CONSTIPATION TYPE: ICD-10-CM

## 2024-10-24 LAB — IGA SERPL-MCNC: 184 MG/DL (ref 66–433)

## 2024-10-24 PROCEDURE — 36415 COLL VENOUS BLD VENIPUNCTURE: CPT

## 2024-10-24 PROCEDURE — 82784 ASSAY IGA/IGD/IGG/IGM EACH: CPT

## 2024-10-24 PROCEDURE — 86258 DGP ANTIBODY EACH IG CLASS: CPT

## 2024-10-24 PROCEDURE — 86364 TISS TRNSGLTMNASE EA IG CLAS: CPT

## 2024-10-25 ENCOUNTER — PROCEDURE VISIT (OUTPATIENT)
Age: 24
End: 2024-10-25
Payer: COMMERCIAL

## 2024-10-25 VITALS — WEIGHT: 276 LBS | HEIGHT: 68 IN | BODY MASS INDEX: 41.83 KG/M2

## 2024-10-25 DIAGNOSIS — M54.50 CHRONIC BILATERAL LOW BACK PAIN WITHOUT SCIATICA: ICD-10-CM

## 2024-10-25 DIAGNOSIS — G89.29 CHRONIC BILATERAL LOW BACK PAIN WITHOUT SCIATICA: ICD-10-CM

## 2024-10-25 DIAGNOSIS — M54.6 CHRONIC BILATERAL THORACIC BACK PAIN: Primary | ICD-10-CM

## 2024-10-25 DIAGNOSIS — G89.29 CHRONIC BILATERAL THORACIC BACK PAIN: Primary | ICD-10-CM

## 2024-10-25 LAB
GLIADIN PEPTIDE IGA SER-ACNC: <0.2 U/ML
GLIADIN PEPTIDE IGA SER-ACNC: NEGATIVE
GLIADIN PEPTIDE IGG SER-ACNC: <0.4 U/ML
GLIADIN PEPTIDE IGG SER-ACNC: NEGATIVE
TTG IGA SER-ACNC: <0.5 U/ML
TTG IGA SER-ACNC: NEGATIVE
TTG IGG SER-ACNC: <0.8 U/ML
TTG IGG SER-ACNC: NEGATIVE

## 2024-10-25 PROCEDURE — 98941 CHIROPRACT MANJ 3-4 REGIONS: CPT | Performed by: CHIROPRACTOR

## 2024-10-25 NOTE — PROGRESS NOTES
Assessment:     1. Chronic bilateral thoracic back pain        2. Chronic bilateral low back pain without sciatica            Condition is the same    PLAN/TREATMENT:    Return for treatment twice next week.   Continue using ice as needed for post treatment soreness.   Treatment:  Myofascial release was performed to the thoracic paraspinals, middle trapezius, rhomboids bilaterally.  Myofascial release was performed to the lumbar paraspinals, left quadratus lumborum, gluteus medius bilaterally.    Manipulation was performed to the left sacroiliac joint, L5-S1 utilizing Stewart drop technique and gentle side-lying flexion distraction.  This is performed of the thoracic spine at  T4-5, T8-T9, T9-10 utilizing a posterior to anterior mobilization and activator technique.  No HVLA was performed.    Subjective:  Fransisca is here today with ongoing complaints of LBP and mid back pain.     Objective:  Moderate/severe spasm and tenderness is noted in the thoracolumbar paraspinal musculature and lower thoracic paraspinal musculature.  This corresponds increased kyphosis in the area.  Moderate hypertonicity and tenderness is noted in the middle lower trapezius as well as rhomboids.  Moderate hypertonicity is noted in the lumbar paraspinals quadratus lumborum bilaterally.     Joint dysfunction is present at T4-5, T8-T9, T10-11, L5-S1, right sacroiliac joint.  Pelvic obliquity is noted with anterior rotation of the right innominate and posterior rotation of the left.     .

## 2024-10-30 ENCOUNTER — PROCEDURE VISIT (OUTPATIENT)
Age: 24
End: 2024-10-30
Payer: COMMERCIAL

## 2024-10-30 VITALS — HEIGHT: 68 IN | WEIGHT: 276 LBS | BODY MASS INDEX: 41.83 KG/M2

## 2024-10-30 DIAGNOSIS — M54.6 CHRONIC BILATERAL THORACIC BACK PAIN: Primary | ICD-10-CM

## 2024-10-30 DIAGNOSIS — G89.29 CHRONIC BILATERAL THORACIC BACK PAIN: Primary | ICD-10-CM

## 2024-10-30 DIAGNOSIS — M54.50 CHRONIC BILATERAL LOW BACK PAIN WITHOUT SCIATICA: ICD-10-CM

## 2024-10-30 DIAGNOSIS — G89.29 CHRONIC BILATERAL LOW BACK PAIN WITHOUT SCIATICA: ICD-10-CM

## 2024-10-30 PROCEDURE — 98941 CHIROPRACT MANJ 3-4 REGIONS: CPT | Performed by: CHIROPRACTOR

## 2024-10-30 NOTE — PROGRESS NOTES
Assessment:     1. Chronic bilateral thoracic back pain        2. Chronic bilateral low back pain without sciatica            Condition is the same    PLAN/TREATMENT:    Return for treatment later this week.   Continue using ice as needed for post treatment soreness.   Treatment:  Myofascial release was performed to the thoracic paraspinals, middle trapezius, rhomboids bilaterally.  Myofascial release was performed to the lumbar paraspinals, left quadratus lumborum, gluteus medius bilaterally.    Manipulation was performed to the left sacroiliac joint, L5-S1 utilizing Stewart drop technique and gentle side-lying flexion distraction.  This is performed of the thoracic spine at  T4-5, T8-T9, T9-10 utilizing a posterior to anterior mobilization and activator technique.  No HVLA was performed.    Subjective:  Fransisca is here today with ongoing complaints of LBP and mid back pain. No soreness after last visit.  Back feels tight today.     Objective:  Moderate/severe spasm and tenderness is noted in the thoracolumbar paraspinal musculature and lower thoracic paraspinal musculature.  This corresponds increased kyphosis in the area.  Moderate hypertonicity and tenderness is noted in the middle lower trapezius as well as rhomboids.  Moderate hypertonicity is noted in the lumbar paraspinals quadratus lumborum bilaterally.     Joint dysfunction is present at T4-5, T8-T9, T10-11, L5-S1, right sacroiliac joint.  Pelvic obliquity is noted with anterior rotation of the right innominate and posterior rotation of the left.     .

## 2024-11-01 ENCOUNTER — PROCEDURE VISIT (OUTPATIENT)
Age: 24
End: 2024-11-01
Payer: COMMERCIAL

## 2024-11-01 VITALS — WEIGHT: 276 LBS | BODY MASS INDEX: 41.83 KG/M2 | HEIGHT: 68 IN

## 2024-11-01 DIAGNOSIS — G89.29 CHRONIC BILATERAL THORACIC BACK PAIN: Primary | ICD-10-CM

## 2024-11-01 DIAGNOSIS — G89.29 CHRONIC BILATERAL LOW BACK PAIN WITHOUT SCIATICA: ICD-10-CM

## 2024-11-01 DIAGNOSIS — M54.6 CHRONIC BILATERAL THORACIC BACK PAIN: Primary | ICD-10-CM

## 2024-11-01 DIAGNOSIS — M54.50 CHRONIC BILATERAL LOW BACK PAIN WITHOUT SCIATICA: ICD-10-CM

## 2024-11-01 PROCEDURE — 98941 CHIROPRACT MANJ 3-4 REGIONS: CPT | Performed by: CHIROPRACTOR

## 2024-11-01 NOTE — PROGRESS NOTES
Assessment:     1. Chronic bilateral thoracic back pain        2. Chronic bilateral low back pain without sciatica            Condition is the same    PLAN/TREATMENT:    Return for treatment twice next week.   Continue using ice as needed for post treatment soreness.     Treatment:  Myofascial release was performed to the thoracic paraspinals, middle trapezius, rhomboids bilaterally.  Myofascial release was performed to the lumbar paraspinals, left quadratus lumborum, gluteus medius bilaterally.    Manipulation was performed to the left sacroiliac joint, L5-S1 utilizing Stewart drop technique and gentle side-lying flexion distraction.  This is performed of the thoracic spine at  T4-5, T8-T9, T9-10 utilizing a posterior to anterior mobilization and activator technique.  No HVLA was performed.    Subjective:  Fransisca is here today with ongoing complaints of LBP and mid back pain. She feels the same as last visit.      Objective:  Moderate/severe spasm and tenderness is noted in the thoracolumbar paraspinal musculature and lower thoracic paraspinal musculature.  This corresponds increased kyphosis in the area.  Moderate hypertonicity and tenderness is noted in the middle lower trapezius as well as rhomboids.  Moderate hypertonicity is noted in the lumbar paraspinals quadratus lumborum bilaterally.     Joint dysfunction is present at T4-5, T8-T9, T10-11, L5-S1, right sacroiliac joint.  Pelvic obliquity is noted with anterior rotation of the right innominate and posterior rotation of the left.     .

## 2024-11-07 ENCOUNTER — PROCEDURE VISIT (OUTPATIENT)
Age: 24
End: 2024-11-07
Payer: COMMERCIAL

## 2024-11-07 VITALS — WEIGHT: 276 LBS | BODY MASS INDEX: 41.83 KG/M2 | HEIGHT: 68 IN

## 2024-11-07 DIAGNOSIS — M79.18 MYOFASCIAL PAIN: ICD-10-CM

## 2024-11-07 DIAGNOSIS — G89.29 CHRONIC BILATERAL LOW BACK PAIN WITHOUT SCIATICA: ICD-10-CM

## 2024-11-07 DIAGNOSIS — M54.50 CHRONIC BILATERAL LOW BACK PAIN WITHOUT SCIATICA: ICD-10-CM

## 2024-11-07 DIAGNOSIS — G89.29 CHRONIC BILATERAL THORACIC BACK PAIN: Primary | ICD-10-CM

## 2024-11-07 DIAGNOSIS — M54.6 CHRONIC BILATERAL THORACIC BACK PAIN: Primary | ICD-10-CM

## 2024-11-07 PROCEDURE — 98941 CHIROPRACT MANJ 3-4 REGIONS: CPT | Performed by: CHIROPRACTOR

## 2024-11-07 NOTE — PROGRESS NOTES
Assessment:     1. Chronic bilateral thoracic back pain        2. Chronic bilateral low back pain without sciatica        3. Myofascial pain            Condition is the same    PLAN/TREATMENT:    Return for treatment later this week.   Continue using ice as needed for post treatment soreness.     Treatment:  Myofascial release was performed to the thoracic paraspinals, middle trapezius, rhomboids bilaterally.  Myofascial release was performed to the lumbar paraspinals, left quadratus lumborum, gluteus medius bilaterally.  GT-4 was used to thoracic and thoracolumbar paraspinals.     Manipulation was performed to the left sacroiliac joint, L5-S1 utilizing Stewart drop technique and gentle side-lying flexion distraction.  This is performed of the thoracic spine at  T4-5, T8-T9, T9-10 utilizing a posterior to anterior mobilization and activator technique.  No HVLA was performed.    Subjective:  Fransisca is here today with ongoing complaints of LBP and mid back pain. She feels the same as last visit.      Objective:  Moderate/severe spasm and tenderness is noted in the thoracolumbar paraspinal musculature and lower thoracic paraspinal musculature.  This corresponds increased kyphosis in the area.  Moderate hypertonicity and tenderness is noted in the middle lower trapezius as well as rhomboids.  Moderate hypertonicity is noted in the lumbar paraspinals quadratus lumborum bilaterally.     Joint dysfunction is present at T4-5, T8-T9, T10-11, L5-S1, right sacroiliac joint.  Pelvic obliquity is noted with anterior rotation of the right innominate and posterior rotation of the left.     .

## 2024-11-08 ENCOUNTER — PROCEDURE VISIT (OUTPATIENT)
Age: 24
End: 2024-11-08
Payer: COMMERCIAL

## 2024-11-08 VITALS — BODY MASS INDEX: 41.83 KG/M2 | HEIGHT: 68 IN | WEIGHT: 276 LBS

## 2024-11-08 DIAGNOSIS — G89.29 CHRONIC BILATERAL LOW BACK PAIN WITHOUT SCIATICA: ICD-10-CM

## 2024-11-08 DIAGNOSIS — M54.6 CHRONIC BILATERAL THORACIC BACK PAIN: Primary | ICD-10-CM

## 2024-11-08 DIAGNOSIS — G89.29 CHRONIC BILATERAL THORACIC BACK PAIN: Primary | ICD-10-CM

## 2024-11-08 DIAGNOSIS — M79.18 MYOFASCIAL PAIN: ICD-10-CM

## 2024-11-08 DIAGNOSIS — M54.50 CHRONIC BILATERAL LOW BACK PAIN WITHOUT SCIATICA: ICD-10-CM

## 2024-11-08 PROCEDURE — 98941 CHIROPRACT MANJ 3-4 REGIONS: CPT | Performed by: CHIROPRACTOR

## 2024-11-08 RX ORDER — BENZOYL PEROXIDE 10 G/100G
SUSPENSION TOPICAL
COMMUNITY
Start: 2024-09-27

## 2024-11-08 RX ORDER — CLOBETASOL PROPIONATE 0.5 MG/G
CREAM TOPICAL
COMMUNITY
Start: 2024-09-27 | End: 2024-11-16

## 2024-11-08 NOTE — PROGRESS NOTES
Assessment:     1. Chronic bilateral thoracic back pain        2. Chronic bilateral low back pain without sciatica        3. Myofascial pain            Condition is slightly better.     PLAN/TREATMENT:    Return for treatment twice next week.   Continue using ice as needed for post treatment soreness.     Treatment:  Myofascial release was performed to the thoracic paraspinals, middle trapezius, rhomboids bilaterally.  Myofascial release was performed to the lumbar paraspinals, left quadratus lumborum, gluteus medius bilaterally.  GT-4 was used to thoracic and thoracolumbar paraspinals.     Manipulation was performed to the left sacroiliac joint, L5-S1 utilizing Stewart drop technique and gentle side-lying flexion distraction.  This is performed of the thoracic spine at  T4-5, T8-T9, T9-10 utilizing a posterior to anterior mobilization and activator technique.  No HVLA was performed.    Subjective:  Fransisca is here today with ongoing complaints of LBP and mid back pain. She feels some improvement with a little less pain overall since last visit.         Objective:  Moderate spasm and tenderness is noted in the thoracolumbar paraspinal musculature and lower thoracic paraspinal musculature.  This corresponds increased kyphosis in the area.  Moderate hypertonicity and tenderness is noted in the middle lower trapezius as well as rhomboids.  Moderate hypertonicity is noted in the lumbar paraspinals quadratus lumborum bilaterally.     Joint dysfunction is present at T4-5, T8-T9, T10-11, L5-S1, right sacroiliac joint.  Pelvic obliquity is noted with anterior rotation of the right innominate and posterior rotation of the left.     .

## 2024-11-15 ENCOUNTER — PROCEDURE VISIT (OUTPATIENT)
Age: 24
End: 2024-11-15
Payer: COMMERCIAL

## 2024-11-15 VITALS — BODY MASS INDEX: 41.83 KG/M2 | WEIGHT: 276 LBS | HEIGHT: 68 IN

## 2024-11-15 DIAGNOSIS — M79.18 MYOFASCIAL PAIN: ICD-10-CM

## 2024-11-15 DIAGNOSIS — G89.29 CHRONIC BILATERAL THORACIC BACK PAIN: Primary | ICD-10-CM

## 2024-11-15 DIAGNOSIS — M54.6 CHRONIC BILATERAL THORACIC BACK PAIN: Primary | ICD-10-CM

## 2024-11-15 DIAGNOSIS — M54.50 CHRONIC BILATERAL LOW BACK PAIN WITHOUT SCIATICA: ICD-10-CM

## 2024-11-15 DIAGNOSIS — G89.29 CHRONIC BILATERAL LOW BACK PAIN WITHOUT SCIATICA: ICD-10-CM

## 2024-11-15 PROCEDURE — 98941 CHIROPRACT MANJ 3-4 REGIONS: CPT | Performed by: CHIROPRACTOR

## 2024-11-15 NOTE — PROGRESS NOTES
Assessment:     1. Chronic bilateral thoracic back pain        2. Chronic bilateral low back pain without sciatica        3. Myofascial pain            Condition in improving slowly.     PLAN/TREATMENT:    Return for treatment twice next week.   Continue using ice as needed for post treatment soreness.     Treatment:  Myofascial release was performed to the thoracic paraspinals, middle trapezius, rhomboids bilaterally.  Myofascial release was performed to the lumbar paraspinals, left quadratus lumborum, gluteus medius bilaterally.  GT-4 was used to thoracic and thoracolumbar paraspinals.     Manipulation was performed to the left sacroiliac joint, L5-S1 utilizing Stewart drop technique and gentle side-lying flexion distraction.  This is performed of the thoracic spine at  T4-5, T8-T9, T9-10 utilizing a posterior to anterior mobilization and activator technique.  No HVLA was performed.    Subjective:  Fransisca is here today with ongoing complaints of LBP and mid back pain. She feels a little better, feels 15-20% better.     Objective:  Mild/moderate spasm and tenderness is noted in the thoracolumbar paraspinal musculature and lower thoracic paraspinal musculature.  This corresponds increased kyphosis in the area.  Moderate hypertonicity and tenderness is noted in the middle lower trapezius as well as rhomboids.  Moderate hypertonicity is noted in the lumbar paraspinals quadratus lumborum bilaterally.     Joint dysfunction is present at T4-5, T8-T9, T10-11, L5-S1, right sacroiliac joint.  Pelvic obliquity is noted with anterior rotation of the right innominate and posterior rotation of the left.     .

## 2024-11-16 ENCOUNTER — ANNUAL EXAM (OUTPATIENT)
Dept: OBGYN CLINIC | Facility: CLINIC | Age: 24
End: 2024-11-16

## 2024-11-16 VITALS
WEIGHT: 274.6 LBS | HEIGHT: 68 IN | BODY MASS INDEX: 41.62 KG/M2 | HEART RATE: 76 BPM | DIASTOLIC BLOOD PRESSURE: 80 MMHG | SYSTOLIC BLOOD PRESSURE: 118 MMHG

## 2024-11-16 DIAGNOSIS — Z01.419 ENCOUNTER FOR GYNECOLOGICAL EXAMINATION WITHOUT ABNORMAL FINDING: Primary | ICD-10-CM

## 2024-11-16 DIAGNOSIS — Z12.4 SCREENING FOR CERVICAL CANCER: ICD-10-CM

## 2024-11-16 DIAGNOSIS — Z12.39 ENCOUNTER FOR BREAST CANCER SCREENING USING NON-MAMMOGRAM MODALITY: ICD-10-CM

## 2024-11-16 DIAGNOSIS — Z13.31 POSITIVE DEPRESSION SCREENING: ICD-10-CM

## 2024-11-16 PROBLEM — M54.2 NECK PAIN: Status: RESOLVED | Noted: 2023-12-08 | Resolved: 2024-11-16

## 2024-11-16 PROBLEM — N62 MACROMASTIA: Status: RESOLVED | Noted: 2022-12-03 | Resolved: 2024-11-16

## 2024-11-16 PROBLEM — M54.6 CHRONIC BILATERAL THORACIC BACK PAIN: Status: RESOLVED | Noted: 2022-12-03 | Resolved: 2024-11-16

## 2024-11-16 PROBLEM — G89.29 CHRONIC BILATERAL THORACIC BACK PAIN: Status: RESOLVED | Noted: 2022-12-03 | Resolved: 2024-11-16

## 2024-11-16 PROBLEM — Z98.890 S/P BILATERAL BREAST REDUCTION: Status: RESOLVED | Noted: 2024-04-24 | Resolved: 2024-11-16

## 2024-11-16 PROCEDURE — 99395 PREV VISIT EST AGE 18-39: CPT | Performed by: NURSE PRACTITIONER

## 2024-11-16 NOTE — PATIENT INSTRUCTIONS
Thank you for your confidence in our team.   We appreciate you and welcome your feedback.   If you receive a survey from us, please take a few moments to let us know how we are doing.   Sincerely,  CYRUS Franz       OBESITY     Obesity is defined as a body mass index (BMI) which is greater than 30. Your Body mass index is 41.75 kg/m²..    The risks of obesity include  many health problems, such as injuries or physical disability. You may need tests to check for the following:  Diabetes     High blood pressure or high cholesterol     Heart disease     Gallbladder or liver disease     Cancer of the colon, breast, prostate, liver, or kidney     Sleep apnea     Arthritis or gout    Seek care immediately if:   You have a severe headache, confusion, or difficulty speaking.     You have weakness on one side of your body.     You have chest pain, sweating, or shortness of breath.    Contact your healthcare provider if:   You have symptoms of gallbladder or liver disease, such as pain in your upper abdomen.    You have knee or hip pain and discomfort while walking.     You have symptoms of diabetes, such as intense hunger and thirst, and frequent urination.     You have symptoms of sleep apnea, such as snoring or daytime sleepiness.     You have questions or concerns about your condition or care.    Treatment for obesity  focuses on helping you lose weight to improve your health. Even a small decrease in BMI can reduce the risk for many health problems. Your healthcare provider will help you set a weight-loss goal.  Lifestyle changes  are the first step in treating obesity. These include making healthy food choices and getting regular physical activity. Your healthcare provider may suggest a weight-loss program that involves coaching, education, and therapy.     Medicine  may help you lose weight when it is used with a healthy diet and physical activity.     Surgery  can help you lose weight if you are very  obese and have other health problems. There are several types of weight-loss surgery. Ask your healthcare provider for more information.    Be successful losing weight:   Set small, realistic goals.  An example of a small goal is to walk for 20 minutes 5 days a week. Anther goal is to lose 5% of your body weight.    Tell friends, family members, and coworkers about your goals  and ask for their support. Ask a friend to lose weight with you, or join a weight-loss support group.    Identify foods or triggers that may cause you to overeat , and find ways to avoid them. Remove tempting high-calorie foods from your home and workplace. Place a bowl of fresh fruit on your kitchen counter. If stress causes you to eat, then find other ways to cope with stress.     Keep a diary to track what you eat and drink.  Also write down how many minutes of physical activity you do each day. Weigh yourself once a week and record it in your diary.    Eating changes:  You will need to eat 500 to 1,000 fewer calories each day than you currently eat to lose 1 to 2 pounds a week. The following changes will help you cut calories:  Eat smaller portions.  Use small plates, no larger than 9 inches in diameter. Fill your plate half full of fruits and vegetables. Measure your food using measuring cups until you know what a serving size looks like.     Eat 3 meals and 1 or 2 snacks each day.  Plan your meals in advance. Cook and eat at home most of the time. Eat slowly.     Eat fruits and vegetables at every meal.  They are low in calories and high in fiber, which makes you feel full. Do not add butter, margarine, or cream sauce to vegetables. Use herbs to season steamed vegetables.     Eat less fat and fewer fried foods.  Eat more baked or grilled chicken and fish. These protein sources are lower in calories and fat than red meat. Limit fast food. Dress your salads with olive oil and vinegar instead of bottled dressing.     Limit the amount of  sugar you eat.  Do not drink sugary beverages. Limit alcohol.    Activity changes:  Physical activity is good for your body in many ways. It helps you burn calories and build strong muscles. It decreases stress and depression, and improves your mood. It can also help you sleep better. Talk to your healthcare provider before you begin an exercise program.  Exercise for at least 30 minutes 5 days a week.  Start slowly. Set aside time each day for physical activity that you enjoy and that is convenient for you. It is best to do both weight training and an activity that increases your heart rate, such as walking, bicycling, or swimming.     Find ways to be more active.  Do yard work and housecleaning. Walk up the stairs instead of using elevators. Spend your leisure time going to events that require walking, such as outdoor festivals or fairs. This extra physical activity can help you lose weight and keep it off.    Follow up with your primary healthcare provider as directed.  You may need to meet with a dietitian. Write down your questions so you remember to ask them during your visits.

## 2024-11-16 NOTE — PROGRESS NOTES
ANNUAL GYNECOLOGICAL EXAMINATION    Fransisca Angela is a 24 y.o. female who presents today for annual GYN exam.  Her last pap smear was performed 10/11/2023 and result was NILM.  She reports no history of abnormal pap smears in her past.  She had HIV screening performed 2022 and it was negative.  She reports menses as regular.  Patient's last menstrual period was 2024 (exact date).  Her general medical history has been reviewed and she reports it as follows:    Past Medical History:   Diagnosis Date    GERD (gastroesophageal reflux disease)      Past Surgical History:   Procedure Laterality Date    MT BREAST REDUCTION Bilateral 04/10/2024    Procedure: BILATERAL BREAST REDUCTION;  Surgeon: Donnie Amezquita MD;  Location:  MAIN OR;  Service: Plastics    WISDOM TOOTH EXTRACTION       OB History          0    Para   0    Term   0       0    AB   0    Living   0         SAB   0    IAB   0    Ectopic   0    Multiple   0    Live Births   0               Social History     Tobacco Use    Smoking status: Never     Passive exposure: Never    Smokeless tobacco: Never   Vaping Use    Vaping status: Never Used   Substance Use Topics    Alcohol use: Yes     Comment: couple times/year    Drug use: Never     Social History     Substance and Sexual Activity   Sexual Activity Not Currently    Partners: Male    Birth control/protection: None     Cancer-related family history is negative for Breast cancer, Colon cancer, Ovarian cancer, and Cancer.    Current Outpatient Medications   Medication Instructions    Benzoyl Peroxide 10 % LIQD     pantoprazole (PROTONIX) 40 mg, Oral, Daily before breakfast       Review of Systems:  Review of Systems   Constitutional: Negative.    Gastrointestinal: Negative.    Genitourinary:  Negative for difficulty urinating, menstrual problem, pelvic pain and vaginal discharge.   Skin: Negative.        Physical Exam:  /80 (BP Location: Right arm, Patient Position: Sitting)    "Pulse 76   Ht 5' 8\" (1.727 m)   Wt 125 kg (274 lb 9.6 oz)   LMP 11/07/2024 (Exact Date)   BMI 41.75 kg/m²   Physical Exam  Constitutional:       General: She is not in acute distress.     Appearance: She is well-developed.   Genitourinary:      Vulva normal.      No lesions in the vagina.        Right Adnexa: not tender and no mass present.     Left Adnexa: not tender and no mass present.     No cervical motion tenderness or lesion.      Uterus is not tender.   Breasts:     Right: No mass, nipple discharge, skin change or tenderness.      Left: No mass, nipple discharge, skin change or tenderness.   Neck:      Thyroid: No thyromegaly.   Cardiovascular:      Rate and Rhythm: Normal rate and regular rhythm.   Pulmonary:      Effort: Pulmonary effort is normal.   Abdominal:      Palpations: Abdomen is soft.      Tenderness: There is no abdominal tenderness.   Musculoskeletal:      Cervical back: Neck supple.   Neurological:      Mental Status: She is alert and oriented to person, place, and time.   Skin:     General: Skin is warm and dry.   Vitals reviewed.       Assessment/Plan:   1. Normal well-woman GYN exam.  2. Cervical cancer screening:  Normal cervical exam.  Pap smear not indicated at this time.  Has received full HPV vaccine series in the past.   3. STD screening:  Patient declines.   4. Breast cancer screening:  Normal breast exam.  Reviewed breast self-awareness.   5. Depression Screening: Patient's depression screening was assessed with a PHQ-2 score of 4. Their PHQ-9 score was 20. Patient assessed for underlying major depression. They have no active suicidal ideations. Brief counseling provided and recommend additional follow-up/re-evaluation next office visit.  Referral placed for  consultation.   6. BMI Counseling: Body mass index is 41.75 kg/m². Discussed the patient's BMI with her. The BMI is above normal. Nutrition recommendations include reducing portion sizes and decreasing " overall calorie intake.   7. Contraception:  Declines.   8. Return to office in 1 year for annual GYN exam.    Reviewed with patient that test results are available in [employer] immediately, but that they will not necessarily be reviewed by me immediately.  Explained that I will review results at my earliest opportunity and contact patient appropriately.

## 2024-11-18 ENCOUNTER — PATIENT OUTREACH (OUTPATIENT)
Dept: OBGYN CLINIC | Facility: CLINIC | Age: 24
End: 2024-11-18

## 2024-11-18 NOTE — PROGRESS NOTES
JOSE ANGEL FORBES attempted to contact pt to address needs x2. Pt did not answer JOSE ANGEL FORBES left a voicemail for a return call and will try again at another time.    Pt returned call.     Pt is a 25yo Single  Female primary language is English. JOSE ANGEL FORBES inquired about depression. Pt reports a hx of depression but denies SI and SIB. Pt has denied speaking with PCP about depression concerns and no BH support at this time. Pt lives with her mother who she identifies as a supportive listener. Pt reports interest in therapy services. JOSE ANGEL FORBES spoke with pt about several resources in the [place] area. JOSE ANGEL FORBES received pt email address and emailed pt resources to contact independently.     JOSE ANGEL FORBES will f/u with pt next week regarding intake appointment and if pt needs additional assistance with scheduling.     JOSE ANGEL FORBES encouraged pt to contact JOSE ANGEL FORBES for additional support if needed.

## 2024-11-20 ENCOUNTER — PROCEDURE VISIT (OUTPATIENT)
Age: 24
End: 2024-11-20
Payer: COMMERCIAL

## 2024-11-20 VITALS — WEIGHT: 274 LBS | HEIGHT: 68 IN | BODY MASS INDEX: 41.52 KG/M2

## 2024-11-20 DIAGNOSIS — M79.18 MYOFASCIAL PAIN: ICD-10-CM

## 2024-11-20 DIAGNOSIS — M54.6 CHRONIC BILATERAL THORACIC BACK PAIN: Primary | ICD-10-CM

## 2024-11-20 DIAGNOSIS — G89.29 CHRONIC BILATERAL THORACIC BACK PAIN: Primary | ICD-10-CM

## 2024-11-20 DIAGNOSIS — M54.50 CHRONIC BILATERAL LOW BACK PAIN WITHOUT SCIATICA: ICD-10-CM

## 2024-11-20 DIAGNOSIS — G89.29 CHRONIC BILATERAL LOW BACK PAIN WITHOUT SCIATICA: ICD-10-CM

## 2024-11-20 PROCEDURE — 98940 CHIROPRACT MANJ 1-2 REGIONS: CPT | Performed by: CHIROPRACTOR

## 2024-11-20 NOTE — PROGRESS NOTES
Assessment:     1. Chronic bilateral thoracic back pain        2. Chronic bilateral low back pain without sciatica        3. Myofascial pain            Condition in improving slowly.     PLAN/TREATMENT:    No follow-ups on file.   Continue using ice as needed for post treatment soreness.     Treatment:  Myofascial release was performed to the thoracic paraspinals, middle trapezius, rhomboids bilaterally.  Myofascial release was performed to the lumbar paraspinals, left quadratus lumborum, gluteus medius bilaterally.  GT-4 was used to thoracic and thoracolumbar paraspinals.     Manipulation was performed to the left sacroiliac joint, L5-S1 utilizing Stewart drop technique and gentle side-lying flexion distraction.  This is performed of the thoracic spine at  T4-5, T8-T9, T9-10 utilizing a posterior to anterior mobilization and activator technique.  No HVLA was performed.    Subjective:  Fransisca is here today with ongoing complaints of LBP and mid back pain. She felt better over the weekend.  Monday while at work pain increased in mid and lower back.      Objective:  Mild/moderate spasm and tenderness is noted in the thoracolumbar paraspinal musculature and lower thoracic paraspinal musculature.  This corresponds increased kyphosis in the area.  Moderate hypertonicity and tenderness is noted in the middle lower trapezius as well as rhomboids.  Moderate hypertonicity is noted in the lumbar paraspinals quadratus lumborum bilaterally.     Joint dysfunction is present at T4-5, T8-T9, T10-11, L5-S1, right sacroiliac joint.  Pelvic obliquity is noted with anterior rotation of the right innominate and posterior rotation of the left.     .

## 2024-11-22 ENCOUNTER — PROCEDURE VISIT (OUTPATIENT)
Age: 24
End: 2024-11-22
Payer: COMMERCIAL

## 2024-11-22 VITALS — HEIGHT: 68 IN | BODY MASS INDEX: 41.52 KG/M2 | WEIGHT: 274 LBS

## 2024-11-22 DIAGNOSIS — M79.18 MYOFASCIAL PAIN: ICD-10-CM

## 2024-11-22 DIAGNOSIS — M54.6 CHRONIC BILATERAL THORACIC BACK PAIN: Primary | ICD-10-CM

## 2024-11-22 DIAGNOSIS — M54.50 CHRONIC BILATERAL LOW BACK PAIN WITHOUT SCIATICA: ICD-10-CM

## 2024-11-22 DIAGNOSIS — G89.29 CHRONIC BILATERAL LOW BACK PAIN WITHOUT SCIATICA: ICD-10-CM

## 2024-11-22 DIAGNOSIS — G89.29 CHRONIC BILATERAL THORACIC BACK PAIN: Primary | ICD-10-CM

## 2024-11-22 PROCEDURE — 98941 CHIROPRACT MANJ 3-4 REGIONS: CPT | Performed by: CHIROPRACTOR

## 2024-11-22 NOTE — PROGRESS NOTES
Assessment:     1. Chronic bilateral thoracic back pain        2. Chronic bilateral low back pain without sciatica        3. Myofascial pain            Condition in improving.     PLAN/TREATMENT:    Return for treatment twice next week.   Continue using ice as needed for post treatment soreness.     Treatment:  Myofascial release was performed to the thoracic paraspinals, middle trapezius, rhomboids bilaterally.  Myofascial release was performed to the lumbar paraspinals, left quadratus lumborum, gluteus medius bilaterally.  GT-4 was used to thoracic and thoracolumbar paraspinals.     Manipulation was performed to the left sacroiliac joint, L5-S1 utilizing Stewart drop technique and gentle side-lying flexion distraction.  This is performed of the thoracic spine at  T4-5, T8-T9, T9-10 utilizing a posterior to anterior mobilization and activator technique.  No HVLA was performed.    Subjective:  Fransisca is here today with ongoing complaints of LBP and mid back pain. She feels better than last visit.  Pain level decreased after last treatment.     Objective:  Mild/moderate spasm and tenderness is noted in the thoracolumbar paraspinal musculature and lower thoracic paraspinal musculature.  This corresponds increased kyphosis in the area.  Mild/moderate hypertonicity and tenderness is noted in the middle lower trapezius as well as rhomboids.  Mild/moderate hypertonicity is noted in the lumbar paraspinals quadratus lumborum bilaterally.     Joint dysfunction is present at T4-5, T8-T9, T10-11, L5-S1, right sacroiliac joint.  Pelvic obliquity is noted with anterior rotation of the right innominate and posterior rotation of the left.     .

## 2024-11-25 ENCOUNTER — PATIENT OUTREACH (OUTPATIENT)
Dept: OBGYN CLINIC | Facility: CLINIC | Age: 24
End: 2024-11-25

## 2024-11-25 NOTE — PROGRESS NOTES
"JOSE ANGEL FORBES contacted pt to f/u.     Pt reports that she contacted [facility] and that she had her intake session already. Pt reports that she has a follow up session this upcoming Sunday. Pt reports that it went very well and \"it was nice she listened to what I had to say and we will discuss my treatment plan next\" JOSE ANGEL FORBES will f/u with pt in a month regarding ongoing therapy.   "

## 2024-11-27 ENCOUNTER — OFFICE VISIT (OUTPATIENT)
Dept: URGENT CARE | Age: 24
End: 2024-11-27
Payer: COMMERCIAL

## 2024-11-27 VITALS
HEIGHT: 68 IN | HEART RATE: 77 BPM | OXYGEN SATURATION: 99 % | TEMPERATURE: 97.1 F | SYSTOLIC BLOOD PRESSURE: 126 MMHG | RESPIRATION RATE: 20 BRPM | WEIGHT: 274 LBS | BODY MASS INDEX: 41.52 KG/M2 | DIASTOLIC BLOOD PRESSURE: 62 MMHG

## 2024-11-27 DIAGNOSIS — J01.00 ACUTE MAXILLARY SINUSITIS, RECURRENCE NOT SPECIFIED: Primary | ICD-10-CM

## 2024-11-27 PROCEDURE — 99213 OFFICE O/P EST LOW 20 MIN: CPT | Performed by: EMERGENCY MEDICINE

## 2024-11-27 NOTE — PROGRESS NOTES
[facility]        NAME: Fransisca Angela is a 24 y.o. female  : 2000    MRN: [id number]  DATE: 2024  TIME: 11:03 AM    Assessment and Plan   Acute maxillary sinusitis, recurrence not specified [J01.00]  1. Acute maxillary sinusitis, recurrence not specified  amoxicillin-clavulanate (AUGMENTIN) 875-125 mg per tablet            Patient Instructions     Start antibiotics as prescribed  Vitamin D3 2000 IU daily  Vitamin C 1000mg twice per day  Multivitamin daily  Fluids and rest  Over the counter cold medication as needed (EX: Mucinex, tylenol/motrin)  Follow up with PCP in 3-5 days.  Proceed to ER if symptoms worsen.    Eat yogurt with live and active cultures and/or take a probiotic at least 3 hours before or after antibiotic dose. Monitor stool for diarrhea and/or blood. If this occurs, contact primary care doctor ASAP.    If tests are performed, our office will contact you with results only if changes need to made to the care plan discussed with you at the visit. You can review your full results on [facility].    Chief Complaint     Chief Complaint   Patient presents with    Sinusitis     Onset 25 Patient states nasal pressure, burning when patient takes a breath, coughing.          History of Present Illness       Patient is a 23 yo female with no significant PMH presenting in the clinic today for cold sx x 1 week. Admits sore throat, congestion, ear discomfort, cough, PND, and sinus pain/pressure. Denies fever, chills body aches, wheezing, headache, chest pain, and SOB. Admits the use of DayQuil, Mucinex, and hot tea for sx management. Denies recent sick contacts.         Review of Systems   Review of Systems   Constitutional:  Negative for chills, fatigue and fever.   HENT:  Positive for congestion, ear pain, postnasal drip, rhinorrhea, sinus pressure, sinus pain and sore throat.    Respiratory:  Positive for cough. Negative for shortness of breath.    Cardiovascular:   "Negative for chest pain.   Gastrointestinal:  Negative for abdominal pain, diarrhea, nausea and vomiting.   Musculoskeletal:  Negative for myalgias.   Skin:  Negative for rash.   Neurological:  Negative for dizziness and headaches.         Current Medications       Current Outpatient Medications:     amoxicillin-clavulanate (AUGMENTIN) 875-125 mg per tablet, Take 1 tablet by mouth every 12 (twelve) hours for 7 days, Disp: 14 tablet, Rfl: 0    Benzoyl Peroxide 10 % LIQD, , Disp: , Rfl:     pantoprazole (PROTONIX) 40 mg tablet, Take 1 tablet (40 mg total) by mouth daily before breakfast, Disp: 30 tablet, Rfl: 5    Current Allergies     Allergies as of 11/27/2024 - Reviewed 11/27/2024   Allergen Reaction Noted    Clindamycin Hives 10/18/2024    Famotidine Rash 04/26/2023            The following portions of the patient's history were reviewed and updated as appropriate: allergies, current medications, past family history, past medical history, past social history, past surgical history and problem list.     Past Medical History:   Diagnosis Date    GERD (gastroesophageal reflux disease)        Past Surgical History:   Procedure Laterality Date    FL BREAST REDUCTION Bilateral 04/10/2024    Procedure: BILATERAL BREAST REDUCTION;  Surgeon: Donnie Amezquita MD;  Location:  MAIN OR;  Service: Plastics    WISDOM TOOTH EXTRACTION         Family History   Problem Relation Age of Onset    Coronary artery disease Mother     COPD Mother     Diabetes Father     Crohn's disease Sister     Breast cancer Neg Hx     Colon cancer Neg Hx     Ovarian cancer Neg Hx     Cancer Neg Hx     Anesthesia problems Neg Hx          Medications have been verified.        Objective   /62   Pulse 77   Temp (!) 97.1 °F (36.2 °C)   Resp 20   Ht 5' 8\" (1.727 m)   Wt 124 kg (274 lb)   LMP 11/07/2024 (Exact Date)   SpO2 99%   BMI 41.66 kg/m²        Physical Exam     Physical Exam  Vitals reviewed.   Constitutional:       General: She is not " in acute distress.     Appearance: Normal appearance. She is normal weight. She is not ill-appearing.   HENT:      Head: Normocephalic.      Right Ear: Hearing, tympanic membrane, ear canal and external ear normal. No middle ear effusion. There is no impacted cerumen. Tympanic membrane is not erythematous or bulging.      Left Ear: Hearing, tympanic membrane, ear canal and external ear normal. No tenderness. There is no impacted cerumen. Tympanic membrane is not erythematous or bulging.      Nose: Congestion present. No rhinorrhea.      Right Sinus: Maxillary sinus tenderness present. No frontal sinus tenderness.      Left Sinus: Maxillary sinus tenderness present. No frontal sinus tenderness.      Mouth/Throat:      Lips: Pink.      Mouth: Mucous membranes are moist.      Pharynx: Oropharynx is clear. Uvula midline. Posterior oropharyngeal erythema present. No pharyngeal swelling, oropharyngeal exudate or uvula swelling.      Tonsils: No tonsillar exudate or tonsillar abscesses. 1+ on the right. 1+ on the left.   Eyes:      General:         Right eye: No discharge.         Left eye: No discharge.      Conjunctiva/sclera: Conjunctivae normal.   Cardiovascular:      Rate and Rhythm: Normal rate and regular rhythm.      Pulses: Normal pulses.      Heart sounds: Normal heart sounds. No murmur heard.     No friction rub. No gallop.   Pulmonary:      Effort: Pulmonary effort is normal. No respiratory distress.      Breath sounds: Normal breath sounds. No stridor. No wheezing, rhonchi or rales.   Musculoskeletal:      Cervical back: Normal range of motion and neck supple. No tenderness.   Lymphadenopathy:      Cervical: No cervical adenopathy.   Skin:     General: Skin is warm.      Findings: No rash.   Neurological:      Mental Status: She is alert.   Psychiatric:         Mood and Affect: Mood normal.         Behavior: Behavior normal.

## 2024-11-27 NOTE — LETTER
November 27, 2024     Patient: Fransisca Angela   YOB: 2000   Date of Visit: 11/27/2024       To Whom it May Concern:    Fransisca Angela was seen in my clinic on 11/27/2024. She may return to work on 11/28/2024 .    If you have any questions or concerns, please don't hesitate to call.         Sincerely,          Mariaa Schumacher PA-C        CC: No Recipients

## 2024-12-04 ENCOUNTER — PROCEDURE VISIT (OUTPATIENT)
Age: 24
End: 2024-12-04
Payer: COMMERCIAL

## 2024-12-04 VITALS
SYSTOLIC BLOOD PRESSURE: 126 MMHG | BODY MASS INDEX: 41.52 KG/M2 | DIASTOLIC BLOOD PRESSURE: 62 MMHG | WEIGHT: 274 LBS | HEIGHT: 68 IN

## 2024-12-04 DIAGNOSIS — M54.50 CHRONIC BILATERAL LOW BACK PAIN WITHOUT SCIATICA: ICD-10-CM

## 2024-12-04 DIAGNOSIS — M54.6 CHRONIC BILATERAL THORACIC BACK PAIN: Primary | ICD-10-CM

## 2024-12-04 DIAGNOSIS — G89.29 CHRONIC BILATERAL THORACIC BACK PAIN: Primary | ICD-10-CM

## 2024-12-04 DIAGNOSIS — M79.18 MYOFASCIAL PAIN: ICD-10-CM

## 2024-12-04 DIAGNOSIS — G89.29 CHRONIC BILATERAL LOW BACK PAIN WITHOUT SCIATICA: ICD-10-CM

## 2024-12-04 PROCEDURE — 98941 CHIROPRACT MANJ 3-4 REGIONS: CPT | Performed by: CHIROPRACTOR

## 2024-12-04 NOTE — PROGRESS NOTES
Assessment:     1. Chronic bilateral thoracic back pain        2. Chronic bilateral low back pain without sciatica        3. Myofascial pain            Condition in mildly flared. .     PLAN/TREATMENT:    Return for treatment later this week.   Continue using ice as needed for post treatment soreness.   She deferred on seeing pain management/PMR for consultation but will think about it and let me know if she wants to try it.     Treatment:  Myofascial release was performed to the thoracic paraspinals, middle trapezius, rhomboids bilaterally.  Myofascial release was performed to the lumbar paraspinals, left quadratus lumborum, gluteus medius bilaterally.  GT-4 was used to thoracic and thoracolumbar paraspinals.     Manipulation was performed to the left sacroiliac joint, L5-S1 utilizing Stewart drop technique and gentle side-lying flexion distraction.  This is performed of the thoracic spine at  T4-5, T8-T9, T9-10 utilizing a posterior to anterior mobilization and activator technique.  No HVLA was performed.    Subjective:  Fransisca is here today with ongoing complaints of LBP and mid back pain. She feels worse the past few days.  Having increased pain in the thoracolumbar region.  No significant leg symptoms lately.   Was unable to make it in for an appointment last week.      Objective:  Moderate spasm and tenderness is noted in the thoracolumbar paraspinal musculature and lower thoracic paraspinal musculature.  This corresponds increased kyphosis in the area.  Mild/moderate hypertonicity and tenderness is noted in the middle lower trapezius as well as rhomboids.  Mild/moderate hypertonicity is noted in the lumbar paraspinals quadratus lumborum bilaterally.     Joint dysfunction is present at T4-5, T8-T9, T10-11, L5-S1, right sacroiliac joint.  Pelvic obliquity is noted with anterior rotation of the right innominate and posterior rotation of the left.     .

## 2024-12-06 ENCOUNTER — PROCEDURE VISIT (OUTPATIENT)
Age: 24
End: 2024-12-06
Payer: COMMERCIAL

## 2024-12-06 VITALS — WEIGHT: 274 LBS | HEIGHT: 68 IN | BODY MASS INDEX: 41.52 KG/M2

## 2024-12-06 DIAGNOSIS — M54.6 CHRONIC BILATERAL THORACIC BACK PAIN: Primary | ICD-10-CM

## 2024-12-06 DIAGNOSIS — M54.50 CHRONIC BILATERAL LOW BACK PAIN WITHOUT SCIATICA: ICD-10-CM

## 2024-12-06 DIAGNOSIS — G89.29 CHRONIC BILATERAL LOW BACK PAIN WITHOUT SCIATICA: ICD-10-CM

## 2024-12-06 DIAGNOSIS — M79.18 MYOFASCIAL PAIN: ICD-10-CM

## 2024-12-06 DIAGNOSIS — G89.29 CHRONIC BILATERAL THORACIC BACK PAIN: Primary | ICD-10-CM

## 2024-12-06 PROCEDURE — 98941 CHIROPRACT MANJ 3-4 REGIONS: CPT | Performed by: CHIROPRACTOR

## 2024-12-06 NOTE — PROGRESS NOTES
Assessment:     1. Chronic bilateral thoracic back pain        2. Chronic bilateral low back pain without sciatica  MRI lumbar spine wo contrast      3. Myofascial pain            Condition in mildly flared. .     PLAN/TREATMENT:    Return for treatment once next week.   Continue using ice as needed for post treatment soreness.     Treatment:  Myofascial release was performed to the thoracic paraspinals, middle trapezius, rhomboids bilaterally.  Myofascial release was performed to the lumbar paraspinals, left quadratus lumborum, gluteus medius bilaterally.  GT-4 was used to thoracic and thoracolumbar paraspinals.     Manipulation was performed to the left sacroiliac joint, L5-S1 utilizing Stewart drop technique and gentle side-lying flexion distraction.  This is performed of the thoracic spine at  T4-5, T8-T9, T9-10 utilizing a posterior to anterior mobilization and activator technique.  No HVLA was performed.    Subjective:  Fransisca is here today with ongoing complaints of LBP and mid back pain. Feels a little better than two days ago when she was experiencing a flare-up.     Objective:  Moderate spasm and tenderness is noted in the thoracolumbar paraspinal musculature and lower thoracic paraspinal musculature.  This corresponds increased kyphosis in the area.  Mild/moderate hypertonicity and tenderness is noted in the middle lower trapezius as well as rhomboids.  Mild/moderate hypertonicity is noted in the lumbar paraspinals quadratus lumborum bilaterally.     Joint dysfunction is present at T4-5, T8-T9, T10-11, L5-S1, right sacroiliac joint.  Pelvic obliquity is noted with anterior rotation of the right innominate and posterior rotation of the left.     .

## 2024-12-16 ENCOUNTER — HOSPITAL ENCOUNTER (OUTPATIENT)
Dept: MRI IMAGING | Facility: HOSPITAL | Age: 24
Discharge: HOME | End: 2024-12-16
Payer: COMMERCIAL

## 2024-12-16 ENCOUNTER — PROCEDURE VISIT (OUTPATIENT)
Age: 24
End: 2024-12-16
Payer: COMMERCIAL

## 2024-12-16 VITALS — HEIGHT: 68 IN | BODY MASS INDEX: 41.52 KG/M2 | WEIGHT: 274 LBS

## 2024-12-16 DIAGNOSIS — M54.50 CHRONIC BILATERAL LOW BACK PAIN WITHOUT SCIATICA: ICD-10-CM

## 2024-12-16 DIAGNOSIS — M54.6 CHRONIC BILATERAL THORACIC BACK PAIN: Primary | ICD-10-CM

## 2024-12-16 DIAGNOSIS — M79.18 MYOFASCIAL PAIN: ICD-10-CM

## 2024-12-16 DIAGNOSIS — G89.29 CHRONIC BILATERAL LOW BACK PAIN WITHOUT SCIATICA: ICD-10-CM

## 2024-12-16 DIAGNOSIS — G89.29 CHRONIC BILATERAL THORACIC BACK PAIN: Primary | ICD-10-CM

## 2024-12-16 PROCEDURE — 72148 MRI LUMBAR SPINE W/O DYE: CPT

## 2024-12-16 PROCEDURE — 98941 CHIROPRACT MANJ 3-4 REGIONS: CPT | Performed by: CHIROPRACTOR

## 2024-12-16 NOTE — PROGRESS NOTES
Assessment:     1. Chronic bilateral thoracic back pain        2. Chronic bilateral low back pain without sciatica        3. Myofascial pain            Condition in mildly improved.     PLAN/TREATMENT:    Return for treatment once next week.   Continue using ice as needed for post treatment soreness.     Treatment:  Myofascial release was performed to the thoracic paraspinals, middle trapezius, rhomboids bilaterally.  Myofascial release was performed to the lumbar paraspinals, left quadratus lumborum, gluteus medius bilaterally.  GT-4 was used to thoracic and thoracolumbar paraspinals.     Manipulation was performed to the left sacroiliac joint, L5-S1 utilizing Stewart drop technique and gentle side-lying flexion distraction.  This is performed of the thoracic spine at  T4-5, T8-T9, T9-10 utilizing a posterior to anterior mobilization and activator technique.  No HVLA was performed.    Subjective:  Fransisca is here today with ongoing complaints of LBP and mid back pain. Felt increased back pain( thoracolumbar region down to lower lumbar region last week.  A bit better today than last week.  She is due to have the lumbar MRI done later today.      Objective:  Moderate spasm and tenderness is noted in the thoracolumbar paraspinal musculature and lower thoracic paraspinal musculature.  This corresponds increased kyphosis in the area.  Mild/moderate hypertonicity and tenderness is noted in the middle lower trapezius as well as rhomboids.  Mild/moderate hypertonicity is noted in the lumbar paraspinals quadratus lumborum bilaterally.     Joint dysfunction is present at T4-5, T8-T9, T10-11, L5-S1, right sacroiliac joint.  Pelvic obliquity is noted with anterior rotation of the right innominate and posterior rotation of the left.     .

## 2024-12-18 ENCOUNTER — RESULTS FOLLOW-UP (OUTPATIENT)
Age: 24
End: 2024-12-18

## 2024-12-23 ENCOUNTER — PATIENT OUTREACH (OUTPATIENT)
Dept: OBGYN CLINIC | Facility: CLINIC | Age: 24
End: 2024-12-23

## 2024-12-23 NOTE — PROGRESS NOTES
JOSE ANGEL FORBES contacted pt to f/u.     Pt reports that she has continued meeting with there therapist from [facility] weekly on Sundays and states that it is going well and she is happy with the services. Pt reports that she does feel its helpful to have someone to speak to. Pt denied additional f/u from JOSE ANGEL FORBES. JOSE ANGEL FORBES encouraged pt to contact JOSE ANGEL FORBES for additional support if needed. JOSE ANGEL FORBES will otherwise close this referral at this time.

## 2025-04-15 ENCOUNTER — OFFICE VISIT (OUTPATIENT)
Dept: FAMILY MEDICINE CLINIC | Facility: CLINIC | Age: 25
End: 2025-04-15

## 2025-04-15 VITALS
SYSTOLIC BLOOD PRESSURE: 124 MMHG | HEIGHT: 68 IN | RESPIRATION RATE: 16 BRPM | OXYGEN SATURATION: 99 % | HEART RATE: 77 BPM | DIASTOLIC BLOOD PRESSURE: 82 MMHG | TEMPERATURE: 98 F | BODY MASS INDEX: 43.04 KG/M2 | WEIGHT: 284 LBS

## 2025-04-15 DIAGNOSIS — N62 MACROMASTIA: ICD-10-CM

## 2025-04-15 DIAGNOSIS — Z88.9 MULTIPLE ALLERGIES: ICD-10-CM

## 2025-04-15 DIAGNOSIS — Z11.3 ROUTINE SCREENING FOR STI (SEXUALLY TRANSMITTED INFECTION): ICD-10-CM

## 2025-04-15 DIAGNOSIS — Z11.4 SCREENING FOR HIV (HUMAN IMMUNODEFICIENCY VIRUS): ICD-10-CM

## 2025-04-15 DIAGNOSIS — E66.813 CLASS 3 SEVERE OBESITY DUE TO EXCESS CALORIES WITH BODY MASS INDEX (BMI) OF 40.0 TO 44.9 IN ADULT, UNSPECIFIED WHETHER SERIOUS COMORBIDITY PRESENT: Primary | ICD-10-CM

## 2025-04-15 DIAGNOSIS — Z11.59 NEED FOR HEPATITIS C SCREENING TEST: ICD-10-CM

## 2025-04-15 PROCEDURE — 99213 OFFICE O/P EST LOW 20 MIN: CPT | Performed by: NURSE PRACTITIONER

## 2025-04-15 NOTE — ASSESSMENT & PLAN NOTE
Wt Readings from Last 3 Encounters:   04/15/25 129 kg (284 lb)   12/16/24 124 kg (274 lb)   12/06/24 124 kg (274 lb)     -Encouraged diet and lifestyle changes: decrease processed foods (cakes, cookies, chips, soda), decrease total carbohydrate intake, decrease fried/fatty foods, increase fruits and vegetables, increase lean proteins (chicken, turkey), increase healthy fats (avocado, fish, nuts), drink plenty of water (at least four 16 oz bottles per day)  -discussed starting medication to assist with weight loss   -will check labs first, discussed need to be on birth control     Orders:    CBC and differential; Future    Comprehensive metabolic panel; Future    Hemoglobin A1C; Future    Lipid panel; Future    TSH, 3rd generation with Free T4 reflex; Future

## 2025-04-15 NOTE — PROGRESS NOTES
Name: Fransisca Angela      : 2000      MRN: [id number]  Encounter Provider: CYRUS Montiel  Encounter Date: 4/15/2025   Encounter department: [facility]  :  Assessment & Plan  Class 3 severe obesity due to excess calories with body mass index (BMI) of 40.0 to 44.9 in adult, unspecified whether serious comorbidity present (HCC)      Wt Readings from Last 3 Encounters:   04/15/25 129 kg (284 lb)   24 124 kg (274 lb)   24 124 kg (274 lb)     -Encouraged diet and lifestyle changes: decrease processed foods (cakes, cookies, chips, soda), decrease total carbohydrate intake, decrease fried/fatty foods, increase fruits and vegetables, increase lean proteins (chicken, turkey), increase healthy fats (avocado, fish, nuts), drink plenty of water (at least four 16 oz bottles per day)  -discussed starting medication to assist with weight loss   -will check labs first, discussed need to be on birth control     Orders:    CBC and differential; Future    Comprehensive metabolic panel; Future    Hemoglobin A1C; Future    Lipid panel; Future    TSH, 3rd generation with Free T4 reflex; Future    Routine screening for STI (sexually transmitted infection)    Orders:    RPR-Syphilis Screening (Total Syphilis IGG/IGM); Future    Chlamydia/GC amplified DNA by PCR; Future    Need for hepatitis C screening test    Orders:    Hepatitis C antibody; Future    Screening for HIV (human immunodeficiency virus)    Orders:    HIV 1/2 AG/AB w Reflex [facility] for 2 yr old and above; Future        BMI Counseling: Body mass index is 43.18 kg/m². The BMI is above normal. Nutrition recommendations include encouraging healthy choices of fruits and vegetables, decreasing fast food intake, consuming healthier snacks and limiting drinks that contain sugar. Exercise recommendations include exercising 3-5 times per week. Rationale for BMI follow-up plan is due to patient being overweight or obese.  "      History of Present Illness   22 YO female with PMH of chronic right knee pain, chronic mid and low back pain presents today for follow up. Reports that she has been dieting and exercising without any results. She actually continues to gain weight which has impacted her in many aspects of life.     The following portions of the patient's history were reviewed and updated as appropriate: allergies, current medications, past family history, past medical history, past social history, past surgical history and problem list.        Review of Systems   Constitutional:  Positive for unexpected weight change. Negative for chills and fever.   HENT:  Negative for ear pain and sore throat.    Eyes:  Negative for pain and visual disturbance.   Respiratory:  Negative for cough and shortness of breath.    Cardiovascular:  Negative for chest pain and palpitations.   Gastrointestinal:  Negative for abdominal pain and vomiting.   Genitourinary:  Negative for dysuria and hematuria.   Musculoskeletal:  Negative for arthralgias and back pain.   Skin:  Negative for color change and rash.   Neurological:  Negative for seizures and syncope.   All other systems reviewed and are negative.      Objective   /82 (BP Location: Right arm, Patient Position: Sitting, Cuff Size: Large)   Pulse 77   Temp 98 °F (36.7 °C) (Temporal)   Resp 16   Ht 5' 8\" (1.727 m)   Wt 129 kg (284 lb)   LMP  (LMP Unknown)   SpO2 99%   BMI 43.18 kg/m²      Physical Exam  Vitals and nursing note reviewed.   Constitutional:       General: She is not in acute distress.     Appearance: She is well-developed. She is obese.   HENT:      Head: Normocephalic and atraumatic.      Right Ear: External ear normal.      Left Ear: External ear normal.   Eyes:      Conjunctiva/sclera: Conjunctivae normal.   Cardiovascular:      Rate and Rhythm: Normal rate and regular rhythm.   Pulmonary:      Effort: Pulmonary effort is normal. No respiratory distress.      Breath " sounds: Normal breath sounds.   Abdominal:      Palpations: Abdomen is soft.      Tenderness: There is no abdominal tenderness.   Musculoskeletal:         General: No swelling.      Cervical back: Neck supple.   Skin:     General: Skin is warm and dry.      Capillary Refill: Capillary refill takes less than 2 seconds.   Neurological:      Mental Status: She is alert and oriented to person, place, and time.   Psychiatric:         Mood and Affect: Mood normal.

## 2025-04-16 ENCOUNTER — RA CDI HCC (OUTPATIENT)
Dept: OTHER | Facility: HOSPITAL | Age: 25
End: 2025-04-16

## 2025-04-19 ENCOUNTER — APPOINTMENT (OUTPATIENT)
Dept: LAB | Facility: HOSPITAL | Age: 25
End: 2025-04-19
Payer: COMMERCIAL

## 2025-04-19 DIAGNOSIS — Z11.59 NEED FOR HEPATITIS C SCREENING TEST: ICD-10-CM

## 2025-04-19 DIAGNOSIS — E66.813 CLASS 3 SEVERE OBESITY DUE TO EXCESS CALORIES WITH BODY MASS INDEX (BMI) OF 40.0 TO 44.9 IN ADULT, UNSPECIFIED WHETHER SERIOUS COMORBIDITY PRESENT: ICD-10-CM

## 2025-04-19 DIAGNOSIS — Z11.3 ROUTINE SCREENING FOR STI (SEXUALLY TRANSMITTED INFECTION): ICD-10-CM

## 2025-04-19 DIAGNOSIS — Z11.4 SCREENING FOR HIV (HUMAN IMMUNODEFICIENCY VIRUS): ICD-10-CM

## 2025-04-19 LAB
ALBUMIN SERPL BCG-MCNC: 3.9 G/DL (ref 3.5–5)
ALP SERPL-CCNC: 77 U/L (ref 34–104)
ALT SERPL W P-5'-P-CCNC: 8 U/L (ref 7–52)
ANION GAP SERPL CALCULATED.3IONS-SCNC: 7 MMOL/L (ref 4–13)
AST SERPL W P-5'-P-CCNC: 12 U/L (ref 13–39)
BASOPHILS # BLD AUTO: 0.03 THOUSANDS/ÂΜL (ref 0–0.1)
BASOPHILS NFR BLD AUTO: 1 % (ref 0–1)
BILIRUB SERPL-MCNC: 0.44 MG/DL (ref 0.2–1)
BUN SERPL-MCNC: 8 MG/DL (ref 5–25)
CALCIUM SERPL-MCNC: 8.8 MG/DL (ref 8.4–10.2)
CHLORIDE SERPL-SCNC: 104 MMOL/L (ref 96–108)
CHOLEST SERPL-MCNC: 151 MG/DL (ref ?–200)
CO2 SERPL-SCNC: 26 MMOL/L (ref 21–32)
CREAT SERPL-MCNC: 0.69 MG/DL (ref 0.6–1.3)
EOSINOPHIL # BLD AUTO: 0.14 THOUSAND/ÂΜL (ref 0–0.61)
EOSINOPHIL NFR BLD AUTO: 2 % (ref 0–6)
ERYTHROCYTE [DISTWIDTH] IN BLOOD BY AUTOMATED COUNT: 13.6 % (ref 11.6–15.1)
EST. AVERAGE GLUCOSE BLD GHB EST-MCNC: 114 MG/DL
GFR SERPL CREATININE-BSD FRML MDRD: 122 ML/MIN/1.73SQ M
GLUCOSE P FAST SERPL-MCNC: 84 MG/DL (ref 65–99)
HBA1C MFR BLD: 5.6 %
HCT VFR BLD AUTO: 40 % (ref 34.8–46.1)
HDLC SERPL-MCNC: 55 MG/DL
HGB BLD-MCNC: 12.1 G/DL (ref 11.5–15.4)
IMM GRANULOCYTES # BLD AUTO: 0.02 THOUSAND/UL (ref 0–0.2)
IMM GRANULOCYTES NFR BLD AUTO: 0 % (ref 0–2)
LDLC SERPL CALC-MCNC: 85 MG/DL (ref 0–100)
LYMPHOCYTES # BLD AUTO: 2.32 THOUSANDS/ÂΜL (ref 0.6–4.47)
LYMPHOCYTES NFR BLD AUTO: 39 % (ref 14–44)
MCH RBC QN AUTO: 25.3 PG (ref 26.8–34.3)
MCHC RBC AUTO-ENTMCNC: 30.3 G/DL (ref 31.4–37.4)
MCV RBC AUTO: 84 FL (ref 82–98)
MONOCYTES # BLD AUTO: 0.44 THOUSAND/ÂΜL (ref 0.17–1.22)
MONOCYTES NFR BLD AUTO: 7 % (ref 4–12)
NEUTROPHILS # BLD AUTO: 2.98 THOUSANDS/ÂΜL (ref 1.85–7.62)
NEUTS SEG NFR BLD AUTO: 51 % (ref 43–75)
NONHDLC SERPL-MCNC: 96 MG/DL
NRBC BLD AUTO-RTO: 0 /100 WBCS
PLATELET # BLD AUTO: 286 THOUSANDS/UL (ref 149–390)
PMV BLD AUTO: 9.4 FL (ref 8.9–12.7)
POTASSIUM SERPL-SCNC: 3.7 MMOL/L (ref 3.5–5.3)
PROT SERPL-MCNC: 7.2 G/DL (ref 6.4–8.4)
RBC # BLD AUTO: 4.78 MILLION/UL (ref 3.81–5.12)
SODIUM SERPL-SCNC: 137 MMOL/L (ref 135–147)
TRIGL SERPL-MCNC: 54 MG/DL (ref ?–150)
TSH SERPL DL<=0.05 MIU/L-ACNC: 0.73 UIU/ML (ref 0.45–4.5)
WBC # BLD AUTO: 5.93 THOUSAND/UL (ref 4.31–10.16)

## 2025-04-19 PROCEDURE — 80061 LIPID PANEL: CPT

## 2025-04-19 PROCEDURE — 80053 COMPREHEN METABOLIC PANEL: CPT

## 2025-04-19 PROCEDURE — 86803 HEPATITIS C AB TEST: CPT

## 2025-04-19 PROCEDURE — 84443 ASSAY THYROID STIM HORMONE: CPT

## 2025-04-19 PROCEDURE — 87389 HIV-1 AG W/HIV-1&-2 AB AG IA: CPT

## 2025-04-19 PROCEDURE — 85025 COMPLETE CBC W/AUTO DIFF WBC: CPT

## 2025-04-19 PROCEDURE — 86780 TREPONEMA PALLIDUM: CPT

## 2025-04-19 PROCEDURE — 36415 COLL VENOUS BLD VENIPUNCTURE: CPT

## 2025-04-19 PROCEDURE — 83036 HEMOGLOBIN GLYCOSYLATED A1C: CPT

## 2025-04-20 LAB
HCV AB SER QL: NORMAL
HIV 1+2 AB+HIV1 P24 AG SERPL QL IA: NORMAL
TREPONEMA PALLIDUM IGG+IGM AB [PRESENCE] IN SERUM OR PLASMA BY IMMUNOASSAY: NORMAL

## 2025-04-21 ENCOUNTER — APPOINTMENT (OUTPATIENT)
Dept: LAB | Facility: HOSPITAL | Age: 25
End: 2025-04-21
Payer: COMMERCIAL

## 2025-04-21 ENCOUNTER — OFFICE VISIT (OUTPATIENT)
Dept: FAMILY MEDICINE CLINIC | Facility: CLINIC | Age: 25
End: 2025-04-21

## 2025-04-21 VITALS
HEART RATE: 64 BPM | HEIGHT: 68 IN | OXYGEN SATURATION: 100 % | SYSTOLIC BLOOD PRESSURE: 118 MMHG | RESPIRATION RATE: 16 BRPM | BODY MASS INDEX: 43.1 KG/M2 | TEMPERATURE: 98 F | DIASTOLIC BLOOD PRESSURE: 80 MMHG | WEIGHT: 284.4 LBS

## 2025-04-21 DIAGNOSIS — E66.813 CLASS 3 SEVERE OBESITY DUE TO EXCESS CALORIES WITH BODY MASS INDEX (BMI) OF 40.0 TO 44.9 IN ADULT: Primary | ICD-10-CM

## 2025-04-21 DIAGNOSIS — Z30.011 ENCOUNTER FOR INITIAL PRESCRIPTION OF CONTRACEPTIVE PILLS: ICD-10-CM

## 2025-04-21 LAB — SL AMB POCT URINE HCG: NEGATIVE

## 2025-04-21 PROCEDURE — 87591 N.GONORRHOEAE DNA AMP PROB: CPT

## 2025-04-21 PROCEDURE — 81025 URINE PREGNANCY TEST: CPT | Performed by: NURSE PRACTITIONER

## 2025-04-21 PROCEDURE — 99213 OFFICE O/P EST LOW 20 MIN: CPT | Performed by: NURSE PRACTITIONER

## 2025-04-21 PROCEDURE — 87491 CHLMYD TRACH DNA AMP PROBE: CPT

## 2025-04-21 RX ORDER — NORGESTIMATE AND ETHINYL ESTRADIOL 7DAYSX3 28
1 KIT ORAL DAILY
Qty: 28 TABLET | Refills: 11 | Status: SHIPPED | OUTPATIENT
Start: 2025-04-21

## 2025-04-21 NOTE — ASSESSMENT & PLAN NOTE
Prior Authorization Clinical Questions for Weight Management Pharmacotherapy    1. Does the patient have a contrainidcation to medication prescribed for weight management?: No  2. Does the patient have a diagnosis of obesity, confirmed by a BMI greater than or equal to 30 kg/m^2?: Yes  3. Does the patient have a BMI of greater than or equal to 27 kg/m^2 with at least one weight-related comorbidity/risk factor/complication (e.g. diabetes, dyslipidemia, coronary artery disease)?: No  7. Has the patient been on a weight loss regimen of low-calorie diet, increased physical activity, and lifestyle modifications for a minimum of 6 months?: Yes  8. Has the patient completed a comprehensive weight loss program (ie, Weight Watchers, Noom, Bariatrics, other jason on phone)? If so, what?: No  9. Does the patient have a history of type 2 diabetes?: No  10. Has the member tried and failed other weight loss medication within the past 12 months?: No  11. Will the member use requested medication in combination with another GLP agonist or weight loss drug?: No  12. Is the medication a controlled substance?: No     Baseline weight (in pounds): 284 lbs       Patient has failed conservative measures alone   Would benefit from addition of semaglutide, discussed expected outcomes and reviewed possible side effects of medication  POC HCG negative in office today   Contraception Choice: OCP, will initiate today   RTC in 4 weeks for follow up/ dose increase as appropriate

## 2025-04-21 NOTE — PROGRESS NOTES
Name: Fransisca Angela      : 2000      MRN: [id number]  Encounter Provider: CYRUS Montiel  Encounter Date: 2025   Encounter department: [facility]  :  Assessment & Plan  Class 3 severe obesity due to excess calories with body mass index (BMI) of 40.0 to 44.9 in adult  Prior Authorization Clinical Questions for Weight Management Pharmacotherapy    1. Does the patient have a contrainidcation to medication prescribed for weight management?: No  2. Does the patient have a diagnosis of obesity, confirmed by a BMI greater than or equal to 30 kg/m^2?: Yes  3. Does the patient have a BMI of greater than or equal to 27 kg/m^2 with at least one weight-related comorbidity/risk factor/complication (e.g. diabetes, dyslipidemia, coronary artery disease)?: No  7. Has the patient been on a weight loss regimen of low-calorie diet, increased physical activity, and lifestyle modifications for a minimum of 6 months?: Yes  8. Has the patient completed a comprehensive weight loss program (ie, Weight Watchers, Noom, Bariatrics, other jason on phone)? If so, what?: No  9. Does the patient have a history of type 2 diabetes?: No  10. Has the member tried and failed other weight loss medication within the past 12 months?: No  11. Will the member use requested medication in combination with another GLP agonist or weight loss drug?: No  12. Is the medication a controlled substance?: No     Baseline weight (in pounds): 284 lbs       Patient has failed conservative measures alone   Would benefit from addition of semaglutide, discussed expected outcomes and reviewed possible side effects of medication  POC HCG negative in office today   Contraception Choice: OCP, will initiate today   RTC in 4 weeks for follow up/ dose increase as appropriate                 History of Present Illness   26 YO female with PMH of chronic right knee pain, chronic mid and low back pain presents today for  follow up and to review labs. She is interested in starting medication for weight loss.     The following portions of the patient's history were reviewed and updated as appropriate: allergies, current medications, past family history, past medical history, past social history, past surgical history and problem list.        Review of Systems   Constitutional:  Negative for chills and fever.   HENT:  Negative for ear pain and sore throat.    Eyes:  Negative for pain and visual disturbance.   Respiratory:  Negative for cough and shortness of breath.    Cardiovascular:  Negative for chest pain and palpitations.   Gastrointestinal:  Negative for abdominal pain and vomiting.   Genitourinary:  Negative for dysuria and hematuria.   Musculoskeletal:  Negative for arthralgias and back pain.   Skin:  Negative for color change and rash.   Neurological:  Negative for seizures and syncope.   All other systems reviewed and are negative.      Objective   LMP  (LMP Unknown)      Physical Exam  Vitals and nursing note reviewed.   Constitutional:       General: She is not in acute distress.     Appearance: She is well-developed.   HENT:      Head: Normocephalic and atraumatic.   Eyes:      Conjunctiva/sclera: Conjunctivae normal.   Cardiovascular:      Rate and Rhythm: Normal rate and regular rhythm.      Heart sounds: No murmur heard.  Pulmonary:      Effort: Pulmonary effort is normal. No respiratory distress.      Breath sounds: Normal breath sounds.   Abdominal:      Palpations: Abdomen is soft.      Tenderness: There is no abdominal tenderness.   Musculoskeletal:         General: No swelling.      Cervical back: Neck supple.   Skin:     General: Skin is warm and dry.      Capillary Refill: Capillary refill takes less than 2 seconds.   Neurological:      Mental Status: She is alert.   Psychiatric:         Mood and Affect: Mood normal.

## 2025-04-22 LAB
C TRACH DNA SPEC QL NAA+PROBE: NEGATIVE
N GONORRHOEA DNA SPEC QL NAA+PROBE: NEGATIVE

## 2025-04-24 DIAGNOSIS — E66.813 CLASS 3 SEVERE OBESITY DUE TO EXCESS CALORIES WITH BODY MASS INDEX (BMI) OF 40.0 TO 44.9 IN ADULT: Primary | ICD-10-CM

## 2025-04-24 RX ORDER — TIRZEPATIDE 5 MG/.5ML
5 INJECTION, SOLUTION SUBCUTANEOUS WEEKLY
Qty: 2 ML | Refills: 0 | Status: SHIPPED | OUTPATIENT
Start: 2025-05-22 | End: 2025-06-19

## 2025-04-24 RX ORDER — TIRZEPATIDE 12.5 MG/.5ML
12.5 INJECTION, SOLUTION SUBCUTANEOUS WEEKLY
Qty: 2 ML | Refills: 0 | Status: SHIPPED | OUTPATIENT
Start: 2025-08-14 | End: 2025-09-11

## 2025-04-24 RX ORDER — TIRZEPATIDE 2.5 MG/.5ML
2.5 INJECTION, SOLUTION SUBCUTANEOUS WEEKLY
Qty: 2 ML | Refills: 0 | Status: SHIPPED | OUTPATIENT
Start: 2025-04-24

## 2025-04-24 RX ORDER — TIRZEPATIDE 10 MG/.5ML
10 INJECTION, SOLUTION SUBCUTANEOUS WEEKLY
Qty: 2 ML | Refills: 0 | Status: SHIPPED | OUTPATIENT
Start: 2025-07-17 | End: 2025-08-14

## 2025-04-24 RX ORDER — TIRZEPATIDE 15 MG/.5ML
15 INJECTION, SOLUTION SUBCUTANEOUS WEEKLY
Qty: 6 ML | Refills: 0 | Status: SHIPPED | OUTPATIENT
Start: 2025-09-11

## 2025-04-24 RX ORDER — TIRZEPATIDE 7.5 MG/.5ML
7.5 INJECTION, SOLUTION SUBCUTANEOUS WEEKLY
Qty: 2 ML | Refills: 0 | Status: SHIPPED | OUTPATIENT
Start: 2025-06-19 | End: 2025-07-17

## 2025-05-28 ENCOUNTER — OFFICE VISIT (OUTPATIENT)
Dept: GASTROENTEROLOGY | Facility: MEDICAL CENTER | Age: 25
End: 2025-05-28
Payer: COMMERCIAL

## 2025-05-28 ENCOUNTER — TELEPHONE (OUTPATIENT)
Dept: GASTROENTEROLOGY | Facility: MEDICAL CENTER | Age: 25
End: 2025-05-28

## 2025-05-28 VITALS
DIASTOLIC BLOOD PRESSURE: 75 MMHG | BODY MASS INDEX: 42.59 KG/M2 | TEMPERATURE: 97.8 F | WEIGHT: 281 LBS | HEART RATE: 75 BPM | OXYGEN SATURATION: 99 % | HEIGHT: 68 IN | SYSTOLIC BLOOD PRESSURE: 107 MMHG

## 2025-05-28 DIAGNOSIS — R10.13 DYSPEPSIA: ICD-10-CM

## 2025-05-28 DIAGNOSIS — E66.813 CLASS 3 SEVERE OBESITY DUE TO EXCESS CALORIES WITH BODY MASS INDEX (BMI) OF 40.0 TO 44.9 IN ADULT: Primary | ICD-10-CM

## 2025-05-28 DIAGNOSIS — K59.01 SLOW TRANSIT CONSTIPATION: ICD-10-CM

## 2025-05-28 DIAGNOSIS — R13.10 DYSPHAGIA, UNSPECIFIED TYPE: ICD-10-CM

## 2025-05-28 PROCEDURE — 99214 OFFICE O/P EST MOD 30 MIN: CPT | Performed by: INTERNAL MEDICINE

## 2025-05-28 RX ORDER — LINACLOTIDE 145 UG/1
145 CAPSULE, GELATIN COATED ORAL DAILY
Qty: 30 CAPSULE | Refills: 2 | Status: SHIPPED | OUTPATIENT
Start: 2025-05-28 | End: 2025-06-27

## 2025-05-28 NOTE — ASSESSMENT & PLAN NOTE
Consider Linzess - she is agreeable. Will start 145mcg daily.   Orders:    linaCLOtide (Linzess) 145 MCG CAPS; Take 1 capsule (145 mcg total) by mouth daily

## 2025-05-28 NOTE — ASSESSMENT & PLAN NOTE
She has dysphagia and dyspepsia and burning.   Recommend manometry and pH study.however, due to nickel allergy will just do the 24 hr pH. If cannot tolerate then will do EGD with BRAVO.     Orders:    24hr pH testing - Nickel Free; Future

## 2025-05-28 NOTE — PROGRESS NOTES
Name: Fransisca Angela      : 2000      MRN: [id number]  Encounter Provider: David Pires MD  Encounter Date: 2025   Encounter department: [facility]  :  Assessment & Plan  Class 3 severe obesity due to excess calories with body mass index (BMI) of 40.0 to 44.9 in adult  Was recommended zepbound but has not taken it.   Will see a nutritionist and work on it. She has been working on it herself as well.          Slow transit constipation  Consider Linzess - she is agreeable. Will start 145mcg daily.   Orders:    linaCLOtide (Linzess) 145 MCG CAPS; Take 1 capsule (145 mcg total) by mouth daily    Dyspepsia  She has dysphagia and dyspepsia and burning.   Recommend manometry and pH study.however, due to nickel allergy will just do the 24 hr pH. If cannot tolerate then will do EGD with BRAVO.     Orders:    24hr pH testing - Nickel Free; Future    Dysphagia, unspecified type  Recommend barium swallow.   Discussed manometry but she has nickel allergy  Orders:    FL Barium Swallow Motility Study; Future        History of Present Illness   Fransisca Angela is a 25 y.o. female who presents for indigestion  HPI    She was on pantoprazole and omeprazole in the past but has not helped much. Now getting worse and having indigestion after eating. She has regurgitation. The TUMS may help.   She has had the symptoms for many years but worse since .   She has some dysphagia as well. She had seen speech and swallow and had a test done :   Pt presents with minimal oropharyngeal dysphagia characterized by piecemeal deglutition, premature spillage to the pyriforms, and delayed swallow initiation. Despite delayed swallow, no penetration or aspiration occurred across all consistencies and no significant pharyngeal retention was seen.   She has mild nocturnal symptoms of reflux but not recently.   She was recommended Zepbound but didn't take it.   She has constipation - every 2 - 3 days.  "  She has no bleeding in the stools.   She has not changed the weight much. She has not seen surgery and would like to hold off on that.     EGD in 2023 : reviewed images. Normal.. biopsies unremarkable.     She is more bloated and gassy.     She has had constipation and has taken over the counter stool softeners and those have helped.       Review of Systems   Constitutional: Negative.    HENT: Negative.     Eyes: Negative.    Respiratory: Negative.     Cardiovascular: Negative.    Gastrointestinal:         See HPI   Endocrine: Negative.    Genitourinary: Negative.    Musculoskeletal: Negative.    Skin: Negative.    Allergic/Immunologic: Negative.    Neurological: Negative.    Hematological: Negative.    Psychiatric/Behavioral: Negative.     All other systems reviewed and are negative.   A complete review of systems is negative other than that noted above in the HPI.      Current Medications[1]  Objective   /75 (BP Location: Left arm, Patient Position: Sitting, Cuff Size: Large)   Pulse 75   Temp 97.8 °F (36.6 °C) (Tympanic)   Ht 5' 8\" (1.727 m)   Wt 127 kg (281 lb)   SpO2 99%   BMI 42.73 kg/m²     Physical Exam  Constitutional:       Appearance: Normal appearance. She is well-developed. She is obese.   HENT:      Head: Normocephalic and atraumatic.     Eyes:      General: No scleral icterus.     Conjunctiva/sclera: Conjunctivae normal.      Pupils: Pupils are equal, round, and reactive to light.       Cardiovascular:      Rate and Rhythm: Normal rate and regular rhythm.      Heart sounds: Normal heart sounds.   Pulmonary:      Effort: Pulmonary effort is normal. No respiratory distress.      Breath sounds: Normal breath sounds.   Abdominal:      General: Bowel sounds are normal. There is no distension.      Palpations: Abdomen is soft. There is no mass.      Tenderness: There is no abdominal tenderness.      Hernia: No hernia is present.     Musculoskeletal:         General: Normal range of motion.     "  Cervical back: Normal range of motion.   Lymphadenopathy:      Cervical: No cervical adenopathy.     Skin:     General: Skin is warm.     Neurological:      Mental Status: She is alert and oriented to person, place, and time.     Psychiatric:         Behavior: Behavior normal.         Thought Content: Thought content normal.            Lab Results: I personally reviewed relevant lab results.       Results for orders placed during the hospital encounter of 07/31/23    EGD    Narrative  Table formatting from the original result was not included.  [facility]  [address]  [facility] [address]  [phone]      DATE OF SERVICE:  7/31/23    PHYSICIAN(S):  Attending:  Sage Gautam MD    Fellow:  No Staff Documented      INDICATION:  Other dysphagia, Gastroesophageal reflux disease without esophagitis    POST-OP DIAGNOSIS:  See the impression below.    PREPROCEDURE:  Informed consent was obtained for the procedure, including sedation.  Risks of perforation, hemorrhage, adverse drug reaction and aspiration were discussed. The patient was placed in the left lateral decubitus position.    Patient was explained about the risks and benefits of the procedure. Risks including but not limited to bleeding, infection, and perforation were explained in detail. Also explained about less than 100% sensitivity with the exam and other alternatives.    PROCEDURE: EGD    DETAILS OF PROCEDURE:  Patient was taken to the procedure room where a time out was performed to confirm correct patient and correct procedure. The patient underwent monitored anesthesia care, which was administered by an anesthesia professional. The patient's blood pressure, heart rate, level of consciousness, respirations and oxygen were monitored throughout the procedure. The scope was advanced to the second part of the duodenum. Retroflexion was performed in the fundus. The patient experienced no blood loss. The procedure was  not difficult. The patient tolerated the procedure well. There were no apparent adverse events.    ANESTHESIA INFORMATION:  ASA: II  Anesthesia Type: IV Sedation with Anesthesia    MEDICATIONS:  sodium chloride 0.9 % infusion 500 mL*  *From user-documented volume  (Totals for administrations occurring from 1038 to 1049 on 07/31/23)      FINDINGS:  Irregular Z-line 37 cm from the incisors  Mild, patchy abnormal mucosa with linear furrows in the middle third of the esophagus and lower third of the esophagus; performed cold forceps biopsy to rule out eosinophilic esophagitis  Mild, localized erythematous mucosa in the antrum; performed cold forceps biopsy to rule out H. pylori  The duodenum appeared normal.      SPECIMENS:  ID Type Source Tests Collected by Time Destination  1 : gastric bx r/o h. pylori Tissue Stomach TISSUE EXAM Sage Gautam MD 7/31/2023 10:44 AM  2 : lower esophagus bx r/o EOE Tissue Esophagus TISSUE EXAM Sage Gautam MD 7/31/2023 10:47 AM  3 : upper esophageal bx r/o EOE Tissue Esophagus TISSUE EXAM Sage Gautam MD 7/31/2023 10:48 AM          Impression  Irregular Z-line  Mild abnormal mucosa; performed cold forceps biopsies  The duodenum appeared normal.      RECOMMENDATION:  Await pathology results  Follow-up in GI clinic.  Start omeprazole (Prilosec) 40 mg daily.  Take 30 min before breakfast.              Sage Gautam MD               [1]   Current Outpatient Medications   Medication Sig Dispense Refill    Benzoyl Peroxide 10 % LIQD  (Patient not taking: Reported on 5/28/2025)      norgestimate-ethinyl estradiol (Tri-Sprintec) 0.18/0.215/0.25 MG-35 MCG per tablet Take 1 tablet by mouth daily (Patient not taking: Reported on 5/28/2025) 28 tablet 11    pantoprazole (PROTONIX) 40 mg tablet Take 1 tablet (40 mg total) by mouth daily before breakfast (Patient not taking: Reported on 5/28/2025) 30 tablet 5    [START ON 7/17/2025] Zepbound 10 MG/0.5ML auto-injector Inject 0.5 mL (10 mg  total) under the skin once a week for 28 days Do not start before July 17, 2025. 2 mL 0    [START ON 8/14/2025] Zepbound 12.5 MG/0.5ML auto-injector Inject 0.5 mL (12.5 mg total) under the skin once a week for 28 days Do not start before August 14, 2025. 2 mL 0    [START ON 9/11/2025] Zepbound 15 MG/0.5ML auto-injector Inject 0.5 mL (15 mg total) under the skin once a week Do not start before September 11, 2025. 6 mL 0    Zepbound 2.5 MG/0.5ML auto-injector Inject 0.5 mL (2.5 mg total) under the skin once a week 2 mL 0    Zepbound 5 MG/0.5ML auto-injector Inject 0.5 mL (5 mg total) under the skin once a week for 28 days Do not start before May 22, 2025. 2 mL 0    [START ON 6/19/2025] Zepbound 7.5 MG/0.5ML auto-injector Inject 0.5 mL (7.5 mg total) under the skin once a week for 28 days Do not start before June 19, 2025. 2 mL 0     No current facility-administered medications for this visit.

## 2025-05-28 NOTE — ASSESSMENT & PLAN NOTE
Was recommended zepbound but has not taken it.   Will see a nutritionist and work on it. She has been working on it herself as well.

## 2025-05-28 NOTE — TELEPHONE ENCOUNTER
Spoke to patient informing her that her plan is OON for the 24 hr manometry. Patient stated she is aware and wanted to cancel because she cannot pay the out of pocket amount. Patient stated she will figure it out with [facility].

## 2025-06-02 ENCOUNTER — OFFICE VISIT (OUTPATIENT)
Dept: FAMILY MEDICINE CLINIC | Facility: CLINIC | Age: 25
End: 2025-06-02

## 2025-06-02 VITALS
RESPIRATION RATE: 16 BRPM | HEIGHT: 68 IN | HEART RATE: 84 BPM | OXYGEN SATURATION: 99 % | WEIGHT: 282.3 LBS | DIASTOLIC BLOOD PRESSURE: 80 MMHG | TEMPERATURE: 97.8 F | BODY MASS INDEX: 42.78 KG/M2 | SYSTOLIC BLOOD PRESSURE: 110 MMHG

## 2025-06-02 DIAGNOSIS — E66.813 CLASS 3 SEVERE OBESITY DUE TO EXCESS CALORIES WITH BODY MASS INDEX (BMI) OF 40.0 TO 44.9 IN ADULT: Primary | ICD-10-CM

## 2025-06-02 PROCEDURE — 99213 OFFICE O/P EST LOW 20 MIN: CPT | Performed by: NURSE PRACTITIONER

## 2025-06-02 RX ORDER — TOPIRAMATE 25 MG/1
25 TABLET, FILM COATED ORAL 2 TIMES DAILY
Qty: 60 TABLET | Refills: 1 | Status: SHIPPED | OUTPATIENT
Start: 2025-06-02

## 2025-06-02 NOTE — PROGRESS NOTES
Name: Fransisca Angela      : 2000      MRN: [id number]  Encounter Provider: CYRUS Montiel  Encounter Date: 2025   Encounter department: [facility]  :  Assessment & Plan  Class 3 severe obesity due to excess calories with body mass index (BMI) of 40.0 to 44.9 in adult      Orders:    topiramate (Topamax) 25 mg tablet; Take 1 tablet (25 mg total) by mouth 2 (two) times a day  - Insurance not covering Zepbound  - Treadmill at home, walking to bus stop or to work, on her feet all day at work  - Diet: tries to meal prep during week. Limited high sugary beverages. Trying to limit processed food.   - Amenable to weight mgmt referral.  Trial topiramate, discussed expected outcomes and reviewed possible side effects of medication  Follow up in 4 weeks to review tolerance/ progress          History of Present Illness   24 YO female with PMH of chronic right knee pain, chronic mid and low back pain presents today for follow up discussion about weight loss medications.    The following portions of the patient's history were reviewed and updated as appropriate: allergies, current medications, past family history, past medical history, past social history, past surgical history and problem list.        Review of Systems   Constitutional:  Negative for chills and fever.   HENT:  Negative for ear pain and sore throat.    Eyes:  Negative for pain and visual disturbance.   Respiratory:  Negative for cough and shortness of breath.    Cardiovascular:  Negative for chest pain and palpitations.   Gastrointestinal:  Negative for abdominal pain and vomiting.   Genitourinary:  Negative for dysuria and hematuria.   Musculoskeletal:  Negative for arthralgias and back pain.   Skin:  Negative for color change and rash.   Neurological:  Negative for seizures and syncope.   All other systems reviewed and are negative.      Objective   /80 (BP Location: Right arm, Patient Position:  "Sitting, Cuff Size: Large)   Pulse 84   Temp 97.8 °F (36.6 °C) (Temporal)   Resp 16   Ht 5' 8\" (1.727 m)   Wt 128 kg (282 lb 4.8 oz)   SpO2 99%   BMI 42.92 kg/m²      Physical Exam  Vitals and nursing note reviewed.   Constitutional:       General: She is not in acute distress.     Appearance: She is well-developed.   HENT:      Head: Normocephalic and atraumatic.     Eyes:      Conjunctiva/sclera: Conjunctivae normal.       Cardiovascular:      Rate and Rhythm: Normal rate and regular rhythm.      Heart sounds: No murmur heard.  Pulmonary:      Effort: Pulmonary effort is normal. No respiratory distress.      Breath sounds: Normal breath sounds.   Abdominal:      Palpations: Abdomen is soft.      Tenderness: There is no abdominal tenderness.     Musculoskeletal:         General: No swelling.      Cervical back: Neck supple.     Skin:     General: Skin is warm and dry.      Capillary Refill: Capillary refill takes less than 2 seconds.     Neurological:      Mental Status: She is alert.     Psychiatric:         Mood and Affect: Mood normal.         "

## 2025-06-02 NOTE — ASSESSMENT & PLAN NOTE
Orders:    topiramate (Topamax) 25 mg tablet; Take 1 tablet (25 mg total) by mouth 2 (two) times a day  - Insurance not covering Zepbound  - Treadmill at home, walking to bus stop or to work, on her feet all day at work  - Diet: tries to meal prep during week. Limited high sugary beverages. Trying to limit processed food.   - Amenable to weight mgmt referral.  Trial topiramate, discussed expected outcomes and reviewed possible side effects of medication  Follow up in 4 weeks to review tolerance/ progress

## 2025-07-16 DIAGNOSIS — E66.813 CLASS 3 SEVERE OBESITY DUE TO EXCESS CALORIES WITH BODY MASS INDEX (BMI) OF 40.0 TO 44.9 IN ADULT: Primary | ICD-10-CM

## 2025-07-16 RX ORDER — BUPROPION HYDROCHLORIDE 150 MG/1
150 TABLET ORAL EVERY MORNING
Qty: 30 TABLET | Refills: 1 | Status: SHIPPED | OUTPATIENT
Start: 2025-07-16 | End: 2026-01-12

## 2025-07-16 RX ORDER — TOPIRAMATE 25 MG/1
25 TABLET, FILM COATED ORAL 2 TIMES DAILY
Qty: 60 TABLET | Refills: 1 | Status: SHIPPED | OUTPATIENT
Start: 2025-07-16

## (undated) DEVICE — CHLORAPREP HI-LITE 26ML ORANGE

## (undated) DEVICE — SUT VICRYL 3-0 SH 27 IN J416H

## (undated) DEVICE — SYRINGE 30ML LL

## (undated) DEVICE — MEDI-VAC YANK SUCT HNDL W/TPRD BULBOUS TIP: Brand: CARDINAL HEALTH

## (undated) DEVICE — BULB SYRINGE,IRRIGATION WITH PROTECTIVE CAP: Brand: DOVER

## (undated) DEVICE — STANDARD SURGICAL GOWN, L: Brand: CONVERTORS

## (undated) DEVICE — SUT STRATAFIX SPIRAL MONOCRYL PLUS  3-0 PS-2 30 CM SXMP1B106

## (undated) DEVICE — SPONGE LAP 18 X 18 IN STRL RFD

## (undated) DEVICE — TRAY FOLEY 16FR URIMETER SURESTEP

## (undated) DEVICE — SINGLE PORT MANIFOLD: Brand: NEPTUNE 2

## (undated) DEVICE — SUT MONOCRYL 4-0 PS-2 27 IN Y426H

## (undated) DEVICE — SUT VICRYL 0 CT-1 27 IN J340H

## (undated) DEVICE — GAUZE SPONGES,16 PLY: Brand: CURITY

## (undated) DEVICE — DISPOSABLE OR TOWEL: Brand: CARDINAL HEALTH

## (undated) DEVICE — ADHESIVE SKIN CLOSURE SYS EXOFIN FUSION 22CM

## (undated) DEVICE — INTENDED FOR TISSUE SEPARATION, AND OTHER PROCEDURES THAT REQUIRE A SHARP SURGICAL BLADE TO PUNCTURE OR CUT.: Brand: BARD-PARKER ® SAFETYLOCK CARBON RIB-BACK BLADES

## (undated) DEVICE — ELECTRODE BLADE MOD E-Z CLEAN 2.5IN 6.4CM -0012M

## (undated) DEVICE — SUPER SPONGES,MEDIUM: Brand: KERLIX

## (undated) DEVICE — TUBING SUCTION 5MM X 12 FT

## (undated) DEVICE — SUT ETHILON 4-0 P-S 18 IN 699H

## (undated) DEVICE — STERILE POLYISOPRENE POWDER-FREE SURGICAL GLOVES WITH EMOLLIENT COATING: Brand: PROTEXIS

## (undated) DEVICE — NEEDLE SPINAL18G X 3.5 IN QUINCKE

## (undated) DEVICE — PACK UNIVERSAL DRAPES SUB-Q ICD

## (undated) DEVICE — LIGHT GLOVE GREEN

## (undated) DEVICE — GLOVE SRG LF STRL BGL SKNSNS 6.5 PF

## (undated) DEVICE — 1820 FOAM BLOCK NEEDLE COUNTER: Brand: DEVON

## (undated) DEVICE — DRAPE SHEET THREE QUARTER

## (undated) DEVICE — SCD SEQUENTIAL COMPRESSION COMFORT SLEEVE MEDIUM KNEE LENGTH: Brand: KENDALL SCD

## (undated) DEVICE — SUT VICRYL 0 CT-1 CR/8 27 IN JJ41G

## (undated) DEVICE — NEPTUNE E-SEP SMOKE EVACUATION PENCIL, COATED, 70MM BLADE, PUSH BUTTON SWITCH: Brand: NEPTUNE E-SEP

## (undated) DEVICE — BASIC SINGLE BASIN-LF: Brand: MEDLINE INDUSTRIES, INC.

## (undated) DEVICE — PROXIMATE SKIN STAPLERS (35 WIDE) CONTAINS 35 STAINLESS STEEL STAPLES (FIXED HEAD): Brand: PROXIMATE

## (undated) DEVICE — SUT MONOCRYL 3-0 PS-2 27 IN Y427H

## (undated) DEVICE — DECANTER: Brand: UNBRANDED

## (undated) DEVICE — NEEDLE BLUNT 18 G X 1 1/2IN

## (undated) DEVICE — SKIN MARKER DUAL TIP WITH RULER CAP, FLEXIBLE RULER AND LABELS: Brand: DEVON

## (undated) DEVICE — STERILE POLYISOPRENE POWDER-FREE SURGICAL GLOVES: Brand: PROTEXIS